# Patient Record
Sex: FEMALE | Race: BLACK OR AFRICAN AMERICAN | Employment: OTHER | URBAN - METROPOLITAN AREA
[De-identification: names, ages, dates, MRNs, and addresses within clinical notes are randomized per-mention and may not be internally consistent; named-entity substitution may affect disease eponyms.]

---

## 2018-10-16 ENCOUNTER — HOSPITAL ENCOUNTER (EMERGENCY)
Age: 28
Discharge: HOME | End: 2018-10-16
Attending: EMERGENCY MEDICINE
Payer: MEDICAID

## 2018-10-16 VITALS
TEMPERATURE: 97.8 F | HEART RATE: 98 BPM | HEIGHT: 70 IN | SYSTOLIC BLOOD PRESSURE: 101 MMHG | OXYGEN SATURATION: 100 % | DIASTOLIC BLOOD PRESSURE: 66 MMHG | BODY MASS INDEX: 13.6 KG/M2 | WEIGHT: 95 LBS | RESPIRATION RATE: 18 BRPM

## 2018-10-16 DIAGNOSIS — G35 MULTIPLE SCLEROSIS (HCC): Primary | ICD-10-CM

## 2018-10-16 LAB
ABSOLUTE EOS #: 0.4 K/UL (ref 0–0.4)
ABSOLUTE IMMATURE GRANULOCYTE: ABNORMAL K/UL (ref 0–0.3)
ABSOLUTE LYMPH #: 1.7 K/UL (ref 1–4.8)
ABSOLUTE MONO #: 0.5 K/UL (ref 0.2–0.8)
ANION GAP SERPL CALCULATED.3IONS-SCNC: 12 MMOL/L (ref 9–17)
BASOPHILS # BLD: 2 % (ref 0–2)
BASOPHILS ABSOLUTE: 0.1 K/UL (ref 0–0.2)
BUN BLDV-MCNC: 15 MG/DL (ref 6–20)
BUN/CREAT BLD: 22 (ref 9–20)
CALCIUM SERPL-MCNC: 9.8 MG/DL (ref 8.6–10.4)
CHLORIDE BLD-SCNC: 105 MMOL/L (ref 98–107)
CO2: 21 MMOL/L (ref 20–31)
CREAT SERPL-MCNC: 0.67 MG/DL (ref 0.5–0.9)
DIFFERENTIAL TYPE: ABNORMAL
EOSINOPHILS RELATIVE PERCENT: 6 % (ref 1–4)
GFR AFRICAN AMERICAN: >60 ML/MIN
GFR NON-AFRICAN AMERICAN: >60 ML/MIN
GFR SERPL CREATININE-BSD FRML MDRD: ABNORMAL ML/MIN/{1.73_M2}
GFR SERPL CREATININE-BSD FRML MDRD: ABNORMAL ML/MIN/{1.73_M2}
GLUCOSE BLD-MCNC: 132 MG/DL (ref 70–99)
HCT VFR BLD CALC: 42.8 % (ref 36–46)
HEMOGLOBIN: 14.3 G/DL (ref 12–16)
IMMATURE GRANULOCYTES: ABNORMAL %
LYMPHOCYTES # BLD: 26 % (ref 24–44)
MAGNESIUM: 2 MG/DL (ref 1.6–2.6)
MCH RBC QN AUTO: 29.9 PG (ref 26–34)
MCHC RBC AUTO-ENTMCNC: 33.5 G/DL (ref 31–37)
MCV RBC AUTO: 89 FL (ref 80–100)
MONOCYTES # BLD: 8 % (ref 1–7)
NRBC AUTOMATED: ABNORMAL PER 100 WBC
PDW BLD-RTO: 14.7 % (ref 11.5–14.5)
PLATELET # BLD: 233 K/UL (ref 130–400)
PLATELET ESTIMATE: ABNORMAL
PMV BLD AUTO: 9.1 FL (ref 6–12)
POTASSIUM SERPL-SCNC: 4.4 MMOL/L (ref 3.7–5.3)
RBC # BLD: 4.8 M/UL (ref 4–5.2)
RBC # BLD: ABNORMAL 10*6/UL
SEG NEUTROPHILS: 58 % (ref 36–66)
SEGMENTED NEUTROPHILS ABSOLUTE COUNT: 3.9 K/UL (ref 1.8–7.7)
SODIUM BLD-SCNC: 138 MMOL/L (ref 135–144)
WBC # BLD: 6.7 K/UL (ref 3.5–11)
WBC # BLD: ABNORMAL 10*3/UL

## 2018-10-16 PROCEDURE — 6360000002 HC RX W HCPCS: Performed by: EMERGENCY MEDICINE

## 2018-10-16 PROCEDURE — 36415 COLL VENOUS BLD VENIPUNCTURE: CPT

## 2018-10-16 PROCEDURE — 99283 EMERGENCY DEPT VISIT LOW MDM: CPT

## 2018-10-16 PROCEDURE — 2580000003 HC RX 258: Performed by: EMERGENCY MEDICINE

## 2018-10-16 PROCEDURE — 85025 COMPLETE CBC W/AUTO DIFF WBC: CPT

## 2018-10-16 PROCEDURE — 96375 TX/PRO/DX INJ NEW DRUG ADDON: CPT

## 2018-10-16 PROCEDURE — 83735 ASSAY OF MAGNESIUM: CPT

## 2018-10-16 PROCEDURE — 80048 BASIC METABOLIC PNL TOTAL CA: CPT

## 2018-10-16 PROCEDURE — 96365 THER/PROPH/DIAG IV INF INIT: CPT

## 2018-10-16 RX ORDER — PREDNISONE 20 MG/1
20 TABLET ORAL 2 TIMES DAILY
Qty: 10 TABLET | Refills: 0 | Status: SHIPPED | OUTPATIENT
Start: 2018-10-16 | End: 2018-10-21

## 2018-10-16 RX ORDER — 0.9 % SODIUM CHLORIDE 0.9 %
500 INTRAVENOUS SOLUTION INTRAVENOUS ONCE
Status: COMPLETED | OUTPATIENT
Start: 2018-10-16 | End: 2018-10-16

## 2018-10-16 RX ORDER — OXYCODONE HYDROCHLORIDE AND ACETAMINOPHEN 5; 325 MG/1; MG/1
1-2 TABLET ORAL EVERY 6 HOURS PRN
Qty: 20 TABLET | Refills: 0 | Status: SHIPPED | OUTPATIENT
Start: 2018-10-16 | End: 2018-10-19

## 2018-10-16 RX ORDER — ONDANSETRON 2 MG/ML
4 INJECTION INTRAMUSCULAR; INTRAVENOUS ONCE
Status: COMPLETED | OUTPATIENT
Start: 2018-10-16 | End: 2018-10-16

## 2018-10-16 RX ORDER — FENTANYL CITRATE 50 UG/ML
50 INJECTION, SOLUTION INTRAMUSCULAR; INTRAVENOUS ONCE
Status: COMPLETED | OUTPATIENT
Start: 2018-10-16 | End: 2018-10-16

## 2018-10-16 RX ADMIN — SODIUM CHLORIDE 1000 MG: 9 INJECTION, SOLUTION INTRAVENOUS at 21:20

## 2018-10-16 RX ADMIN — SODIUM CHLORIDE 500 ML: 9 INJECTION, SOLUTION INTRAVENOUS at 20:58

## 2018-10-16 RX ADMIN — FENTANYL CITRATE 50 MCG: 50 INJECTION, SOLUTION INTRAMUSCULAR; INTRAVENOUS at 20:59

## 2018-10-16 RX ADMIN — ONDANSETRON HYDROCHLORIDE 4 MG: 2 INJECTION, SOLUTION INTRAMUSCULAR; INTRAVENOUS at 20:59

## 2018-10-16 ASSESSMENT — PAIN SCALES - GENERAL
PAINLEVEL_OUTOF10: 8
PAINLEVEL_OUTOF10: 8

## 2018-10-16 ASSESSMENT — ENCOUNTER SYMPTOMS
RESPIRATORY NEGATIVE: 1
GASTROINTESTINAL NEGATIVE: 1

## 2018-10-16 ASSESSMENT — PAIN DESCRIPTION - PAIN TYPE: TYPE: CHRONIC PAIN

## 2018-10-16 ASSESSMENT — PAIN DESCRIPTION - LOCATION: LOCATION: GENERALIZED

## 2019-11-04 ENCOUNTER — HOSPITAL ENCOUNTER (INPATIENT)
Age: 29
LOS: 3 days | Discharge: NURSING, REHAB OR LONG-TERM CARE | DRG: 043 | End: 2019-11-08
Attending: EMERGENCY MEDICINE | Admitting: INTERNAL MEDICINE
Payer: MEDICAID

## 2019-11-04 ENCOUNTER — APPOINTMENT (OUTPATIENT)
Dept: CT IMAGING | Age: 29
DRG: 043 | End: 2019-11-04
Payer: MEDICAID

## 2019-11-04 DIAGNOSIS — G35 EXACERBATION OF MULTIPLE SCLEROSIS (HCC): Primary | ICD-10-CM

## 2019-11-04 PROCEDURE — 96365 THER/PROPH/DIAG IV INF INIT: CPT

## 2019-11-04 PROCEDURE — 70450 CT HEAD/BRAIN W/O DYE: CPT

## 2019-11-04 PROCEDURE — 80048 BASIC METABOLIC PNL TOTAL CA: CPT

## 2019-11-04 PROCEDURE — 6360000002 HC RX W HCPCS: Performed by: PHYSICIAN ASSISTANT

## 2019-11-04 PROCEDURE — 2580000003 HC RX 258: Performed by: PHYSICIAN ASSISTANT

## 2019-11-04 PROCEDURE — 85025 COMPLETE CBC W/AUTO DIFF WBC: CPT

## 2019-11-04 PROCEDURE — 99285 EMERGENCY DEPT VISIT HI MDM: CPT

## 2019-11-04 PROCEDURE — 83874 ASSAY OF MYOGLOBIN: CPT

## 2019-11-04 PROCEDURE — 84484 ASSAY OF TROPONIN QUANT: CPT

## 2019-11-04 PROCEDURE — 36415 COLL VENOUS BLD VENIPUNCTURE: CPT

## 2019-11-04 RX ORDER — 0.9 % SODIUM CHLORIDE 0.9 %
1000 INTRAVENOUS SOLUTION INTRAVENOUS ONCE
Status: COMPLETED | OUTPATIENT
Start: 2019-11-04 | End: 2019-11-05

## 2019-11-04 RX ADMIN — SODIUM CHLORIDE 1000 MG: 9 INJECTION, SOLUTION INTRAVENOUS at 23:56

## 2019-11-04 ASSESSMENT — PAIN DESCRIPTION - PROGRESSION: CLINICAL_PROGRESSION: NOT CHANGED

## 2019-11-04 ASSESSMENT — PAIN DESCRIPTION - FREQUENCY: FREQUENCY: CONTINUOUS

## 2019-11-04 ASSESSMENT — PAIN - FUNCTIONAL ASSESSMENT: PAIN_FUNCTIONAL_ASSESSMENT: PREVENTS OR INTERFERES SOME ACTIVE ACTIVITIES AND ADLS

## 2019-11-04 ASSESSMENT — PAIN DESCRIPTION - ORIENTATION: ORIENTATION: RIGHT

## 2019-11-04 ASSESSMENT — PAIN DESCRIPTION - ONSET: ONSET: ON-GOING

## 2019-11-04 ASSESSMENT — PAIN SCALES - GENERAL: PAINLEVEL_OUTOF10: 6

## 2019-11-04 ASSESSMENT — PAIN DESCRIPTION - DESCRIPTORS: DESCRIPTORS: ACHING;PRESSURE

## 2019-11-05 ENCOUNTER — APPOINTMENT (OUTPATIENT)
Dept: MRI IMAGING | Age: 29
DRG: 043 | End: 2019-11-05
Payer: MEDICAID

## 2019-11-05 PROBLEM — F12.90 MARIJUANA SMOKER, CONTINUOUS: Status: ACTIVE | Noted: 2019-11-05

## 2019-11-05 PROBLEM — G35 EXACERBATION OF MULTIPLE SCLEROSIS (HCC): Status: ACTIVE | Noted: 2019-11-05

## 2019-11-05 PROBLEM — R21 RASH IN ADULT: Status: ACTIVE | Noted: 2019-11-05

## 2019-11-05 LAB
ABSOLUTE EOS #: 0.39 K/UL (ref 0–0.44)
ABSOLUTE IMMATURE GRANULOCYTE: 0.03 K/UL (ref 0–0.3)
ABSOLUTE LYMPH #: 1.95 K/UL (ref 1.1–3.7)
ABSOLUTE MONO #: 0.82 K/UL (ref 0.1–1.2)
ALBUMIN SERPL-MCNC: 3.8 G/DL (ref 3.5–5.2)
ANION GAP SERPL CALCULATED.3IONS-SCNC: 9 MMOL/L (ref 9–17)
BASOPHILS # BLD: 1 % (ref 0–2)
BASOPHILS ABSOLUTE: 0.05 K/UL (ref 0–0.2)
BUN BLDV-MCNC: 14 MG/DL (ref 6–20)
BUN/CREAT BLD: 24 (ref 9–20)
CALCIUM SERPL-MCNC: 9 MG/DL (ref 8.6–10.4)
CHLORIDE BLD-SCNC: 105 MMOL/L (ref 98–107)
CO2: 26 MMOL/L (ref 20–31)
CREAT SERPL-MCNC: 0.59 MG/DL (ref 0.5–0.9)
DIFFERENTIAL TYPE: ABNORMAL
EKG ATRIAL RATE: 65 BPM
EKG P AXIS: 73 DEGREES
EKG P-R INTERVAL: 106 MS
EKG Q-T INTERVAL: 402 MS
EKG QRS DURATION: 86 MS
EKG QTC CALCULATION (BAZETT): 418 MS
EKG R AXIS: 78 DEGREES
EKG T AXIS: 64 DEGREES
EKG VENTRICULAR RATE: 65 BPM
EOSINOPHILS RELATIVE PERCENT: 6 % (ref 1–4)
GFR AFRICAN AMERICAN: >60 ML/MIN
GFR NON-AFRICAN AMERICAN: >60 ML/MIN
GFR SERPL CREATININE-BSD FRML MDRD: ABNORMAL ML/MIN/{1.73_M2}
GFR SERPL CREATININE-BSD FRML MDRD: ABNORMAL ML/MIN/{1.73_M2}
GLUCOSE BLD-MCNC: 95 MG/DL (ref 70–99)
HCT VFR BLD CALC: 39 % (ref 36.3–47.1)
HEMOGLOBIN: 12.4 G/DL (ref 11.9–15.1)
IMMATURE GRANULOCYTES: 0 %
LYMPHOCYTES # BLD: 28 % (ref 24–43)
MCH RBC QN AUTO: 29.2 PG (ref 25.2–33.5)
MCHC RBC AUTO-ENTMCNC: 31.8 G/DL (ref 28.4–34.8)
MCV RBC AUTO: 92 FL (ref 82.6–102.9)
MONOCYTES # BLD: 12 % (ref 3–12)
MYOGLOBIN: <21 NG/ML (ref 25–58)
NRBC AUTOMATED: 0 PER 100 WBC
PDW BLD-RTO: 13.8 % (ref 11.8–14.4)
PLATELET # BLD: 228 K/UL (ref 138–453)
PLATELET ESTIMATE: ABNORMAL
PMV BLD AUTO: 10.2 FL (ref 8.1–13.5)
POTASSIUM SERPL-SCNC: 3.8 MMOL/L (ref 3.7–5.3)
RBC # BLD: 4.24 M/UL (ref 3.95–5.11)
RBC # BLD: ABNORMAL 10*6/UL
SEG NEUTROPHILS: 53 % (ref 36–65)
SEGMENTED NEUTROPHILS ABSOLUTE COUNT: 3.66 K/UL (ref 1.5–8.1)
SODIUM BLD-SCNC: 140 MMOL/L (ref 135–144)
THYROXINE, FREE: 0.76 NG/DL (ref 0.93–1.7)
TROPONIN INTERP: ABNORMAL
TROPONIN T: ABNORMAL NG/ML
TROPONIN, HIGH SENSITIVITY: <6 NG/L (ref 0–14)
TSH SERPL DL<=0.05 MIU/L-ACNC: 0.2 MIU/L (ref 0.3–5)
VITAMIN D 25-HYDROXY: 7.9 NG/ML (ref 30–100)
WBC # BLD: 6.9 K/UL (ref 3.5–11.3)
WBC # BLD: ABNORMAL 10*3/UL

## 2019-11-05 PROCEDURE — 84439 ASSAY OF FREE THYROXINE: CPT

## 2019-11-05 PROCEDURE — A9579 GAD-BASE MR CONTRAST NOS,1ML: HCPCS | Performed by: PSYCHIATRY & NEUROLOGY

## 2019-11-05 PROCEDURE — 70553 MRI BRAIN STEM W/O & W/DYE: CPT

## 2019-11-05 PROCEDURE — 82040 ASSAY OF SERUM ALBUMIN: CPT

## 2019-11-05 PROCEDURE — 84443 ASSAY THYROID STIM HORMONE: CPT

## 2019-11-05 PROCEDURE — 99255 IP/OBS CONSLTJ NEW/EST HI 80: CPT | Performed by: PSYCHIATRY & NEUROLOGY

## 2019-11-05 PROCEDURE — APPSS45 APP SPLIT SHARED TIME 31-45 MINUTES: Performed by: NURSE PRACTITIONER

## 2019-11-05 PROCEDURE — 84484 ASSAY OF TROPONIN QUANT: CPT

## 2019-11-05 PROCEDURE — 83874 ASSAY OF MYOGLOBIN: CPT

## 2019-11-05 PROCEDURE — 6360000002 HC RX W HCPCS: Performed by: PSYCHIATRY & NEUROLOGY

## 2019-11-05 PROCEDURE — 82746 ASSAY OF FOLIC ACID SERUM: CPT

## 2019-11-05 PROCEDURE — 36415 COLL VENOUS BLD VENIPUNCTURE: CPT

## 2019-11-05 PROCEDURE — 82607 VITAMIN B-12: CPT

## 2019-11-05 PROCEDURE — 2580000003 HC RX 258: Performed by: NURSE PRACTITIONER

## 2019-11-05 PROCEDURE — 6360000004 HC RX CONTRAST MEDICATION: Performed by: PSYCHIATRY & NEUROLOGY

## 2019-11-05 PROCEDURE — 82306 VITAMIN D 25 HYDROXY: CPT

## 2019-11-05 PROCEDURE — 99223 1ST HOSP IP/OBS HIGH 75: CPT | Performed by: INTERNAL MEDICINE

## 2019-11-05 PROCEDURE — 1200000000 HC SEMI PRIVATE

## 2019-11-05 PROCEDURE — 93005 ELECTROCARDIOGRAM TRACING: CPT | Performed by: PHYSICIAN ASSISTANT

## 2019-11-05 PROCEDURE — 2580000003 HC RX 258: Performed by: PSYCHIATRY & NEUROLOGY

## 2019-11-05 PROCEDURE — 2580000003 HC RX 258: Performed by: PHYSICIAN ASSISTANT

## 2019-11-05 PROCEDURE — 6360000002 HC RX W HCPCS: Performed by: NURSE PRACTITIONER

## 2019-11-05 RX ORDER — SODIUM CHLORIDE 9 MG/ML
INJECTION, SOLUTION INTRAVENOUS CONTINUOUS
Status: DISCONTINUED | OUTPATIENT
Start: 2019-11-05 | End: 2019-11-08 | Stop reason: HOSPADM

## 2019-11-05 RX ORDER — NICOTINE 21 MG/24HR
1 PATCH, TRANSDERMAL 24 HOURS TRANSDERMAL DAILY PRN
Status: DISCONTINUED | OUTPATIENT
Start: 2019-11-05 | End: 2019-11-08 | Stop reason: HOSPADM

## 2019-11-05 RX ORDER — SODIUM CHLORIDE 0.9 % (FLUSH) 0.9 %
10 SYRINGE (ML) INJECTION
Status: COMPLETED | OUTPATIENT
Start: 2019-11-05 | End: 2019-11-05

## 2019-11-05 RX ORDER — SODIUM CHLORIDE 0.9 % (FLUSH) 0.9 %
10 SYRINGE (ML) INJECTION EVERY 12 HOURS SCHEDULED
Status: DISCONTINUED | OUTPATIENT
Start: 2019-11-05 | End: 2019-11-08 | Stop reason: HOSPADM

## 2019-11-05 RX ORDER — ONDANSETRON 2 MG/ML
4 INJECTION INTRAMUSCULAR; INTRAVENOUS EVERY 6 HOURS PRN
Status: DISCONTINUED | OUTPATIENT
Start: 2019-11-05 | End: 2019-11-05 | Stop reason: SDUPTHER

## 2019-11-05 RX ORDER — ONDANSETRON 2 MG/ML
4 INJECTION INTRAMUSCULAR; INTRAVENOUS EVERY 6 HOURS PRN
Status: DISCONTINUED | OUTPATIENT
Start: 2019-11-05 | End: 2019-11-08 | Stop reason: HOSPADM

## 2019-11-05 RX ORDER — POTASSIUM CHLORIDE 7.45 MG/ML
10 INJECTION INTRAVENOUS PRN
Status: DISCONTINUED | OUTPATIENT
Start: 2019-11-05 | End: 2019-11-08 | Stop reason: HOSPADM

## 2019-11-05 RX ORDER — ONDANSETRON 4 MG/1
4 TABLET, ORALLY DISINTEGRATING ORAL EVERY 6 HOURS PRN
Status: DISCONTINUED | OUTPATIENT
Start: 2019-11-05 | End: 2019-11-08 | Stop reason: HOSPADM

## 2019-11-05 RX ORDER — POTASSIUM CHLORIDE 20 MEQ/1
40 TABLET, EXTENDED RELEASE ORAL PRN
Status: DISCONTINUED | OUTPATIENT
Start: 2019-11-05 | End: 2019-11-08 | Stop reason: HOSPADM

## 2019-11-05 RX ORDER — ACETAMINOPHEN 325 MG/1
650 TABLET ORAL EVERY 4 HOURS PRN
Status: DISCONTINUED | OUTPATIENT
Start: 2019-11-05 | End: 2019-11-08 | Stop reason: HOSPADM

## 2019-11-05 RX ORDER — MAGNESIUM SULFATE 1 G/100ML
1 INJECTION INTRAVENOUS PRN
Status: DISCONTINUED | OUTPATIENT
Start: 2019-11-05 | End: 2019-11-08 | Stop reason: HOSPADM

## 2019-11-05 RX ORDER — SODIUM CHLORIDE 0.9 % (FLUSH) 0.9 %
10 SYRINGE (ML) INJECTION PRN
Status: DISCONTINUED | OUTPATIENT
Start: 2019-11-05 | End: 2019-11-08 | Stop reason: HOSPADM

## 2019-11-05 RX ADMIN — Medication 10 ML: at 16:03

## 2019-11-05 RX ADMIN — SODIUM CHLORIDE 500 MG: 9 INJECTION, SOLUTION INTRAVENOUS at 15:18

## 2019-11-05 RX ADMIN — SODIUM CHLORIDE: 9 INJECTION, SOLUTION INTRAVENOUS at 02:44

## 2019-11-05 RX ADMIN — ENOXAPARIN SODIUM 40 MG: 100 INJECTION SUBCUTANEOUS at 09:01

## 2019-11-05 RX ADMIN — SODIUM CHLORIDE 1000 ML: 9 INJECTION, SOLUTION INTRAVENOUS at 00:03

## 2019-11-05 RX ADMIN — GADOTERIDOL 10 ML: 279.3 INJECTION, SOLUTION INTRAVENOUS at 16:02

## 2019-11-05 ASSESSMENT — PAIN SCALES - GENERAL: PAINLEVEL_OUTOF10: 6

## 2019-11-06 ENCOUNTER — APPOINTMENT (OUTPATIENT)
Dept: MRI IMAGING | Age: 29
DRG: 043 | End: 2019-11-06
Payer: MEDICAID

## 2019-11-06 LAB
ABSOLUTE EOS #: <0.03 K/UL (ref 0–0.44)
ABSOLUTE IMMATURE GRANULOCYTE: 0.19 K/UL (ref 0–0.3)
ABSOLUTE LYMPH #: 0.84 K/UL (ref 1.1–3.7)
ABSOLUTE MONO #: 0.57 K/UL (ref 0.1–1.2)
ANION GAP SERPL CALCULATED.3IONS-SCNC: 13 MMOL/L (ref 9–17)
BASOPHILS # BLD: 0 % (ref 0–2)
BASOPHILS ABSOLUTE: 0.04 K/UL (ref 0–0.2)
BUN BLDV-MCNC: 10 MG/DL (ref 6–20)
BUN/CREAT BLD: 23 (ref 9–20)
CALCIUM SERPL-MCNC: 9.2 MG/DL (ref 8.6–10.4)
CHLORIDE BLD-SCNC: 104 MMOL/L (ref 98–107)
CO2: 20 MMOL/L (ref 20–31)
CREAT SERPL-MCNC: 0.43 MG/DL (ref 0.5–0.9)
DIFFERENTIAL TYPE: ABNORMAL
EOSINOPHILS RELATIVE PERCENT: 0 % (ref 1–4)
FOLATE: 9.6 NG/ML
GFR AFRICAN AMERICAN: >60 ML/MIN
GFR NON-AFRICAN AMERICAN: >60 ML/MIN
GFR SERPL CREATININE-BSD FRML MDRD: ABNORMAL ML/MIN/{1.73_M2}
GFR SERPL CREATININE-BSD FRML MDRD: ABNORMAL ML/MIN/{1.73_M2}
GLUCOSE BLD-MCNC: 140 MG/DL (ref 70–99)
HCT VFR BLD CALC: 34.9 % (ref 36.3–47.1)
HEMOGLOBIN: 11.4 G/DL (ref 11.9–15.1)
IMMATURE GRANULOCYTES: 1 %
INR BLD: 1.1
LYMPHOCYTES # BLD: 5 % (ref 24–43)
MCH RBC QN AUTO: 29.3 PG (ref 25.2–33.5)
MCHC RBC AUTO-ENTMCNC: 32.7 G/DL (ref 28.4–34.8)
MCV RBC AUTO: 89.7 FL (ref 82.6–102.9)
MONOCYTES # BLD: 3 % (ref 3–12)
NRBC AUTOMATED: 0 PER 100 WBC
PDW BLD-RTO: 13.8 % (ref 11.8–14.4)
PLATELET # BLD: 219 K/UL (ref 138–453)
PLATELET ESTIMATE: ABNORMAL
PMV BLD AUTO: 10.3 FL (ref 8.1–13.5)
POTASSIUM SERPL-SCNC: 3.6 MMOL/L (ref 3.7–5.3)
PROTHROMBIN TIME: 11.3 SEC (ref 9.7–11.6)
RBC # BLD: 3.89 M/UL (ref 3.95–5.11)
RBC # BLD: ABNORMAL 10*6/UL
SEG NEUTROPHILS: 91 % (ref 36–65)
SEGMENTED NEUTROPHILS ABSOLUTE COUNT: 17.16 K/UL (ref 1.5–8.1)
SODIUM BLD-SCNC: 137 MMOL/L (ref 135–144)
VITAMIN B-12: 461 PG/ML (ref 232–1245)
WBC # BLD: 18.8 K/UL (ref 3.5–11.3)
WBC # BLD: ABNORMAL 10*3/UL

## 2019-11-06 PROCEDURE — 1200000000 HC SEMI PRIVATE

## 2019-11-06 PROCEDURE — 85025 COMPLETE CBC W/AUTO DIFF WBC: CPT

## 2019-11-06 PROCEDURE — 6360000002 HC RX W HCPCS: Performed by: PSYCHIATRY & NEUROLOGY

## 2019-11-06 PROCEDURE — 97110 THERAPEUTIC EXERCISES: CPT

## 2019-11-06 PROCEDURE — 97535 SELF CARE MNGMENT TRAINING: CPT

## 2019-11-06 PROCEDURE — 97112 NEUROMUSCULAR REEDUCATION: CPT

## 2019-11-06 PROCEDURE — 2580000003 HC RX 258: Performed by: PSYCHIATRY & NEUROLOGY

## 2019-11-06 PROCEDURE — 6370000000 HC RX 637 (ALT 250 FOR IP): Performed by: PSYCHIATRY & NEUROLOGY

## 2019-11-06 PROCEDURE — A9579 GAD-BASE MR CONTRAST NOS,1ML: HCPCS | Performed by: PSYCHIATRY & NEUROLOGY

## 2019-11-06 PROCEDURE — 80048 BASIC METABOLIC PNL TOTAL CA: CPT

## 2019-11-06 PROCEDURE — 99232 SBSQ HOSP IP/OBS MODERATE 35: CPT | Performed by: PSYCHIATRY & NEUROLOGY

## 2019-11-06 PROCEDURE — 99232 SBSQ HOSP IP/OBS MODERATE 35: CPT | Performed by: INTERNAL MEDICINE

## 2019-11-06 PROCEDURE — 97167 OT EVAL HIGH COMPLEX 60 MIN: CPT

## 2019-11-06 PROCEDURE — 6360000004 HC RX CONTRAST MEDICATION: Performed by: PSYCHIATRY & NEUROLOGY

## 2019-11-06 PROCEDURE — 2580000003 HC RX 258: Performed by: NURSE PRACTITIONER

## 2019-11-06 PROCEDURE — 6360000002 HC RX W HCPCS: Performed by: NURSE PRACTITIONER

## 2019-11-06 PROCEDURE — 97163 PT EVAL HIGH COMPLEX 45 MIN: CPT

## 2019-11-06 PROCEDURE — 97530 THERAPEUTIC ACTIVITIES: CPT

## 2019-11-06 PROCEDURE — 85610 PROTHROMBIN TIME: CPT

## 2019-11-06 PROCEDURE — 36415 COLL VENOUS BLD VENIPUNCTURE: CPT

## 2019-11-06 PROCEDURE — 72156 MRI NECK SPINE W/O & W/DYE: CPT

## 2019-11-06 RX ORDER — SODIUM CHLORIDE 0.9 % (FLUSH) 0.9 %
10 SYRINGE (ML) INJECTION
Status: COMPLETED | OUTPATIENT
Start: 2019-11-06 | End: 2019-11-06

## 2019-11-06 RX ADMIN — CHOLECALCIFEROL CAP 125 MCG (5000 UNIT) 50000 UNITS: 125 CAP at 08:57

## 2019-11-06 RX ADMIN — ENOXAPARIN SODIUM 40 MG: 100 INJECTION SUBCUTANEOUS at 08:58

## 2019-11-06 RX ADMIN — SODIUM CHLORIDE 500 MG: 9 INJECTION, SOLUTION INTRAVENOUS at 15:26

## 2019-11-06 RX ADMIN — Medication 10 ML: at 02:55

## 2019-11-06 RX ADMIN — SODIUM CHLORIDE 500 MG: 9 INJECTION, SOLUTION INTRAVENOUS at 02:54

## 2019-11-06 RX ADMIN — SODIUM CHLORIDE: 9 INJECTION, SOLUTION INTRAVENOUS at 09:22

## 2019-11-06 RX ADMIN — GADOTERIDOL 10 ML: 279.3 INJECTION, SOLUTION INTRAVENOUS at 13:40

## 2019-11-06 RX ADMIN — Medication 10 ML: at 13:40

## 2019-11-06 ASSESSMENT — PAIN DESCRIPTION - LOCATION: LOCATION: ARM;HIP;BACK

## 2019-11-06 ASSESSMENT — PAIN SCALES - GENERAL: PAINLEVEL_OUTOF10: 6

## 2019-11-06 ASSESSMENT — PAIN DESCRIPTION - ORIENTATION: ORIENTATION: RIGHT

## 2019-11-07 PROBLEM — E03.8 ACQUIRED CENTRAL HYPOTHYROIDISM: Status: ACTIVE | Noted: 2019-11-07

## 2019-11-07 LAB
ABSOLUTE EOS #: 0 K/UL (ref 0–0.4)
ABSOLUTE IMMATURE GRANULOCYTE: 0.15 K/UL (ref 0–0.3)
ABSOLUTE LYMPH #: 0.45 K/UL (ref 1–4.8)
ABSOLUTE MONO #: 0.6 K/UL (ref 0.2–0.8)
ANION GAP SERPL CALCULATED.3IONS-SCNC: 10 MMOL/L (ref 9–17)
BASOPHILS # BLD: 0 %
BASOPHILS ABSOLUTE: 0 K/UL (ref 0–0.2)
BUN BLDV-MCNC: 12 MG/DL (ref 6–20)
BUN/CREAT BLD: 25 (ref 9–20)
CALCIUM SERPL-MCNC: 9 MG/DL (ref 8.6–10.4)
CHLORIDE BLD-SCNC: 108 MMOL/L (ref 98–107)
CO2: 22 MMOL/L (ref 20–31)
CREAT SERPL-MCNC: 0.48 MG/DL (ref 0.5–0.9)
DIFFERENTIAL TYPE: ABNORMAL
EOSINOPHILS RELATIVE PERCENT: 0 % (ref 1–4)
GFR AFRICAN AMERICAN: >60 ML/MIN
GFR NON-AFRICAN AMERICAN: >60 ML/MIN
GFR SERPL CREATININE-BSD FRML MDRD: ABNORMAL ML/MIN/{1.73_M2}
GFR SERPL CREATININE-BSD FRML MDRD: ABNORMAL ML/MIN/{1.73_M2}
GLUCOSE BLD-MCNC: 139 MG/DL (ref 70–99)
HCT VFR BLD CALC: 32.7 % (ref 36.3–47.1)
HEMOGLOBIN: 10.6 G/DL (ref 11.9–15.1)
IMMATURE GRANULOCYTES: 1 %
LYMPHOCYTES # BLD: 3 % (ref 24–44)
MCH RBC QN AUTO: 29 PG (ref 25.2–33.5)
MCHC RBC AUTO-ENTMCNC: 32.4 G/DL (ref 28.4–34.8)
MCV RBC AUTO: 89.3 FL (ref 82.6–102.9)
MONOCYTES # BLD: 4 % (ref 1–7)
NRBC AUTOMATED: 0 PER 100 WBC
PDW BLD-RTO: 14.1 % (ref 11.8–14.4)
PLATELET # BLD: 218 K/UL (ref 138–453)
PLATELET ESTIMATE: ABNORMAL
PMV BLD AUTO: 10.4 FL (ref 8.1–13.5)
POTASSIUM SERPL-SCNC: 4.1 MMOL/L (ref 3.7–5.3)
RBC # BLD: 3.66 M/UL (ref 3.95–5.11)
RBC # BLD: ABNORMAL 10*6/UL
SEG NEUTROPHILS: 92 % (ref 36–66)
SEGMENTED NEUTROPHILS ABSOLUTE COUNT: 13.8 K/UL (ref 1.8–7.7)
SODIUM BLD-SCNC: 140 MMOL/L (ref 135–144)
T3 TOTAL: 37 NG/DL (ref 80–200)
T4 TOTAL: 3.2 UG/DL (ref 4.5–12)
WBC # BLD: 15 K/UL (ref 3.5–11.3)
WBC # BLD: ABNORMAL 10*3/UL

## 2019-11-07 PROCEDURE — 6370000000 HC RX 637 (ALT 250 FOR IP): Performed by: INTERNAL MEDICINE

## 2019-11-07 PROCEDURE — 6370000000 HC RX 637 (ALT 250 FOR IP): Performed by: NURSE PRACTITIONER

## 2019-11-07 PROCEDURE — 99232 SBSQ HOSP IP/OBS MODERATE 35: CPT | Performed by: INTERNAL MEDICINE

## 2019-11-07 PROCEDURE — 2580000003 HC RX 258: Performed by: NURSE PRACTITIONER

## 2019-11-07 PROCEDURE — 6360000002 HC RX W HCPCS: Performed by: PSYCHIATRY & NEUROLOGY

## 2019-11-07 PROCEDURE — 85025 COMPLETE CBC W/AUTO DIFF WBC: CPT

## 2019-11-07 PROCEDURE — 6360000002 HC RX W HCPCS: Performed by: NURSE PRACTITIONER

## 2019-11-07 PROCEDURE — 84480 ASSAY TRIIODOTHYRONINE (T3): CPT

## 2019-11-07 PROCEDURE — 2580000003 HC RX 258: Performed by: PSYCHIATRY & NEUROLOGY

## 2019-11-07 PROCEDURE — 97110 THERAPEUTIC EXERCISES: CPT

## 2019-11-07 PROCEDURE — 36415 COLL VENOUS BLD VENIPUNCTURE: CPT

## 2019-11-07 PROCEDURE — 97535 SELF CARE MNGMENT TRAINING: CPT | Performed by: NURSE PRACTITIONER

## 2019-11-07 PROCEDURE — 84436 ASSAY OF TOTAL THYROXINE: CPT

## 2019-11-07 PROCEDURE — 99233 SBSQ HOSP IP/OBS HIGH 50: CPT | Performed by: PSYCHIATRY & NEUROLOGY

## 2019-11-07 PROCEDURE — 80048 BASIC METABOLIC PNL TOTAL CA: CPT

## 2019-11-07 PROCEDURE — 97530 THERAPEUTIC ACTIVITIES: CPT

## 2019-11-07 PROCEDURE — 1200000000 HC SEMI PRIVATE

## 2019-11-07 RX ORDER — HYDROCODONE BITARTRATE AND ACETAMINOPHEN 5; 325 MG/1; MG/1
1 TABLET ORAL ONCE
Status: COMPLETED | OUTPATIENT
Start: 2019-11-07 | End: 2019-11-07

## 2019-11-07 RX ORDER — LEVOTHYROXINE SODIUM 0.05 MG/1
50 TABLET ORAL DAILY
Status: DISCONTINUED | OUTPATIENT
Start: 2019-11-07 | End: 2019-11-08 | Stop reason: HOSPADM

## 2019-11-07 RX ADMIN — ENOXAPARIN SODIUM 40 MG: 100 INJECTION SUBCUTANEOUS at 08:56

## 2019-11-07 RX ADMIN — SODIUM CHLORIDE 500 MG: 9 INJECTION, SOLUTION INTRAVENOUS at 14:37

## 2019-11-07 RX ADMIN — SODIUM CHLORIDE: 9 INJECTION, SOLUTION INTRAVENOUS at 01:04

## 2019-11-07 RX ADMIN — LEVOTHYROXINE SODIUM 50 MCG: 50 TABLET ORAL at 13:58

## 2019-11-07 RX ADMIN — HYDROCODONE BITARTRATE AND ACETAMINOPHEN 1 TABLET: 5; 325 TABLET ORAL at 01:09

## 2019-11-07 RX ADMIN — SODIUM CHLORIDE: 9 INJECTION, SOLUTION INTRAVENOUS at 14:37

## 2019-11-07 RX ADMIN — SODIUM CHLORIDE 500 MG: 9 INJECTION, SOLUTION INTRAVENOUS at 03:02

## 2019-11-07 ASSESSMENT — PAIN SCALES - GENERAL: PAINLEVEL_OUTOF10: 6

## 2019-11-08 VITALS
BODY MASS INDEX: 16 KG/M2 | TEMPERATURE: 98.4 F | OXYGEN SATURATION: 99 % | RESPIRATION RATE: 16 BRPM | HEART RATE: 55 BPM | DIASTOLIC BLOOD PRESSURE: 60 MMHG | SYSTOLIC BLOOD PRESSURE: 109 MMHG | HEIGHT: 69 IN | WEIGHT: 108 LBS

## 2019-11-08 LAB
ABSOLUTE EOS #: 0 K/UL (ref 0–0.4)
ABSOLUTE IMMATURE GRANULOCYTE: 0.11 K/UL (ref 0–0.3)
ABSOLUTE LYMPH #: 0.63 K/UL (ref 1–4.8)
ABSOLUTE MONO #: 0.95 K/UL (ref 0.2–0.8)
ANION GAP SERPL CALCULATED.3IONS-SCNC: 8 MMOL/L (ref 9–17)
BASOPHILS # BLD: 0 %
BASOPHILS ABSOLUTE: 0 K/UL (ref 0–0.2)
BUN BLDV-MCNC: 9 MG/DL (ref 6–20)
BUN/CREAT BLD: 18 (ref 9–20)
CALCIUM SERPL-MCNC: 8.7 MG/DL (ref 8.6–10.4)
CHLORIDE BLD-SCNC: 107 MMOL/L (ref 98–107)
CO2: 24 MMOL/L (ref 20–31)
CREAT SERPL-MCNC: 0.49 MG/DL (ref 0.5–0.9)
DIFFERENTIAL TYPE: ABNORMAL
EOSINOPHILS RELATIVE PERCENT: 0 % (ref 1–4)
GFR AFRICAN AMERICAN: >60 ML/MIN
GFR NON-AFRICAN AMERICAN: >60 ML/MIN
GFR SERPL CREATININE-BSD FRML MDRD: ABNORMAL ML/MIN/{1.73_M2}
GFR SERPL CREATININE-BSD FRML MDRD: ABNORMAL ML/MIN/{1.73_M2}
GLUCOSE BLD-MCNC: 123 MG/DL (ref 70–99)
HCT VFR BLD CALC: 32.7 % (ref 36.3–47.1)
HEMOGLOBIN: 10.8 G/DL (ref 11.9–15.1)
IMMATURE GRANULOCYTES: 1 %
LYMPHOCYTES # BLD: 6 % (ref 24–44)
MCH RBC QN AUTO: 29.6 PG (ref 25.2–33.5)
MCHC RBC AUTO-ENTMCNC: 33 G/DL (ref 28.4–34.8)
MCV RBC AUTO: 89.6 FL (ref 82.6–102.9)
MONOCYTES # BLD: 9 % (ref 1–7)
NRBC AUTOMATED: 0 PER 100 WBC
PDW BLD-RTO: 14.3 % (ref 11.8–14.4)
PLATELET # BLD: 208 K/UL (ref 138–453)
PLATELET ESTIMATE: ABNORMAL
PMV BLD AUTO: 10.5 FL (ref 8.1–13.5)
POTASSIUM SERPL-SCNC: 3.8 MMOL/L (ref 3.7–5.3)
RBC # BLD: 3.65 M/UL (ref 3.95–5.11)
RBC # BLD: ABNORMAL 10*6/UL
SEG NEUTROPHILS: 84 % (ref 36–66)
SEGMENTED NEUTROPHILS ABSOLUTE COUNT: 8.81 K/UL (ref 1.8–7.7)
SODIUM BLD-SCNC: 139 MMOL/L (ref 135–144)
WBC # BLD: 10.5 K/UL (ref 3.5–11.3)
WBC # BLD: ABNORMAL 10*3/UL

## 2019-11-08 PROCEDURE — 85025 COMPLETE CBC W/AUTO DIFF WBC: CPT

## 2019-11-08 PROCEDURE — 36415 COLL VENOUS BLD VENIPUNCTURE: CPT

## 2019-11-08 PROCEDURE — 99239 HOSP IP/OBS DSCHRG MGMT >30: CPT | Performed by: INTERNAL MEDICINE

## 2019-11-08 PROCEDURE — 80048 BASIC METABOLIC PNL TOTAL CA: CPT

## 2019-11-08 PROCEDURE — 2580000003 HC RX 258: Performed by: PSYCHIATRY & NEUROLOGY

## 2019-11-08 PROCEDURE — 99233 SBSQ HOSP IP/OBS HIGH 50: CPT | Performed by: PSYCHIATRY & NEUROLOGY

## 2019-11-08 PROCEDURE — 6370000000 HC RX 637 (ALT 250 FOR IP): Performed by: INTERNAL MEDICINE

## 2019-11-08 PROCEDURE — 6360000002 HC RX W HCPCS: Performed by: NURSE PRACTITIONER

## 2019-11-08 PROCEDURE — 6360000002 HC RX W HCPCS: Performed by: PSYCHIATRY & NEUROLOGY

## 2019-11-08 PROCEDURE — 2580000003 HC RX 258: Performed by: NURSE PRACTITIONER

## 2019-11-08 RX ORDER — MELATONIN
1000 DAILY
Qty: 90 TABLET | Refills: 1 | DISCHARGE
Start: 2019-11-08 | End: 2019-11-08 | Stop reason: SDUPTHER

## 2019-11-08 RX ORDER — MELATONIN
1000 DAILY
Qty: 90 TABLET | Refills: 1 | DISCHARGE
Start: 2019-11-08 | End: 2020-06-16 | Stop reason: SDUPTHER

## 2019-11-08 RX ORDER — ONDANSETRON 4 MG/1
4 TABLET, ORALLY DISINTEGRATING ORAL EVERY 6 HOURS PRN
DISCHARGE
Start: 2019-11-08

## 2019-11-08 RX ORDER — PREDNISONE 10 MG/1
TABLET ORAL
Refills: 0 | DISCHARGE
Start: 2019-11-08 | End: 2020-01-29

## 2019-11-08 RX ORDER — NICOTINE 21 MG/24HR
1 PATCH, TRANSDERMAL 24 HOURS TRANSDERMAL DAILY PRN
Qty: 30 PATCH | Refills: 3 | DISCHARGE
Start: 2019-11-08 | End: 2020-01-29

## 2019-11-08 RX ORDER — LEVOTHYROXINE SODIUM 0.05 MG/1
50 TABLET ORAL DAILY
Qty: 30 TABLET | Refills: 3 | DISCHARGE
Start: 2019-11-09

## 2019-11-08 RX ADMIN — LEVOTHYROXINE SODIUM 50 MCG: 50 TABLET ORAL at 06:25

## 2019-11-08 RX ADMIN — SODIUM CHLORIDE, PRESERVATIVE FREE 10 ML: 5 INJECTION INTRAVENOUS at 02:04

## 2019-11-08 RX ADMIN — ENOXAPARIN SODIUM 40 MG: 100 INJECTION SUBCUTANEOUS at 08:17

## 2019-11-08 RX ADMIN — SODIUM CHLORIDE 500 MG: 9 INJECTION, SOLUTION INTRAVENOUS at 07:11

## 2019-11-08 RX ADMIN — SODIUM CHLORIDE: 9 INJECTION, SOLUTION INTRAVENOUS at 04:43

## 2019-11-14 ENCOUNTER — TELEPHONE (OUTPATIENT)
Dept: NEUROLOGY | Age: 29
End: 2019-11-14

## 2019-11-14 ENCOUNTER — OFFICE VISIT (OUTPATIENT)
Dept: NEUROLOGY | Age: 29
End: 2019-11-14
Payer: MEDICAID

## 2019-11-14 ENCOUNTER — TELEPHONE (OUTPATIENT)
Dept: ONCOLOGY | Age: 29
End: 2019-11-14

## 2019-11-14 VITALS
DIASTOLIC BLOOD PRESSURE: 66 MMHG | SYSTOLIC BLOOD PRESSURE: 123 MMHG | HEIGHT: 70 IN | BODY MASS INDEX: 15.75 KG/M2 | HEART RATE: 106 BPM | WEIGHT: 110 LBS

## 2019-11-14 DIAGNOSIS — G82.20 LOWER PARAPLEGIA (HCC): ICD-10-CM

## 2019-11-14 DIAGNOSIS — G35 PRIMARY CHRONIC PROGRESSIVE MULTIPLE SCLEROSIS (HCC): Primary | ICD-10-CM

## 2019-11-14 DIAGNOSIS — Z51.81 ENCOUNTER FOR MEDICATION MONITORING: ICD-10-CM

## 2019-11-14 DIAGNOSIS — E03.8 ACQUIRED CENTRAL HYPOTHYROIDISM: ICD-10-CM

## 2019-11-14 PROCEDURE — 99214 OFFICE O/P EST MOD 30 MIN: CPT | Performed by: PSYCHIATRY & NEUROLOGY

## 2019-11-20 ENCOUNTER — HOSPITAL ENCOUNTER (OUTPATIENT)
Dept: MRI IMAGING | Age: 29
Discharge: HOME | End: 2019-11-22
Payer: MEDICAID

## 2019-11-20 DIAGNOSIS — G35 EXACERBATION OF MULTIPLE SCLEROSIS (HCC): ICD-10-CM

## 2019-11-20 PROCEDURE — 72157 MRI CHEST SPINE W/O & W/DYE: CPT

## 2019-11-20 PROCEDURE — A9579 GAD-BASE MR CONTRAST NOS,1ML: HCPCS | Performed by: INTERNAL MEDICINE

## 2019-11-20 PROCEDURE — 6360000004 HC RX CONTRAST MEDICATION: Performed by: INTERNAL MEDICINE

## 2019-11-20 PROCEDURE — 72158 MRI LUMBAR SPINE W/O & W/DYE: CPT

## 2019-11-20 RX ADMIN — GADOTERIDOL 10 ML: 279.3 INJECTION, SOLUTION INTRAVENOUS at 15:14

## 2019-12-23 ENCOUNTER — OFFICE VISIT (OUTPATIENT)
Dept: NEUROLOGY | Age: 29
End: 2019-12-23
Payer: MEDICAID

## 2019-12-23 VITALS
DIASTOLIC BLOOD PRESSURE: 70 MMHG | BODY MASS INDEX: 14.32 KG/M2 | HEIGHT: 70 IN | WEIGHT: 100 LBS | HEART RATE: 114 BPM | SYSTOLIC BLOOD PRESSURE: 110 MMHG

## 2019-12-23 DIAGNOSIS — G35 PRIMARY CHRONIC PROGRESSIVE MULTIPLE SCLEROSIS (HCC): Primary | ICD-10-CM

## 2019-12-23 DIAGNOSIS — G82.20 LOWER PARAPLEGIA (HCC): ICD-10-CM

## 2019-12-23 PROCEDURE — G8419 CALC BMI OUT NRM PARAM NOF/U: HCPCS | Performed by: NURSE PRACTITIONER

## 2019-12-23 PROCEDURE — 99214 OFFICE O/P EST MOD 30 MIN: CPT | Performed by: NURSE PRACTITIONER

## 2019-12-23 PROCEDURE — G8484 FLU IMMUNIZE NO ADMIN: HCPCS | Performed by: NURSE PRACTITIONER

## 2019-12-23 PROCEDURE — 1036F TOBACCO NON-USER: CPT | Performed by: NURSE PRACTITIONER

## 2019-12-23 PROCEDURE — G8427 DOCREV CUR MEDS BY ELIG CLIN: HCPCS | Performed by: NURSE PRACTITIONER

## 2019-12-23 RX ORDER — BISACODYL 10 MG
10 SUPPOSITORY, RECTAL RECTAL DAILY
COMMUNITY

## 2019-12-23 RX ORDER — SENNOSIDES 8.6 MG
650 CAPSULE ORAL EVERY 8 HOURS PRN
COMMUNITY

## 2019-12-23 RX ORDER — FERROUS SULFATE 325(65) MG
325 TABLET ORAL
COMMUNITY

## 2020-01-28 ENCOUNTER — HOSPITAL ENCOUNTER (OUTPATIENT)
Age: 30
Setting detail: SPECIMEN
Discharge: HOME | End: 2020-01-28
Payer: MEDICAID

## 2020-01-28 ENCOUNTER — TELEPHONE (OUTPATIENT)
Dept: NEUROLOGY | Age: 30
End: 2020-01-28

## 2020-01-28 LAB
ANION GAP SERPL CALCULATED.3IONS-SCNC: 10 MMOL/L (ref 9–17)
BUN BLDV-MCNC: 12 MG/DL (ref 6–20)
BUN/CREAT BLD: 25 (ref 9–20)
CALCIUM SERPL-MCNC: 9.4 MG/DL (ref 8.6–10.4)
CHLORIDE BLD-SCNC: 104 MMOL/L (ref 98–107)
CO2: 25 MMOL/L (ref 20–31)
CREAT SERPL-MCNC: 0.48 MG/DL (ref 0.5–0.9)
GFR AFRICAN AMERICAN: >60 ML/MIN
GFR NON-AFRICAN AMERICAN: >60 ML/MIN
GFR SERPL CREATININE-BSD FRML MDRD: ABNORMAL ML/MIN/{1.73_M2}
GFR SERPL CREATININE-BSD FRML MDRD: ABNORMAL ML/MIN/{1.73_M2}
GLUCOSE BLD-MCNC: 72 MG/DL (ref 70–99)
HCT VFR BLD CALC: 39.7 % (ref 36.3–47.1)
HEMOGLOBIN: 12.6 G/DL (ref 11.9–15.1)
MCH RBC QN AUTO: 29.8 PG (ref 25.2–33.5)
MCHC RBC AUTO-ENTMCNC: 31.7 G/DL (ref 28.4–34.8)
MCV RBC AUTO: 93.9 FL (ref 82.6–102.9)
NRBC AUTOMATED: 0 PER 100 WBC
PDW BLD-RTO: 13 % (ref 11.8–14.4)
PLATELET # BLD: 258 K/UL (ref 138–453)
PMV BLD AUTO: 10.6 FL (ref 8.1–13.5)
POTASSIUM SERPL-SCNC: 4.2 MMOL/L (ref 3.7–5.3)
RBC # BLD: 4.23 M/UL (ref 3.95–5.11)
SODIUM BLD-SCNC: 139 MMOL/L (ref 135–144)
TSH SERPL DL<=0.05 MIU/L-ACNC: 0.62 MIU/L (ref 0.3–5)
WBC # BLD: 6.3 K/UL (ref 3.5–11.3)

## 2020-01-28 PROCEDURE — 36415 COLL VENOUS BLD VENIPUNCTURE: CPT

## 2020-01-28 PROCEDURE — 80048 BASIC METABOLIC PNL TOTAL CA: CPT

## 2020-01-28 PROCEDURE — 84443 ASSAY THYROID STIM HORMONE: CPT

## 2020-01-28 PROCEDURE — P9603 ONE-WAY ALLOW PRORATED MILES: HCPCS

## 2020-01-28 PROCEDURE — 85027 COMPLETE CBC AUTOMATED: CPT

## 2020-01-29 ENCOUNTER — OFFICE VISIT (OUTPATIENT)
Dept: NEUROLOGY | Age: 30
End: 2020-01-29
Payer: MEDICAID

## 2020-01-29 VITALS
HEART RATE: 95 BPM | HEIGHT: 72 IN | WEIGHT: 110 LBS | SYSTOLIC BLOOD PRESSURE: 95 MMHG | BODY MASS INDEX: 14.9 KG/M2 | DIASTOLIC BLOOD PRESSURE: 60 MMHG

## 2020-01-29 PROCEDURE — 1036F TOBACCO NON-USER: CPT | Performed by: NURSE PRACTITIONER

## 2020-01-29 PROCEDURE — G8484 FLU IMMUNIZE NO ADMIN: HCPCS | Performed by: NURSE PRACTITIONER

## 2020-01-29 PROCEDURE — G8427 DOCREV CUR MEDS BY ELIG CLIN: HCPCS | Performed by: NURSE PRACTITIONER

## 2020-01-29 PROCEDURE — 99214 OFFICE O/P EST MOD 30 MIN: CPT | Performed by: NURSE PRACTITIONER

## 2020-01-29 PROCEDURE — G8419 CALC BMI OUT NRM PARAM NOF/U: HCPCS | Performed by: NURSE PRACTITIONER

## 2020-01-29 RX ORDER — ACETAMINOPHEN 325 MG/1
650 TABLET ORAL ONCE
Status: CANCELLED | OUTPATIENT
Start: 2020-02-03

## 2020-01-29 RX ORDER — METHYLPREDNISOLONE SODIUM SUCCINATE 125 MG/2ML
100 INJECTION, POWDER, LYOPHILIZED, FOR SOLUTION INTRAMUSCULAR; INTRAVENOUS ONCE
Status: CANCELLED | OUTPATIENT
Start: 2020-02-03

## 2020-01-29 RX ORDER — SODIUM CHLORIDE 0.9 % (FLUSH) 0.9 %
5 SYRINGE (ML) INJECTION PRN
Status: CANCELLED | OUTPATIENT
Start: 2020-02-03

## 2020-01-29 RX ORDER — IBUPROFEN 400 MG/1
400 TABLET ORAL EVERY 6 HOURS PRN
COMMUNITY

## 2020-01-29 RX ORDER — DIPHENHYDRAMINE HYDROCHLORIDE 50 MG/ML
25 INJECTION INTRAMUSCULAR; INTRAVENOUS ONCE
Status: CANCELLED | OUTPATIENT
Start: 2020-02-03

## 2020-01-29 RX ORDER — EPINEPHRINE 1 MG/ML
0.3 INJECTION, SOLUTION, CONCENTRATE INTRAVENOUS PRN
Status: CANCELLED | OUTPATIENT
Start: 2020-02-03

## 2020-01-29 RX ORDER — SODIUM CHLORIDE 0.9 % (FLUSH) 0.9 %
10 SYRINGE (ML) INJECTION PRN
Status: CANCELLED | OUTPATIENT
Start: 2020-02-03

## 2020-01-29 RX ORDER — METHYLPREDNISOLONE SODIUM SUCCINATE 125 MG/2ML
125 INJECTION, POWDER, LYOPHILIZED, FOR SOLUTION INTRAMUSCULAR; INTRAVENOUS ONCE
Status: CANCELLED | OUTPATIENT
Start: 2020-02-03

## 2020-01-29 RX ORDER — SODIUM CHLORIDE 9 MG/ML
INJECTION, SOLUTION INTRAVENOUS CONTINUOUS
Status: CANCELLED | OUTPATIENT
Start: 2020-02-03

## 2020-01-29 RX ORDER — DIPHENHYDRAMINE HYDROCHLORIDE 50 MG/ML
50 INJECTION INTRAMUSCULAR; INTRAVENOUS ONCE
Status: CANCELLED | OUTPATIENT
Start: 2020-02-03

## 2020-01-29 RX ORDER — SENNA AND DOCUSATE SODIUM 50; 8.6 MG/1; MG/1
1 TABLET, FILM COATED ORAL 2 TIMES DAILY
COMMUNITY

## 2020-01-29 RX ORDER — HEPARIN SODIUM (PORCINE) LOCK FLUSH IV SOLN 100 UNIT/ML 100 UNIT/ML
500 SOLUTION INTRAVENOUS PRN
Status: CANCELLED | OUTPATIENT
Start: 2020-02-03

## 2020-01-29 NOTE — PROGRESS NOTES
Extensive high signal in the brain parenchyma above and below the tentorium   as described.  Findings are likely due to the patient's multiple sclerosis.       There is no abnormal enhancement or restricted diffusion signal to suggest   recent plaque activity.       There are no comparison exams to assess for stability or interval increase of   the patient's underlying demyelinating process.           MRI C-spine 11/6/19      Impression   Extensive white matter lesions noted in the brainstem and cervical spinal   cord, compatible with demyelinating disease.  No areas of active   demyelination.           MRI T and L spine 11/20/19  Impression   Thoracic spine MRI: Extensive white matter MS plaques in the thoracic cord   without active demyelination.       Lumbar spine MRI: Normal.                    PAST MEDICAL HISTORY:         Diagnosis Date    Arthritis     Asthma     Exacerbation of multiple sclerosis ([facility] 2018    MS (multiple sclerosis) ([facility]     Neuromuscular disorder ([facility]         PAST SURGICAL HISTORY:   No past surgical history on file.      SOCIAL HISTORY:     Social History     Socioeconomic History    Marital status: Single     Spouse name: Not on file    Number of children: Not on file    Years of education: Not on file    Highest education level: Not on file   Occupational History    Not on file   Social Needs    Financial resource strain: Not on file    Food insecurity:     Worry: Not on file     Inability: Not on file    Transportation needs:     Medical: Not on file     Non-medical: Not on file   Tobacco Use    Smoking status: Never Smoker    Smokeless tobacco: Never Used   Substance and Sexual Activity    Alcohol use: No    Drug use: Yes     Types: Marijuana     Comment: smokes daily    Sexual activity: Yes     Birth control/protection: Other-see comments     Comment: does not use birth control d/t intercourse is infrequent   Lifestyle    Physical activity:     Days per week: Not on file     Minutes per session: Not on file    Stress: Not on file   Relationships    Social connections:     Talks on phone: Not on file     Gets together: Not on file     Attends L[employer]-day service: Not on file     Active member of club or organization: Not on file     Attends meetings of clubs or organizations: Not on file     Relationship status: Not on file    Intimate partner violence:     Fear of current or ex partner: Not on file     Emotionally abused: Not on file     Physically abused: Not on file     Forced sexual activity: Not on file   Other Topics Concern    Not on file   Social History Narrative    Not on file       CURRENT MEDICATIONS:     Current Outpatient Medications   Medication Sig Dispense Refill    Multiple Vitamins-Minerals (MULTIVITAMIN PO) Take by mouth daily      sennosides-docusate sodium (SENOKOT-S) 8.6-50 MG tablet Take 1 tablet by mouth 2 times daily      ibuprofen (ADVIL;MOTRIN) 400 MG tablet Take 400 mg by mouth every 6 hours as needed for Pain      acetaminophen (ACETAMINOPHEN 8 HOUR) 650 MG extended release tablet Take 650 mg by mouth every 8 hours as needed for Pain      ondansetron (ZOFRAN-ODT) 4 MG disintegrating tablet Take 1 tablet by mouth every 6 hours as needed for Nausea (Patient taking differently: Take 4 mg by mouth every 8 hours as needed for Nausea )      levothyroxine (SYNTHROID) 50 MCG tablet Take 1 tablet by mouth Daily 30 tablet 3    Cholecalciferol (VITAMIN D3) 25 MCG (1000 UT) TABS Take 1 tablet by mouth daily Begin after patient completes her 50,000 units weekly x7 additional weeks (Patient taking differently: Take 50,000 Units by mouth daily Give 1 tablet by mouth one time a day every Wednesday for prophylactic for 7 weeks.) 90 tablet 1    bisacodyl (DULCOLAX) 10 MG suppository Place 10 mg rectally daily      ferrous sulfate 325 (65 Fe) MG tablet Take 325 mg by mouth daily (with breakfast)      Magnesium Hydroxide (MILK OF MAGNESIA PO) Take PHYSICAL EXAMINATION:       There were no vitals filed for this visit.                                            .                                                                                                    General Appearance:  Alert, cooperative, no signs of distress, appears stated age   Head:  Normocephalic, no signs of trauma   Eyes:  Conjunctiva/corneas clear;  eyelids intact   Ears:  Normal external ear and canals   Nose: Nares normal, mucosa normal, no drainage    Throat: Lips and tongue normal; teeth normal;  gums normal   Neck: Supple, intact flexion, extension and rotation;   trachea midline;  no adenopathy;   thyroid: not enlarged;   no carotid pulse abnormality   Back:   Symmetric, no curvature, ROM adequate   Lungs:   Respirations unlabored   Heart:  Regular rate and rhythm           Extremities: Extremities normal, no cyanosis, no edema   Pulses: Symmetric over head and neck   Skin: Skin color, texture normal, no rashes, no lesions                                     NEUROLOGIC EXAMINATION    Neurologic Exam  Mental status    Alert and oriented x 3; intact memory with no confusion, speech or language problems; no hallucinations or delusions  Fund of information appropriate for level of education    Cranial nerves    II - visual fields intact to confrontation bilaterally  III, IV, VI - extra-ocular muscles full: no pupillary defect; no NIKITA, no nystagmus, no ptosis   V - normal facial sensation                                                               VII - normal facial symmetry                                                             VIII - intact hearing                                                                             IX, X - symmetrical palate                                                                  XI - symmetrical shoulder shrug                                                       XII - tongue midline without atrophy or fasciculation      Motor function Normal muscle bulk and tone; strength 5/5 on all 4 extremities, no pronator drift      Sensory function Intact to light touch, pinprick, vibration, proprioception on all 4 extremities      Cerebellar Intact fine motor movement. No involuntary movements or tremors. No ataxia or dysmetria on finger to nose or heel to shin testing      Reflex function DTR 2+ on bilateral UE and LE, symmetric. Negative Babinski      Gait                   normal base and arm swing                  ASSESSMENT AND PLAN:           In summary, your patient, Katie Kirk exhibits the following, with associated plan:    1. Primary progressive multiple sclerosis with chronic paraplegia and new onset of right upper extremity weakness. Patient recently was hospitalized and received Solu-Medrol 1 g daily for 3 days. 1. Obtain previously ordered hepatitis B laboratory testing  2. Complete Ocrevus administration forms  3. Hopefully initiate Ocrevus within a week  4. Continue physical and Occupational Therapy  5.  Patient will return in 6 to 8 weeks for reevaluation            Signed: Cuco Alvarado, JAMILA      *Please note that portions of this note were completed with a voice recognition program.  Although every effort was made to insure the accuracy of this automated transcription, some errors in transcription may have occurred, occasionally words and are mis-transcribed

## 2020-01-30 ENCOUNTER — TELEPHONE (OUTPATIENT)
Dept: NEUROLOGY | Age: 30
End: 2020-01-30

## 2020-01-30 ENCOUNTER — TELEPHONE (OUTPATIENT)
Dept: ONCOLOGY | Age: 30
End: 2020-01-30

## 2020-01-30 ENCOUNTER — TELEPHONE (OUTPATIENT)
Dept: INFUSION THERAPY | Facility: MEDICAL CENTER | Age: 30
End: 2020-01-30

## 2020-01-30 NOTE — TELEPHONE ENCOUNTER
I received a call from Minda Sparrow with [facility]. They were reviewing the chart for Ocrevus and see that she is paraplegic. Unfortunately they can't accomodate anyone that is not able to walk/transfer on their own. They do not have staff for that.  She is referring her to [facility] at [facility]. V's

## 2020-01-30 NOTE — TELEPHONE ENCOUNTER
I RECEIVED AN ORDER FROM GARCIA BIRCH'S OFFICE  FOR OCREVUS AND IN THE NURSING NOTES IT STATES THAT SCOTT  DIAGNOSIS  IS CHRONIC PARAPLEGIA. I TALKED WITH MY SUPERVISOR SINCE PTS THAT COME HERE MUST BE MOBILE  SINCE WE DO NOT HAVE THE STAFF TO [facility] OR THE EQUIPMENT. MY SUPERVISOR RECOMMENDED THE PT GO TO  AT [facility]. I CALLED AND SPOKE WITH MISTY AT [address] [place] [address] AND EXPLAINED THE ABOVE AND MISTY SAID THAT WOULD BE FINE. I OFFERED TO SEND THE ORDER TO  VIA FAX AND ALSO TO CHANGE THE REFERRAL TO  SO THEY HAVE TO ORDER, MISTY APPRECIATED THAT AND WILL  LET GARCIA KNOW ON Monday.

## 2020-01-31 RX ORDER — HYDROXYZINE HYDROCHLORIDE 25 MG/1
25 TABLET, FILM COATED ORAL ONCE
Status: CANCELLED
Start: 2020-02-03

## 2020-02-20 NOTE — TELEPHONE ENCOUNTER
I called [facility] and they will fax labs that are back. She has to have the JCV drawn again because there was not enough specimen.

## 2020-02-24 ENCOUNTER — TELEPHONE (OUTPATIENT)
Dept: NEUROLOGY | Age: 30
End: 2020-02-24

## 2020-03-06 ENCOUNTER — TELEPHONE (OUTPATIENT)
Dept: ONCOLOGY | Age: 30
End: 2020-03-06

## 2020-03-06 ENCOUNTER — OFFICE VISIT (OUTPATIENT)
Dept: NEUROLOGY | Age: 30
End: 2020-03-06
Payer: MEDICAID

## 2020-03-06 VITALS
WEIGHT: 111 LBS | SYSTOLIC BLOOD PRESSURE: 104 MMHG | HEIGHT: 72 IN | BODY MASS INDEX: 15.03 KG/M2 | DIASTOLIC BLOOD PRESSURE: 67 MMHG | HEART RATE: 112 BPM

## 2020-03-06 PROCEDURE — G8484 FLU IMMUNIZE NO ADMIN: HCPCS | Performed by: PSYCHIATRY & NEUROLOGY

## 2020-03-06 PROCEDURE — G8427 DOCREV CUR MEDS BY ELIG CLIN: HCPCS | Performed by: PSYCHIATRY & NEUROLOGY

## 2020-03-06 PROCEDURE — 1036F TOBACCO NON-USER: CPT | Performed by: PSYCHIATRY & NEUROLOGY

## 2020-03-06 PROCEDURE — 99214 OFFICE O/P EST MOD 30 MIN: CPT | Performed by: PSYCHIATRY & NEUROLOGY

## 2020-03-06 PROCEDURE — G8419 CALC BMI OUT NRM PARAM NOF/U: HCPCS | Performed by: PSYCHIATRY & NEUROLOGY

## 2020-03-06 RX ORDER — FLUTICASONE PROPIONATE 50 MCG
SPRAY, SUSPENSION (ML) NASAL
COMMUNITY
Start: 2019-12-29

## 2020-03-06 RX ORDER — POTASSIUM CHLORIDE 20 MEQ/1
TABLET, EXTENDED RELEASE ORAL
COMMUNITY
Start: 2020-02-11

## 2020-03-06 RX ORDER — IPRATROPIUM BROMIDE AND ALBUTEROL SULFATE 2.5; .5 MG/3ML; MG/3ML
SOLUTION RESPIRATORY (INHALATION)
COMMUNITY
Start: 2020-02-10

## 2020-03-06 RX ORDER — BACLOFEN 10 MG/1
10 TABLET ORAL 3 TIMES DAILY
Qty: 90 TABLET | Refills: 3 | Status: SHIPPED | OUTPATIENT
Start: 2020-03-06 | End: 2020-06-16 | Stop reason: SDUPTHER

## 2020-03-06 RX ORDER — ONDANSETRON 4 MG/1
TABLET, FILM COATED ORAL
COMMUNITY
Start: 2020-02-08

## 2020-03-06 RX ORDER — GABAPENTIN 100 MG/1
CAPSULE ORAL
Qty: 90 CAPSULE | Refills: 5 | Status: SHIPPED | OUTPATIENT
Start: 2020-03-06 | End: 2020-06-16 | Stop reason: SDUPTHER

## 2020-03-06 RX ORDER — LACTULOSE 10 G/15ML
SOLUTION ORAL; RECTAL
COMMUNITY
Start: 2020-02-10 | End: 2020-10-05

## 2020-03-06 RX ORDER — DIAZEPAM 2 MG/1
TABLET ORAL
COMMUNITY
Start: 2020-02-10 | End: 2020-10-05 | Stop reason: ALTCHOICE

## 2020-03-06 NOTE — PROGRESS NOTES
NEUROLOGY FOLLOW-UP  Patient Name:  Soila Baires  :   1990  Clinic Visit Date: 3/6/2020        REVIEW OF SYSTEMS  Constitutional Weight changes: present, Fevers : present, Fatigue: present; Any recent hospitalizations:  absent.    HEENT Ears: normal, Eyes: no correction, Visual disturbance: absent   Reespiratory Shortness of breath: present, Cough: absent   Cardivascular Chest pain: absent, Leg swelling :absent   GI Constipation: present, Diarrhea: absent, Swallowing change: absent    Urinary frequency: absent, Urinary urgency: absent, Urinary incontinence: absent   Musculoskeletal Neck pain: absent, Back pain: present, Stiffness: present, Muscle pain: present, Joint pain: present   Dermatological Hair loss: absent, Skin changes: absent   Neurological Memory loss: absent, Confusion: absent, Seizures: absent; Trouble walking or imbalance: absent, Dizziness: absent, Weakness: absent, Numbness absent, Tremor: absent, Spasm: absent, Speech difficulty: absent, Headache: absent, Light sensitivity: absent   Psychiatric Anxiety: absent, Depression  absent, Suicidal ideations absent   Hematologic Abnormal bleeding: absent, Anemia: absent, Clotting disorder: absent, Lymph gland changes: absent

## 2020-03-07 PROBLEM — Z80.3 FAMILY HISTORY OF BREAST CANCER: Status: ACTIVE | Noted: 2020-03-07

## 2020-03-07 PROBLEM — R76.8 JC VIRUS ANTIBODY POSITIVE: Status: ACTIVE | Noted: 2020-03-07

## 2020-03-12 NOTE — TELEPHONE ENCOUNTER
She saw Dr. Skyla Martínez in follow up . He ordered a referral to infectious disease and to oncology.

## 2020-05-07 ENCOUNTER — HOSPITAL ENCOUNTER (OUTPATIENT)
Facility: MEDICAL CENTER | Age: 30
End: 2020-05-07
Payer: MEDICAID

## 2020-05-11 ENCOUNTER — TELEPHONE (OUTPATIENT)
Dept: ONCOLOGY | Age: 30
End: 2020-05-11

## 2020-05-19 ENCOUNTER — TELEPHONE (OUTPATIENT)
Dept: ONCOLOGY | Age: 30
End: 2020-05-19

## 2020-05-22 ENCOUNTER — TELEPHONE (OUTPATIENT)
Dept: ONCOLOGY | Age: 30
End: 2020-05-22

## 2020-05-26 ENCOUNTER — HOSPITAL ENCOUNTER (OUTPATIENT)
Facility: MEDICAL CENTER | Age: 30
End: 2020-05-26
Payer: MEDICAID

## 2020-06-01 ENCOUNTER — INITIAL CONSULT (OUTPATIENT)
Dept: ONCOLOGY | Age: 30
End: 2020-06-01
Payer: MEDICAID

## 2020-06-01 ENCOUNTER — TELEPHONE (OUTPATIENT)
Dept: ONCOLOGY | Age: 30
End: 2020-06-01

## 2020-06-01 VITALS
HEIGHT: 70 IN | DIASTOLIC BLOOD PRESSURE: 60 MMHG | TEMPERATURE: 97.8 F | HEART RATE: 106 BPM | BODY MASS INDEX: 15.93 KG/M2 | SYSTOLIC BLOOD PRESSURE: 105 MMHG

## 2020-06-01 PROCEDURE — 99244 OFF/OP CNSLTJ NEW/EST MOD 40: CPT | Performed by: INTERNAL MEDICINE

## 2020-06-01 PROCEDURE — 99202 OFFICE O/P NEW SF 15 MIN: CPT

## 2020-06-01 PROCEDURE — G8419 CALC BMI OUT NRM PARAM NOF/U: HCPCS | Performed by: INTERNAL MEDICINE

## 2020-06-01 PROCEDURE — G8427 DOCREV CUR MEDS BY ELIG CLIN: HCPCS | Performed by: INTERNAL MEDICINE

## 2020-06-01 NOTE — TELEPHONE ENCOUNTER
SCOTT ARRIVES VIA WHEELCHAIR FOR MD CONSULTATION  DR Re Moran IN TO SEE PATIENT  ORDERS RECEIVED  MAMMOGRAM & ULTRASOUND SOON  RV 2 WEEKS  MAMMOGRAM & ULTRASOUND 6/9/20 @ [facility]  MD VISIT 6/15/20 @ 2:15 PM  AVS PRINTED AND GIVEN TO PATIENT W/ INSTRUCTIONS  PATIENT DISCHARGED VIA WHEELCHAIR

## 2020-06-09 ENCOUNTER — HOSPITAL ENCOUNTER (OUTPATIENT)
Dept: WOMENS IMAGING | Age: 30
Discharge: HOME | End: 2020-06-11
Payer: MEDICAID

## 2020-06-09 ENCOUNTER — TELEPHONE (OUTPATIENT)
Dept: ONCOLOGY | Age: 30
End: 2020-06-09

## 2020-06-09 PROCEDURE — 76642 ULTRASOUND BREAST LIMITED: CPT

## 2020-06-09 NOTE — TELEPHONE ENCOUNTER
RECEIVED PHONE [address] AT [facility] [phone] @ [phone]. SCOTT IS IN DEPARTMENT FOR DIAGNOSTIC MAMMOGRAM AND BELIEVES \" SHE COULD BE PREGNANT\"    WRITER WAS NOT ABLE TO RETURN CALL UNTIL 1550 AND HAD TO LEAVE . INQUIRED IF THEY HAD CALLED MD? OR IF TESTING HAD BEEN DONE? AWAIT RETURN CALL.

## 2020-06-09 NOTE — TELEPHONE ENCOUNTER
SPOKE WITH DR Ok Mcmillan AND NO ONE HAD CONTACTED HIM R/T BELOW ENTRY. HE WOULD LIKE UPDATED ONCE [facility] CALL BACKS WITH UPDATES.

## 2020-06-10 ENCOUNTER — HOSPITAL ENCOUNTER (OUTPATIENT)
Facility: MEDICAL CENTER | Age: 30
End: 2020-06-10
Payer: MEDICAID

## 2020-06-16 ENCOUNTER — TELEPHONE (OUTPATIENT)
Dept: NEUROLOGY | Age: 30
End: 2020-06-16

## 2020-06-16 ENCOUNTER — OFFICE VISIT (OUTPATIENT)
Dept: NEUROLOGY | Age: 30
End: 2020-06-16
Payer: MEDICAID

## 2020-06-16 VITALS
TEMPERATURE: 97 F | DIASTOLIC BLOOD PRESSURE: 60 MMHG | RESPIRATION RATE: 20 BRPM | HEART RATE: 78 BPM | WEIGHT: 112 LBS | BODY MASS INDEX: 16.03 KG/M2 | SYSTOLIC BLOOD PRESSURE: 110 MMHG | HEIGHT: 70 IN

## 2020-06-16 PROCEDURE — G8419 CALC BMI OUT NRM PARAM NOF/U: HCPCS | Performed by: PSYCHIATRY & NEUROLOGY

## 2020-06-16 PROCEDURE — 1036F TOBACCO NON-USER: CPT | Performed by: PSYCHIATRY & NEUROLOGY

## 2020-06-16 PROCEDURE — 99214 OFFICE O/P EST MOD 30 MIN: CPT | Performed by: PSYCHIATRY & NEUROLOGY

## 2020-06-16 PROCEDURE — G8427 DOCREV CUR MEDS BY ELIG CLIN: HCPCS | Performed by: PSYCHIATRY & NEUROLOGY

## 2020-06-16 RX ORDER — GABAPENTIN 100 MG/1
CAPSULE ORAL
Qty: 90 CAPSULE | Refills: 3 | Status: SHIPPED | OUTPATIENT
Start: 2020-06-16 | End: 2020-09-24 | Stop reason: SDUPTHER

## 2020-06-16 RX ORDER — BACLOFEN 10 MG/1
10 TABLET ORAL 3 TIMES DAILY
Qty: 90 TABLET | Refills: 3 | Status: SHIPPED | OUTPATIENT
Start: 2020-06-16 | End: 2020-09-24 | Stop reason: SDUPTHER

## 2020-06-16 RX ORDER — MELATONIN
Qty: 30 TABLET | Refills: 3 | Status: SHIPPED | OUTPATIENT
Start: 2020-06-16 | End: 2020-09-24 | Stop reason: SDUPTHER

## 2020-06-16 NOTE — PROGRESS NOTES
NIKITA, no ptosis  V     -     Normal facial sensation  VII    -     Normal facial symmetry  VIII   -     Intact hearing  IX,X -     Symmetrical palate  XI    -     Symmetrical shoulder shrug  XII   -     Midline tongue, no atrophy    MOTOR FUNCTION:  significant for weakness of grade 0/5 in bilateral lower extremities and -4/5 in right upper extremity and 5/5 in left upper extremity with atrophy in right upper extremity and spasticity in bilateral lower extremities with no tremors. SENSORY FUNCTION:  Normal touch, normal pin bilaterally   CEREBELLAR FUNCTION:  Intact fine motor control over left UE but limited Rt UE   REFLEX FUNCTION:  Hyperactive DTRs with bilateral ankle clonus and bilateral extensor plantars    STATION and GAIT  wheelchair bound; deferred        MRI brain (w/wo) 11/5/2019: Extensive high signal in brain parenchyma above and below the tentorium. No abnormal enhancement are restricted diffusion signal to suggest recent plaque activity. No comparison exams to assess for stability or interval increase of patient's underlying demyelinating process.         MRI Cervical Spine (11/6/19):  Extensive white matter lesions noted in the brainstem and cervical spinal  cord, compatible with demyelinating disease.  No areas of active  demyelination.       Hepatitis B surface antigen 2/19/2020: Nonreactive. Hepatitis B surface antibody 2/19/2020: Reactive  Hep Be antibody 2/19/2020: Negative.     OUMOU virus antibody titer 2/24/20:   JCV antibody: Positive.,  Index value: 3.74     Varicella zoster Ab, Ig G 2/19/20: Immune              Impression and Plan: Ms. Antonia Wylie is a 26 y.o. female with   Primary Progressive multiple Sclerosis diagnosed in 2011  Waiting for clearance from oncologist, Dr. Ripley Riedel office for IV Ocrevus infusion       Hx of hosp (11/4/19 & Feb 2020 & March 2020 ) for c/o Rt arm and Rt Leg weakness; s/p IV Pulse steroid therapy; has had improvement with steroids every time  Chronic paraplegia  OUMOU Virus +ve with Ab titre 3.74; Recent Brain MRI (11/5/19)  without any changes suggesting PML; to cont monitoring  Vitamin D deficiency: was on Vitamin D supplements    Reactive hepatitis B: will get office visit note from ID regarding initial consultation note in Feb or March; patient is not sure exactly. Thyroid disease; likely hypopituitarism   Spasticity and dysesthesias     Will continue Baclofen and Gabapentin for spasticity and dysthesias  Also to continue vitamin D supplements and thyroid supplements. Continue PT/ OT. She will be initiated on IV Ocrevus infusion once clearance obtained. Follow-up in neurology clinic in 6-8 weeks.       Please note that portions of this note were completed with a voice recognition program.  Although every effort was made to ensure the accuracy of this automated transcription, some errors in transcription may have occurred; occasionally words are mis-transcribed. Thank you for understanding.

## 2020-06-17 ENCOUNTER — TELEPHONE (OUTPATIENT)
Dept: NEUROLOGY | Age: 30
End: 2020-06-17

## 2020-06-17 NOTE — TELEPHONE ENCOUNTER
Yazmin Castro returned my call. I gave her the phone number for [facility] physician referral. I explained that she could talk to them and see if they could help her find a PCP on her insurance. I also told her that she can contact her insurance, [employer], to see who was a provider for that plan. I ask her to call me back if she had any questions or problems. She voiced understanding.

## 2020-07-01 ENCOUNTER — HOSPITAL ENCOUNTER (OUTPATIENT)
Facility: MEDICAL CENTER | Age: 30
End: 2020-07-01

## 2020-08-21 ENCOUNTER — TELEPHONE (OUTPATIENT)
Dept: NEUROLOGY | Age: 30
End: 2020-08-21

## 2020-08-21 NOTE — TELEPHONE ENCOUNTER
We received two faxes yesterday from [place] [id number] with [address] [place] [address]. The first was asking for additional contact information. The second one said to disregard that fax and requested a prescription for a celio lift. Please place order if appropriate. If ordered I will fax it to [phone] as requested.

## 2020-08-24 NOTE — TELEPHONE ENCOUNTER
Please let the patient know that I tried for that to put in an order but I couldn't find any in equipment order sheets.    Thank you.   -dr. Maryann Jain

## 2020-09-09 ENCOUNTER — TELEPHONE (OUTPATIENT)
Dept: NEUROLOGY | Age: 30
End: 2020-09-09

## 2020-09-09 NOTE — TELEPHONE ENCOUNTER
Levi Ceballos with [facility] called to see if you would follow Pavithra Townsend for her home health care? They got a referral from her waiver case manger for nursing, a home health aid for 28 hours per week and eval for PT and OT. Please advise. They will need the last office not faxed to them at [phone].

## 2020-09-11 NOTE — TELEPHONE ENCOUNTER
Brett Benitez  Can please provide info to patient regarding some of the pcps who are taking new patients.      Thank you.   -dr. Gama Riojas

## 2020-09-11 NOTE — TELEPHONE ENCOUNTER
A call was placed to the patient. It went to voice mail. The mail box was full. I could not leave a message.

## 2020-09-24 ENCOUNTER — OFFICE VISIT (OUTPATIENT)
Dept: NEUROLOGY | Age: 30
End: 2020-09-24
Payer: MEDICAID

## 2020-09-24 VITALS
BODY MASS INDEX: 16.03 KG/M2 | HEIGHT: 70 IN | WEIGHT: 112 LBS | DIASTOLIC BLOOD PRESSURE: 64 MMHG | HEART RATE: 79 BPM | SYSTOLIC BLOOD PRESSURE: 106 MMHG

## 2020-09-24 PROCEDURE — 99214 OFFICE O/P EST MOD 30 MIN: CPT | Performed by: PSYCHIATRY & NEUROLOGY

## 2020-09-24 RX ORDER — GABAPENTIN 100 MG/1
CAPSULE ORAL
Qty: 90 CAPSULE | Refills: 3 | Status: SHIPPED | OUTPATIENT
Start: 2020-09-24 | End: 2021-09-24

## 2020-09-24 RX ORDER — BACLOFEN 10 MG/1
10 TABLET ORAL 3 TIMES DAILY
Qty: 90 TABLET | Refills: 3 | Status: SHIPPED | OUTPATIENT
Start: 2020-09-24 | End: 2021-09-24

## 2020-09-24 RX ORDER — MELATONIN
Qty: 30 TABLET | Refills: 3 | Status: SHIPPED | OUTPATIENT
Start: 2020-09-24

## 2020-09-24 NOTE — LETTER
[facility] Neurology Specialist  [address]  [address]  Phone: [phone]  Fax: [phone]    Oswald Sarmiento MD        2020     No primary care provider on file. No primary provider on file. Patient: Kacey Duncan  MR Number: [id number]  YOB: 1990  Date of Visit: 2020    Dear Dr. Bertrand Sterling primary care provider on file.:    Thank you for the request for consultation for Kacey Duncan to me for the evaluation of Basil Grant  Below are the relevant portions of my assessment and plan of care. NEUROLOGY FOLLOW-UP  Patient Name:  Kacey Duncan  :   1990  Clinic Visit Date: 2020    I saw Ms. Kacey Duncan in follow-up in the office today in continuation of neurologic care. She is a 26 y.o. right handed female with Primary progressive multiple sclerosis. Today she comes to clinic accompanied by her mom, Marce Aguilar. Patient was recently hospitalized at [facility] on 9/10/20 for multiple sclerosis symptoms' exacerbation. She was treated with IV Solu-Medrol for 3 days. During the last visit, discussion done about IV ocrelizumab for primary progressive MS. Because of family history of breast cancer; she needed to get clearance from oncologist; she did see Dr. Vinny Chambers; patient was found to have a nodular mass in breast for which she was advised with mammogram.  Patient did not keep any follow-up visit with Dr. Vinny Chambers again afterwards. Of note; she had chronic bilateral lower extremity paraplegia since  and has been wheelchair dependent since 2017 with underlying primary progressive multiple sclerosis. Then patient has had multiple hospitalizations symptoms suggesting MS progression; requiring IV steroid therapy. Multiple discussions done in the past for the need to be on disease modifying therapy. Discussed and reviewed literature with the patient regarding different options.   Patient reviewed the literature and she wanted IV Ocrevus. She has strong family hx of breast cancer in her paternal grandma and sister of paternal grandma. She is requesting for refills on vitamin D and also for gabapentin for pain and cramps along with the painful sensations in bilateral lower extremities. Pins-and-needles sensations occur in bilateral lower extremities. Has not been on any antispasmodics. Previous Neurological Work-up:   CT head 11/4/2019: No acute intracranial abnormality. Patchy white matter disease consistent with history of multiple sclerosis. Mild generalized atrophy for patient's age. MRI brain (w/wo) 11/5/2019: Extensive high signal in brain parenchyma above and below the tentorium. No abnormal enhancement are restricted diffusion signal to suggest recent plaque activity. No comparison exams to assess for stability or interval increase of patient's underlying demyelinating process. Vitamin D 25-hydroxy level 11/5/2019: 7.9  Vitamin B12 level: 461  TSH level 11/5/2019: 0.2 (0.3 5)  Free T4 level 11/5/2019:  0.76 (0.9-1.7)   MRI Cervical Spine (11/6/19):  Extensive white matter lesions noted in the brainstem and cervical spinal  cord, compatible with demyelinating disease.  No areas of active  demyelination. DISEASE SUMMARY  Date of onset: 2011  Date of diagnosis of MS: 2011  Disease course at onset: ? Relapsing-Remitting; has had multiple hosp \"~ 10 hosp\" for iv steroids as per patient  Current disease course: Progressive multiple sclerosis  Previous disease therapies: Copaxone and Gilenya  Current disease therapy: none  CSF:   JCV serology result and date: OUMOU virus +ve ; index value 3.74 (2/24/20).    Vitamin D: 7.9 (11/5/2019)  Smoking status: none  EDSS: 7.5 consisting of paraplegia; dysmetria; sensory impairment  9HPT:  T25W:     Review of systems done by staff reviewed and pertinent positives include Balance difficulties, dizziness, weakness, numbness, spasticity, neck pain, back pain, stiffness, joint pains and recent hosp as stated above. Current Outpatient Medications on File Prior to Visit   Medication Sig Dispense Refill    vitamin D3 (CHOLECALCIFEROL) 25 MCG (1000 UT) TABS tablet Once daily 30 tablet 3    baclofen (LIORESAL) 10 MG tablet Take 1 tablet by mouth 3 times daily 90 tablet 3    gabapentin (NEURONTIN) 100 MG capsule TAKE ONE CAP TID 90 capsule 3    diazePAM (VALIUM) 2 MG tablet       ondansetron (ZOFRAN) 4 MG tablet       potassium chloride (KLOR-CON M) 20 MEQ extended release tablet       ENULOSE 10 GM/15ML SOLN solution       ipratropium-albuterol (DUONEB) 0.5-2.5 (3) MG/3ML SOLN nebulizer solution       fluticasone (FLONASE) 50 MCG/ACT nasal spray       neomycin-polymyxin-hydrocortisone (CORTISPORIN) 3.5-61087-5 otic solution       Multiple Vitamins-Minerals (MULTIVITAMIN PO) Take by mouth daily      sennosides-docusate sodium (SENOKOT-S) 8.6-50 MG tablet Take 1 tablet by mouth 2 times daily      ibuprofen (ADVIL;MOTRIN) 400 MG tablet Take 400 mg by mouth every 6 hours as needed for Pain      acetaminophen (ACETAMINOPHEN 8 HOUR) 650 MG extended release tablet Take 650 mg by mouth every 8 hours as needed for Pain      bisacodyl (DULCOLAX) 10 MG suppository Place 10 mg rectally daily      ferrous sulfate 325 (65 Fe) MG tablet Take 325 mg by mouth daily (with breakfast)      Magnesium Hydroxide (MILK OF MAGNESIA PO) Take 30 mLs by mouth as needed      ondansetron (ZOFRAN-ODT) 4 MG disintegrating tablet Take 1 tablet by mouth every 6 hours as needed for Nausea (Patient not taking: Reported on 6/1/2020)      levothyroxine (SYNTHROID) 50 MCG tablet Take 1 tablet by mouth Daily (Patient not taking: Reported on 6/1/2020) 30 tablet 3     No current facility-administered medications on file prior to visit. Allergies: Chase Chávez is allergic to ativan [lorazepam]; benadryl [diphenhydramine]; and chocolate.     Past Medical History: Diagnosis Date    Acquired central hypothyroidism 11/7/2019    Arthritis     Asthma     Encounter for medication monitoring 11/14/2019    Exacerbation of [facility] sclerosis ([facility] 2018    Family history of breast cancer 3/7/2020    OUMOU virus antibody positive 3/7/2020    Lower paraplegia ([facility] [address] 11/14/2019    Marijuana smoker, continuous 11/5/2019    MS (multiple sclerosis) ([facility] [address] [facility]     Neuromuscular disorder ([facility] [address]     Primary chronic progressive multiple sclerosis ([facility] [address] 11/14/2019    Rash in adult 11/5/2019       No past surgical history on file. Social History: Richard Glover  reports that she has never smoked. She has never used smokeless tobacco. She reports current drug use. Drug: Marijuana. She reports that she does not drink alcohol. Family History   Problem Relation Age of Onset    No Known Problems Mother     Stroke Father     No Known Problems Sister     No Known Problems Brother      On exam:Blood pressure 106/64, pulse 79, height 5' 10\" (1.778 m), weight 112 lb (50.8 kg).     NEUROLOGIC EXAMINATION  GENERAL  Appears comfortable and in no distress   HEENT  NC/ AT   NECK  Supple and no bruits heard   MENTAL STATUS:  Alert, oriented, intact memory, no confusion, normal speech, normal language, no hallucination or delusion; appropriate affect   CRANIAL NERVES: II     -      Pupils reactive bilaterally; fundus exam revealed intact venous pulsations; visual fields intact to confrontation  III,IV,VI -  EOMs full, no afferent defect, no                      NIKITA, no ptosis  V     -     diminished light touch and pinprick on right side   VII    -     Normal facial symmetry  VIII   -     Intact hearing  IX,X -     Symmetrical palate  XI    -     Symmetrical shoulder shrug  XII   -     Midline tongue, no atrophy    MOTOR FUNCTION:  significant for weakness of grade 0/5 in bilateral lower extremities and -4/5 in right upper extremity and 5/5 in left upper extremity with atrophy in right upper extremity and spasticity in bilateral lower extremities with no tremors. SENSORY FUNCTION:  Diminished pinprick on right side; diminished light touch and pinprick and temperature in bilateral lower extremities    CEREBELLAR FUNCTION:  Dysmetria to finger-nose-finger testing in bilateral upper extremities    REFLEX FUNCTION:  Hyperactive DTRs with bilateral ankle clonus and bilateral extensor plantars    STATION and GAIT  wheelchair bound; deferred        MRI brain (w/wo) 11/5/2019: Extensive high signal in brain parenchyma above and below the tentorium. No abnormal enhancement are restricted diffusion signal to suggest recent plaque activity. No comparison exams to assess for stability or interval increase of patient's underlying demyelinating process.         MRI Cervical Spine (11/6/19):  Extensive white matter lesions noted in the brainstem and cervical spinal  cord, compatible with demyelinating disease.  No areas of active  demyelination.       Hepatitis B surface antigen 2/19/2020: Nonreactive. Hepatitis B surface antibody 2/19/2020: Reactive  Hep Be antibody 2/19/2020: Negative. OUMOU virus antibody titer 2/24/20:   JCV antibody: Positive.,  Index value: 3.74     Varicella zoster Ab, Ig G 2/19/20: Immune    MRI brain, done at [facility] [address] (9/11/2020): Very numerous demyelinating plaques identified within cerebral hemispheres bilaterally, cerebellum bilaterally and within the brainstem. There is progressive involvement of a plaque within the upper midline medulla. There are 2 possibly 3 new enhancing demyelinating plaques identified. The largest is a curvilinear juxtacortical plaque in the inferior lateral left frontal lobe. There is continued atrophy/thinning of the corpus callosum with demyelinating plaques within corpus callosum.     MRI cervical spine, done at [facility] AT [facility], [facility] [address] (9/11/2020): Very numerous nonenhancing demyelinating plaques again identified within the cervical spinal cord. No active enhancing spinal cord plaques identified. MRI thoracic spine,  done at [facility] AT [place], [place] (9/11/2020):  Very numerous nonenhancing demyelinating plaques again identified within thoracic spinal cord. No active enhancing spinal cord plaques identified            Impression and Plan: Ms. Adeline Brand is a 26 y.o. female with   Primary Progressive multiple Sclerosis diagnosed in 2011; multiple hosp for IV steroid rx; [facility] hosp [facility]11/13 [facility] as above. Waiting for clearance from oncologist, Dr. Han's office for IV Ocrevus infusion; mom states \"I will make sure that  patient keeps follow-up with Dr. Kerline Tejada"  and bring her back to the office for follow-up visit accordingly. Hx of multiple hosp (11/4/19 & Feb 2020 & March 2020& Sept2020 ) for right-sided weakness; right-sided spasticity; s/p Pulse steroid therapy  Chronic paraplegia  OUMOU Virus +ve with Ab titre 3.74; Recent Brain MRI (11/5/19)  without any changes suggesting PML; to cont monitoring  Vitamin D deficiency: was on Vitamin D supplements    Reactive hepatitis B: will get office visit note from ID regarding initial consultation note in Feb or March; patient is not sure exactly. Thyroid disease; likely hypopituitarism   Spasticity and dysesthesias     Will continue Baclofen and Gabapentin for spasticity and dysthesias  Also to continue vitamin D supplements and thyroid supplements. Continue PT/ OT. Disease modifying therapy:  Discussion done in the past regarding various disease modifying therapies for progressive multiple sclerosis; these include Siponimod, ocrelizumab, rituximab, mitoxantrone, cyclophosphamide & T etc. etc.  She preferred IV eculizumab therapy. She will be initiated on IV Ocrevus infusion once clearance obtained.   Follow-up in neurology clinic in 6-8 weeks.    Please note that portions of this note were completed with a voice recognition program.  Although every effort was made to ensure the accuracy of this automated transcription, some errors in transcription may have occurred; occasionally words are mis-transcribed. Thank you for understanding. If you have questions, please do not hesitate to call me. I look forward to following Kyler Infante along with you.     Sincerely,        Olamide Ferreira MD

## 2020-09-24 NOTE — PROGRESS NOTES
atrophy for patient's age. MRI brain (w/wo) 11/5/2019: Extensive high signal in brain parenchyma above and below the tentorium. No abnormal enhancement are restricted diffusion signal to suggest recent plaque activity. No comparison exams to assess for stability or interval increase of patient's underlying demyelinating process. Vitamin D 25-hydroxy level 11/5/2019: 7.9  Vitamin B12 level: 461  TSH level 11/5/2019: 0.2 (0.3 -5)  Free T4 level 11/5/2019:  0.76 (0.9-1.7)   MRI Cervical Spine (11/6/19):  Extensive white matter lesions noted in the brainstem and cervical spinal  cord, compatible with demyelinating disease.  No areas of active  demyelination. DISEASE SUMMARY  Date of onset: 2011  Date of diagnosis of MS: 2011  Disease course at onset: ? Relapsing-Remitting; has had multiple hosp \"~ 10 hosp\" for iv steroids as per patient  Current disease course: Progressive multiple sclerosis  Previous disease therapies: Copaxone and Gilenya  Current disease therapy: none  CSF:   JCV serology result and date: OUMOU virus +ve ; index value 3.74 (2/24/20). Vitamin D: 7.9 (11/5/2019)  Smoking status: none  EDSS: 7.5 consisting of paraplegia; dysmetria; sensory impairment  9HPT:  T25W:     Review of systems done by staff reviewed and pertinent positives include Balance difficulties, dizziness, weakness, numbness, spasticity, neck pain, back pain, stiffness, joint pains and recent hosp as stated above.     Current Outpatient Medications on File Prior to Visit   Medication Sig Dispense Refill    vitamin D3 (CHOLECALCIFEROL) 25 MCG (1000 UT) TABS tablet Once daily 30 tablet 3    baclofen (LIORESAL) 10 MG tablet Take 1 tablet by mouth 3 times daily 90 tablet 3    gabapentin (NEURONTIN) 100 MG capsule TAKE ONE CAP TID 90 capsule 3    diazePAM (VALIUM) 2 MG tablet       ondansetron (ZOFRAN) 4 MG tablet       potassium chloride (KLOR-CON M) 20 MEQ extended release tablet       ENULOSE 10 GM/15ML SOLN solution       ipratropium-albuterol (DUONEB) 0.5-2.5 (3) MG/3ML SOLN nebulizer solution       fluticasone (FLONASE) 50 MCG/ACT nasal spray       neomycin-polymyxin-hydrocortisone (CORTISPORIN) 3.5-76766-2 otic solution       Multiple Vitamins-Minerals (MULTIVITAMIN PO) Take by mouth daily      sennosides-docusate sodium (SENOKOT-S) 8.6-50 MG tablet Take 1 tablet by mouth 2 times daily      ibuprofen (ADVIL;MOTRIN) 400 MG tablet Take 400 mg by mouth every 6 hours as needed for Pain      acetaminophen (ACETAMINOPHEN 8 HOUR) 650 MG extended release tablet Take 650 mg by mouth every 8 hours as needed for Pain      bisacodyl (DULCOLAX) 10 MG suppository Place 10 mg rectally daily      ferrous sulfate 325 (65 Fe) MG tablet Take 325 mg by mouth daily (with breakfast)      Magnesium Hydroxide (MILK OF MAGNESIA PO) Take 30 mLs by mouth as needed      ondansetron (ZOFRAN-ODT) 4 MG disintegrating tablet Take 1 tablet by mouth every 6 hours as needed for Nausea (Patient not taking: Reported on 6/1/2020)      levothyroxine (SYNTHROID) 50 MCG tablet Take 1 tablet by mouth Daily (Patient not taking: Reported on 6/1/2020) 30 tablet 3     No current facility-administered medications on file prior to visit. Allergies: Ju Gotti is allergic to ativan [lorazepam]; benadryl [diphenhydramine]; and chocolate. 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    Arthritis     Asthma     Encounter for medication monitoring 11/14/2019    Exacerbation of multiple sclerosis ([address] 2018    Family history of breast cancer 3/7/2020    OUMOU virus antibody positive 3/7/2020    Lower paraplegia ([address] 11/14/2019    Marijuana smoker, continuous 11/5/2019    MS (multiple sclerosis) ([address]     Neuromuscular disorder ([address]     Primary chronic progressive multiple sclerosis ([address] 11/14/2019    Rash in adult 11/5/2019       No past surgical history on file.   Social History: Ju Gotti  reports that she has suggest recent plaque activity. No comparison exams to assess for stability or interval increase of patient's underlying demyelinating process.         MRI Cervical Spine (11/6/19):  Extensive white matter lesions noted in the brainstem and cervical spinal  cord, compatible with demyelinating disease.  No areas of active  demyelination.       Hepatitis B surface antigen 2/19/2020: Nonreactive. Hepatitis B surface antibody 2/19/2020: Reactive  Hep Be antibody 2/19/2020: Negative. OUMOU virus antibody titer 2/24/20:   JCV antibody: Positive.,  Index value: 3.74     Varicella zoster Ab, Ig G 2/19/20: Immune    MRI brain, done at [facility] AT [place] [address] [address] [facility] (9/11/2020): Very numerous demyelinating plaques identified within cerebral hemispheres bilaterally, cerebellum bilaterally and within the brainstem. There is progressive involvement of a plaque within the upper midline medulla. There are 2 possibly 3 new enhancing demyelinating plaques identified. The largest is a curvilinear juxtacortical plaque in the inferior lateral left frontal lobe. There is continued atrophy/thinning of the corpus callosum with demyelinating plaques within corpus callosum. MRI cervical spine, done at [facility] AT [place] [address] [address] [facility] (9/11/2020): Very numerous nonenhancing demyelinating plaques again identified within the cervical spinal cord. No active enhancing spinal cord plaques identified. MRI thoracic spine,  done at [facility] AT [address] (9/11/2020):  Very numerous nonenhancing demyelinating plaques again identified within thoracic spinal cord. No active enhancing spinal cord plaques identified            Impression and Plan: Ms. Tracy Parra is a 26 y.o. female with   Primary Progressive multiple Sclerosis diagnosed in 2011; multiple hosp for IV steroid rx; last hosp on 9/11/13 at [facility] as above.   Waiting for clearance from oncologist, Dr. Han's office for IV Ocrevus infusion; mom states \"I will make sure that  patient keeps follow-up with Dr. Arturo Alves"  and bring her back to the office for follow-up visit accordingly. Hx of multiple hosp (11/4/19 & Feb 2020 & March 2020& Sept2020 ) for right-sided weakness; right-sided spasticity; s/p Pulse steroid therapy  Chronic paraplegia  OUMOU Virus +ve with Ab titre 3.74; Recent Brain MRI (11/5/19)  without any changes suggesting PML; to cont monitoring  Vitamin D deficiency: was on Vitamin D supplements    Reactive hepatitis B: will get office visit note from ID regarding initial consultation note in Feb or March; patient is not sure exactly. Thyroid disease; likely hypopituitarism   Spasticity and dysesthesias     Will continue Baclofen and Gabapentin for spasticity and dysthesias  Also to continue vitamin D supplements and thyroid supplements. Continue PT/ OT. Disease modifying therapy:  Discussion done in the past regarding various disease modifying therapies for progressive multiple sclerosis; these include Siponimod, ocrelizumab, rituximab, mitoxantrone, cyclophosphamide & T etc. etc.  She preferred IV eculizumab therapy. She will be initiated on IV Ocrevus infusion once clearance obtained. Follow-up in neurology clinic in 6-8 weeks.       Please note that portions of this note were completed with a voice recognition program.  Although every effort was made to ensure the accuracy of this automated transcription, some errors in transcription may have occurred; occasionally words are mis-transcribed. Thank you for understanding.

## 2020-09-24 NOTE — PROGRESS NOTES
NEUROLOGY FOLLOW-UP  Patient Name:  Chase Chávez  :   1990  Clinic Visit Date: 2020        REVIEW OF SYSTEMS  Constitutional Weight changes: absent, Fevers : absent, Fatigue: present; Any recent hospitalizations:  present.    HEENT Ears: normal, Eyes: no correction, Visual disturbance: absent   Reespiratory Shortness of breath: absent, Cough: absent   Cardivascular Chest pain: absent, Leg swelling :absent   GI Constipation: absent, Diarrhea: absent, Swallowing change: absent    Urinary frequency: absent, Urinary urgency: absent, Urinary incontinence: absent   Musculoskeletal Neck pain: present, Back pain: present, Stiffness: absent, Muscle pain: present, Joint pain: present   Dermatological Hair loss: absent, Skin changes: absent   Neurological Memory loss: absent, Confusion: absent, Seizures: absent; Trouble walking or imbalance: absent, Dizziness: present, Weakness: present, Numbness present, Tremor: absent, Spasm: present, Speech difficulty: absent, Headache: absent, Light sensitivity: present   Psychiatric Anxiety: absent, Depression  absent, Suicidal ideations absent   Hematologic Abnormal bleeding: absent, Anemia: absent, Clotting disorder: absent, Lymph gland changes: absent

## 2020-09-29 ENCOUNTER — HOSPITAL ENCOUNTER (OUTPATIENT)
Facility: MEDICAL CENTER | Age: 30
End: 2020-09-29
Payer: MEDICAID

## 2020-10-05 ENCOUNTER — TELEPHONE (OUTPATIENT)
Dept: ONCOLOGY | Age: 30
End: 2020-10-05

## 2020-10-05 ENCOUNTER — OFFICE VISIT (OUTPATIENT)
Dept: ONCOLOGY | Age: 30
End: 2020-10-05
Payer: MEDICAID

## 2020-10-05 VITALS — TEMPERATURE: 97.4 F | HEART RATE: 113 BPM | DIASTOLIC BLOOD PRESSURE: 54 MMHG | SYSTOLIC BLOOD PRESSURE: 93 MMHG

## 2020-10-05 PROCEDURE — 99214 OFFICE O/P EST MOD 30 MIN: CPT | Performed by: INTERNAL MEDICINE

## 2020-10-05 PROCEDURE — 99212 OFFICE O/P EST SF 10 MIN: CPT

## 2020-10-05 PROCEDURE — 99211 OFF/OP EST MAY X REQ PHY/QHP: CPT | Performed by: INTERNAL MEDICINE

## 2020-10-05 NOTE — PROGRESS NOTES
DIAGNOSIS:   1. Multiple sclerosis   2. +ve JCV   3. Breast mass, left  4. Strong family hx of breast cancer     CURRENT THERAPY:  Considering ocrelizumab for MS     BRIEF CASE HISTORY:   Genoveva Bustillos is a very pleasant 26 y.o. female who is referred to us for exam and mammogram prior to starting treatment for MS. She was recently diagnosed with primary progressive MS. And the plan was to treat her with  Ocrelizumab. She has significant family history of 2 great grandmothers and grandmother with breast cancer. She has previous smoking history, quit in 2005. INTERIM HISTORY: the patient presents today for follow up for breast mass. She reports there has been no change in the mass. She is well with no new changes or complaints. She has appointment with new PCP this week. PAST MEDICAL HISTORY: has a past medical history of Acquired central hypothyroidism, Arthritis, Asthma, Encounter for medication monitoring, Exacerbation of multiple sclerosis ([address] Family history of breast cancer, OUMOU virus antibody positive, Lower paraplegia ([address] Marijuana smoker, continuous, MS (multiple sclerosis) ([address] Neuromuscular disorder ([address] Primary chronic progressive multiple sclerosis ([address] and Rash in adult. PAST SURGICAL HISTORY: has no past surgical history on file. CURRENT MEDICATIONS:  has a current medication list which includes the following prescription(s): gabapentin, baclofen, vitamin d3, acetaminophen, ondansetron, potassium chloride, ipratropium-albuterol, fluticasone, neomycin-polymyxin-hydrocortisone, multiple vitamin, sennosides-docusate sodium, ibuprofen, bisacodyl, ferrous sulfate, magnesium hydroxide, ondansetron, and levothyroxine. ALLERGIES:  is allergic to ativan [lorazepam]; benadryl [diphenhydramine]; and chocolate. FAMILY HISTORY: Paternal grandmother: breast cancer in her 62s; Great grandmothers: breast cancer     SOCIAL HISTORY:  reports that she has never smoked.  She has never used smokeless tobacco. She reports current drug use. Drug: Marijuana. She reports that she does not drink alcohol. REVIEW OF SYSTEMS:   General: No fever or night sweats. Weight is stable. ENT: No double or blurred vision, no tinnitus or hearing problem, no dysphagia or sore throat   Respiratory: No chest pain, no shortness of breath, no cough or hemoptysis. Cardiovascular: Denies chest pain, PND or orthopnea. No L E swelling or palpitations. Gastrointestinal: No nausea or vomiting, abdominal pain, diarrhea or constipation. Genitourinary: Denies dysuria, hematuria, frequency, urgency or incontinence. Neurological: advanced neurological disease. Hypertonia and decreased mobility. Musculoskeletal: No arthralgia no back pain or joint swelling. Skin: multiple [place] [employer] spots, subcutaneous soft masses ? lipomas. Psychiatric:  No anxiety, no depression. Endocrine: No diabetes or thyroid disease. Hematologic: No bleeding, no adenopathy. PHYSICAL EXAM: Shows a well appearing 26y.o.-year-old female who is not in pain or distress. Vital Signs: Blood pressure (!) 93/54, pulse 113, temperature 97.4 °F (36.3 °C), temperature source Oral. HEENT: Normocephalic and atraumatic. Pupils are equal, round, reactive to light and accommodation. Extraocular muscles are intact. Neck: Showed no JVD, no carotid bruit . Lungs: Clear to auscultation bilaterally. Heart: Regular without any murmur. Abdomen: Soft, nontender. No hepatosplenomegaly. Extremities: Lower extremities show no edema, clubbing, or cyanosis. Breasts: Left: nodular mass in the 9:00 position - soft measuring about 1\" and connected to the skin, dense, fibronodular tissue- no change over previous Right: dense, fibronodular tissues, no specific mass, Neuro exam: signs of advanced upper motor neuron disease. With hypertonia and weakness. She is not spastic. And able to control her movement but she is very weak.   Lymphatic: no adenopathy appreciated in the supraclavicular, axillary, cervical or inguinal area  Skin: multiple cafe au lait spots on the trunk, multiple soft subcutaneous lesions on the back    REVIEW OF LABORATORY DATA:   Lab Results   Component Value Date    WBC 6.3 01/28/2020    HGB 12.6 01/28/2020    HCT 39.7 01/28/2020    MCV 93.9 01/28/2020     01/28/2020       Chemistry        Component Value Date/Time     01/28/2020 0625    K 4.2 01/28/2020 0625     01/28/2020 0625    CO2 25 01/28/2020 0625    BUN 12 01/28/2020 0625    CREATININE 0.48 (L) 01/28/2020 0625        Component Value Date/Time    CALCIUM 9.4 01/28/2020 0625            REVIEW OF RADIOLOGICAL RESULTS:       IMPRESSION:   1. Multiple sclerosis   2. Breast mass - benign   3. strong family hx of breast cancer. PLAN:   1. Breast exam shows no change in the mass. 2. She may proceed with MS treatment and will communicate with nuerology. 3. I recommend mammogram to be done in 1 year and will put in orders. 4. Return in 1 year.

## 2020-10-05 NOTE — TELEPHONE ENCOUNTER
Henry Mcmanus MD VISIT  DR Ilir Singh IN TO SEE PATIENT  ORDERS RECEIVED  NEED MAMMOGRAM IN June 2021  RV AFTER MAMMOGRAM  MAMMOGRAM SCHEDULED W/  MD VISIT 06/18/21 @2PM  AVS PRINTED AND GIVEN TO PATIENT WITH INSTRUCTIONS  PATIENT DISCHARGED VIA WHEELCHAIR

## 2020-10-08 ENCOUNTER — TELEPHONE (OUTPATIENT)
Dept: NEUROLOGY | Age: 30
End: 2020-10-08

## 2020-10-08 NOTE — TELEPHONE ENCOUNTER
Den  Please let the patient know that I am working in the hospital this week. When I get to office next week, I will work on it.      Please let the patient know that   Thank you so much.   -dr. Silviano Charles

## 2020-10-16 NOTE — PROGRESS NOTES
Constitutional [] Weight loss/gain   [] Fatigue  [] Fever/Chills   HEENT [] Hearing Loss  [] Visual Disturbance  [] Tinnitus  [] Eye pain   Respiratory [] Shortness of Breath  [] Cough  [] Snoring   Cardiovascular [] Chest Pain  [] Palpitations  [] Lightheaded   GI [] Constipation  [] Diarrhea  [] Swallowing change  [] Nausea/vomiting    [] Urinary Frequency  [] Urinary Urgency   Musculoskeletal [] Neck pain  [] Back pain  [] Muscle pain  [] Restless legs   Dermatologic [] Skin changes   Neurologic [] Memory loss/confusion  [] Seizures  [] Trouble walking or imbalance  [] Dizziness  [] Sleep disturbance  [] Weakness  [] Numbness  [] Tremors  [] Speech Difficulty  [] Headaches  [] Light Sensitivity  [] Sound Sensitivity   Endocrinology []Excessive thirst  []Excessive hunger   Psychiatric [] Anxiety/Depression  [] Hallucination   Allergy/immunology []Hives/environmental allergies   Hematologic/lymph [] Abnormal bleeding  [] Abnormal bruising

## 2020-10-22 ENCOUNTER — TELEMEDICINE (OUTPATIENT)
Dept: NEUROLOGY | Age: 30
End: 2020-10-22
Payer: MEDICAID

## 2020-10-22 PROCEDURE — 99214 OFFICE O/P EST MOD 30 MIN: CPT | Performed by: PSYCHIATRY & NEUROLOGY

## 2020-11-19 ENCOUNTER — TELEPHONE (OUTPATIENT)
Dept: NEUROLOGY | Age: 30
End: 2020-11-19

## 2020-12-16 ENCOUNTER — OFFICE VISIT (OUTPATIENT)
Dept: INFECTIOUS DISEASES | Age: 30
End: 2020-12-16
Payer: MEDICAID

## 2020-12-16 VITALS
RESPIRATION RATE: 18 BRPM | HEIGHT: 70 IN | DIASTOLIC BLOOD PRESSURE: 63 MMHG | BODY MASS INDEX: 15.32 KG/M2 | TEMPERATURE: 97.3 F | WEIGHT: 107 LBS | SYSTOLIC BLOOD PRESSURE: 98 MMHG | HEART RATE: 91 BPM | OXYGEN SATURATION: 99 %

## 2020-12-16 PROCEDURE — 99204 OFFICE O/P NEW MOD 45 MIN: CPT | Performed by: INTERNAL MEDICINE

## 2020-12-16 RX ORDER — ACYCLOVIR 400 MG/1
400 TABLET ORAL 2 TIMES DAILY
Qty: 60 TABLET | Refills: 11 | Status: SHIPPED | OUTPATIENT
Start: 2020-12-16 | End: 2021-01-15

## 2020-12-16 ASSESSMENT — ENCOUNTER SYMPTOMS
COLOR CHANGE: 0
EYE DISCHARGE: 0
CHEST TIGHTNESS: 0
ABDOMINAL DISTENTION: 0

## 2020-12-16 NOTE — PROGRESS NOTES
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 Immunosuppressed due to chemotherapy  acyclovir (ZOVIRAX) 400 MG tablet    Quantiferon (R) TB Gold, (Incubated)    Hepatitis B Surface Antibody    Hepatitis B Core Antibody, Total    Hepatitis B Surface Antigen       Return in about 4 weeks (around 1/13/2021). History of Present Illness:   Samaria Bains is a 26y.o.-year-old  female who presents with   Chief Complaint   Patient presents with    New Patient     wanting to be placed into a regular chair to be cleared?  Results     labs done    new pt 12/16/20  MS w right arm and bilat LE paralysis, good mentation, wheelchair, presents for vioral clearance for ocrelizumab after failing other regimens. Tested + for the JCV AB - post hep B vaccine 2015. No other known immunosuppressive diseases. ocrelizumab anti CD20- can increase the reactivation of Herpes V and lead to breast CA and reactivate OUMOU V as encephalitis . No comments on TB reactivation or PCP infection. JCV CNS infection seems rare but related to severely suppressed CD4 counts.     Pt tested + JCV AB + and will be at risk for this as well as at risk for breast CA due to her family hx. I can add acyclovir to control relapses of the HSV1 that usually causes her lip ulcers, it will help control varicella from relapsing as well  she had the Hep B vaccine and should be safe from hep B reactivation  Will need quantiferon TB gold as well if this medication is planned    I think it is her choice to proceed with this med considering the risks with it or without is. See me in 4 weeks. I have personally reviewed the past medical history, past surgical history, medications, social history, and family history, and I haveupdated the database accordingly. 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    Arthritis     Asthma     Encounter for medication monitoring 11/14/2019    Exacerbation of multiple sclerosis [address] 2018    Family history of breast cancer 3/7/2020    OUMOU virus antibody positive 3/7/2020    Lower paraplegia ([facility] [address] 11/14/2019    Marijuana smoker, continuous 11/5/2019    MS (multiple sclerosis) ([facility]     Neuromuscular disorder ([facility] [facility]     Primary chronic progressive multiple sclerosis ([facility] [address] 11/14/2019    Rash in adult 11/5/2019       Past Surgical  History:   History reviewed. No pertinent surgical history.     Medications:     Current Outpatient Medications:     acyclovir (ZOVIRAX) 400 MG tablet, Take 1 tablet by mouth 2 times daily, Disp: 60 tablet, Rfl: 11    gabapentin (NEURONTIN) 100 MG capsule, TAKE ONE CAP TID, Disp: 90 capsule, Rfl: 3    baclofen (LIORESAL) 10 MG tablet, Take 1 tablet by mouth 3 times daily, Disp: 90 tablet, Rfl: 3    vitamin D3 (CHOLECALCIFEROL) 25 MCG (1000 UT) TABS tablet, Once daily, Disp: 30 tablet, Rfl: 3    ipratropium-albuterol (DUONEB) 0.5-2.5 (3) MG/3ML SOLN nebulizer solution, , Disp: , Rfl:     neomycin-polymyxin-hydrocortisone (CORTISPORIN) 3.5-01353-0 otic solution, , Disp: , Rfl:     Multiple Vitamins-Minerals (MULTIVITAMIN PO), Take by mouth daily, Disp: , Rfl:     ibuprofen (ADVIL;MOTRIN) 400 MG tablet, Take 400 mg by mouth every 6 hours as needed for Pain, Disp: , Rfl:     acetaminophen (ACETAMINOPHEN 8 HOUR) 650 MG extended release tablet, Take 650 mg by mouth every 8 hours as needed for Pain, Disp: , Rfl:     bisacodyl (DULCOLAX) 10 MG suppository, Place 10 mg rectally daily, Disp: , Rfl:     ferrous sulfate 325 (65 Fe) MG tablet, Take 325 mg by mouth daily (with breakfast), Disp: , Rfl:     Magnesium Hydroxide (MILK OF MAGNESIA PO), Take 30 mLs by mouth as needed, Disp: , Rfl:     ondansetron (ZOFRAN) 4 MG tablet, , Disp: , Rfl:     potassium chloride (KLOR-CON M) 20 MEQ extended release tablet, , Disp: , Rfl:     fluticasone (FLONASE) 50 MCG/ACT nasal spray, , Disp: , Rfl:     sennosides-docusate sodium (SENOKOT-S) 8.6-50 MG tablet, Take 1 tablet by mouth 2 times daily, Disp: , Rfl:     ondansetron (ZOFRAN-ODT) 4 MG disintegrating tablet, Take 1 tablet by mouth every 6 hours as needed for Nausea (Patient not taking: Reported on 12/16/2020), Disp: , Rfl:     levothyroxine (SYNTHROID) 50 MCG tablet, Take 1 tablet by mouth Daily (Patient not taking: Reported on 12/16/2020), Disp: 30 tablet, Rfl: 3      Social History:     Social History     Socioeconomic History    Marital status: Single     Spouse name: Not on file    Number of children: Not on file    Years of education: Not on file    Highest education level: Not on file   Occupational History    Not on file   Social Needs    Financial resource strain: Not on file    Food insecurity     Worry: Not on file     Inability: Not on file   [employer] needs     Medical: Not on file     Non-medical: Not on file   Tobacco Use    Smoking status: Never Smoker    Smokeless tobacco: Never Used   Substance and Sexual Activity    Alcohol use: No    Drug use: Yes     Types: Marijuana    Sexual activity: Yes     Birth control/protection: Other-see comments     Comment: does not use birth control d/t intercourse is infrequent   Lifestyle    Physical activity     Days per week: Not on file     Minutes per session: Not on file    Stress: Not on file   Relationships    Social connections     Talks on phone: Not on file     Gets together: Not on file     Attends Yazdanism service: Not on file     Active member of club or organization: Not on file     Attends meetings of clubs or organizations: Not on file     Relationship status: Not on file    Intimate partner violence     Fear of current or ex partner: Not on file     Emotionally abused: Not on file     Physically abused: Not on file     Forced sexual activity: Not on file   Other Topics Concern    Not on file   Social History Narrative    Not on file       Family History:     Family History   Problem Relation Age of Onset    No Known Problems Mother     Stroke Father     No Known Problems Sister     No Known Problems Brother         Allergies:   Ativan [lorazepam], Benadryl [diphenhydramine], and Chocolate     Review of Systems:   Review of Systems   Constitutional: Negative for activity change and appetite change. HENT: Negative for congestion. Eyes: Negative for discharge. Respiratory: Negative for chest tightness. Cardiovascular: Negative for chest pain. Gastrointestinal: Negative for abdominal distention. Endocrine: Negative for cold intolerance. Genitourinary: Negative for dysuria and urgency. Musculoskeletal: Negative for arthralgias. Skin: Negative for color change. Allergic/Immunologic: Negative for immunocompromised state. Neurological: Positive for weakness. Negative for dizziness. Hematological: Negative for adenopathy. Psychiatric/Behavioral: Negative for agitation. Physical Examination :   Blood pressure 98/63, pulse 91, temperature 97.3 °F (36.3 °C), temperature source Temporal, resp.  rate 18, height 5' 10\" (1.778 m), weight 107 lb (48.5 kg), SpO2 99 %.    Physical Exam  Constitutional:       Appearance: Normal appearance. HENT:      Head: Normocephalic and atraumatic. Mouth/Throat:      Mouth: Mucous membranes are moist.   Eyes:      General: No scleral icterus. Conjunctiva/sclera: Conjunctivae normal.      Pupils: Pupils are equal, round, and reactive to light. Neck:      Musculoskeletal: Neck supple. No neck rigidity. Cardiovascular:      Rate and Rhythm: Normal rate and regular rhythm. Heart sounds: Normal heart sounds. No murmur. Pulmonary:      Effort: No respiratory distress. Breath sounds: Normal breath sounds. Abdominal:      General: There is no distension. Tenderness: There is no abdominal tenderness. Genitourinary:     Comments: No domínguez  Musculoskeletal:         General: No swelling or deformity. Comments: paralysed R arm and both legs   Skin:     General: Skin is dry. Coloration: Skin is not jaundiced. Neurological:      Mental Status: She is alert and oriented to person, place, and time. Cranial Nerves: No cranial nerve deficit. Psychiatric:         Mood and Affect: Mood normal.         Thought Content:  Thought content normal.           Medical Decision Making:   I have independently reviewed/ordered the following labs:    CBCwith Differential:   Lab Results   Component Value Date    WBC 6.3 01/28/2020    WBC 10.5 11/08/2019    HGB 12.6 01/28/2020    HGB 10.8 11/08/2019    HCT 39.7 01/28/2020    HCT 32.7 11/08/2019     01/28/2020     11/08/2019    LYMPHOPCT 6 11/08/2019    LYMPHOPCT 3 11/07/2019    MONOPCT 9 11/08/2019    MONOPCT 4 11/07/2019     BMP:  Lab Results   Component Value Date     01/28/2020     11/08/2019    K 4.2 01/28/2020    K 3.8 11/08/2019     01/28/2020     11/08/2019    CO2 25 01/28/2020    CO2 24 11/08/2019    BUN 12 01/28/2020    BUN 9 11/08/2019    CREATININE 0.48 01/28/2020    CREATININE 0.49 11/08/2019    MG 2.0 10/16/2018 Hepatic Function Panel:   Lab Results   Component Value Date    LABALBU 3.8 11/05/2019     No results found for: RPR  No results found for: HIV  No results found for: [facility]  Lab Results   Component Value Date    RBC 4.23 01/28/2020    WBC 6.3 01/28/2020     Lab Results   Component Value Date    CREATININE 0.48 01/28/2020    GLUCOSE 72 01/28/2020   you for allowing us to participate in the care of this patient. Please call with questions. Treasure Clark MD  - Office: [phone]    Please note that this chart was generated using voice recognition Dragon dictation software. Although every effort was made to ensure the accuracy of this automated transcription, some errors in transcription mayhave occurred.

## 2021-01-11 ENCOUNTER — TELEPHONE (OUTPATIENT)
Dept: INFECTIOUS DISEASES | Age: 31
End: 2021-01-11

## 2021-01-11 NOTE — TELEPHONE ENCOUNTER
Spoke with nursing facility again to send lab results they told me this time she wasn't a patient I then called patient and asked her if she had labs drawn she said she didn't know how to get them done I explained to her how to go to any [facility] facilty and they would draw what was ordered. She then said I need a ride to go.  She sounded very confused I told wait for her vv vist on Wednesday and we would figure out about the labs

## 2021-01-13 ENCOUNTER — VIRTUAL VISIT (OUTPATIENT)
Dept: INFECTIOUS DISEASES | Age: 31
End: 2021-01-13
Payer: MEDICAID

## 2021-01-13 DIAGNOSIS — D84.821 IMMUNOSUPPRESSED DUE TO CHEMOTHERAPY (HCC): Primary | ICD-10-CM

## 2021-01-13 DIAGNOSIS — Z79.899 IMMUNOSUPPRESSED DUE TO CHEMOTHERAPY (HCC): Primary | ICD-10-CM

## 2021-01-13 DIAGNOSIS — T45.1X5A IMMUNOSUPPRESSED DUE TO CHEMOTHERAPY (HCC): Primary | ICD-10-CM

## 2021-01-13 DIAGNOSIS — B00.9 RECURRENT HERPES SIMPLEX: ICD-10-CM

## 2021-01-13 PROCEDURE — 99213 OFFICE O/P EST LOW 20 MIN: CPT | Performed by: INTERNAL MEDICINE

## 2021-01-13 ASSESSMENT — ENCOUNTER SYMPTOMS
EYE DISCHARGE: 0
CHEST TIGHTNESS: 0
COLOR CHANGE: 0
ABDOMINAL DISTENTION: 0

## 2021-01-13 NOTE — PROGRESS NOTES
Brittany Gamboa is a 26 y.o. female being evaluated by a Virtual Visit (video visit) encounter to address concerns as mentioned above. A caregiver was present when appropriate. Due to this being a TeleHealth encounter (During HFMZL-03 public health emergency), evaluation of the following organ systems was limited: Vitals/Constitutional/EENT/Resp/CV/GI//MS/Neuro/Skin/Heme-Lymph-Imm. Pursuant to the emergency declaration under the [address] [place] [address] and the [employer] Act, this Virtual Visit was conducted with patient's (and/or legal guardian's) consent, to reduce the patient's risk of exposure to COVID-19 and provide necessary medical care. The patient (and/or legal guardian) has also been advised to contact this office for worsening conditions or problems, and seek emergency medical treatment and/or call 911 if deemed necessary. Services were provided through a video synchronous discussion virtually to substitute for in-person clinic visit. Patient and provider were located at their individual homes.     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 Will ask her to get the blood work while she comes for the neuro visit -  See me in 4 months    Disc w DR Xiao over the phone     Diagnosis Orders   1. Immunosuppressed due to chemotherapy     2. Recurrent herpes simplex         Return in about 4 months (around 5/13/2021). History of Present Illness:   Arabella Manzano is a 26y.o.-year-old  female who presents with   Chief Complaint   Patient presents with    Follow-up     Immunosuppressed due to chemotherapy   new pt 12/16/20  MS w right arm and bilat LE paralysis, good mentation, wheelchair, presents for viral clearance for ocrelizumab after failing other regimens. Tested + for the JCV AB - post hep B vaccine 2015. No other known immunosuppressive diseases. ocrelizumab anti CD20- can increase the reactivation of Herpes V and lead to breast CA and reactivate OUMOU V as encephalitis . No comments on TB reactivation or PCP infection. JCV CNS infection seems rare but related to severely suppressed CD4 counts. Pt tested + JCV AB + and will be at risk for this as well as at risk for breast CA due to her family hx. I can add acyclovir to control relapses of the HSV1 that usually causes her lip ulcers, it will help control varicella from relapsing as well  she had the Hep B vaccine and should be safe from hep B reactivation  Will need quantiferon TB gold as well if this medication is planned    I think it is her choice to proceed with this med considering the risks with it or without is. See me in 4 weeks.     1./11/2021  RN:   Noe Hearing with nursing facility again to send lab results they told me this time she wasn't a patient I then called patient and asked her if she had labs drawn she said she didn't know how to get them done I explained to her how to go to any [facility] facilty and they would draw what was ordered. She then said I need a ride to go. She sounded very confused I told wait for her vv vist on Wednesday and we would figure out about the labs    Virtual visit 1/12/21  Has not started the Ocrevis and did not see neuro since our last visit. Was not able to make his apointt, was not ready in her chair for that day. Did not do the blood test yet. She will call neuro for apointment. Feeling good, no nausea, vomiting, diarrhea no fever  Defer to neuro to disc w pt and decide on the Ocrevis. Will ask her to get the blood work while she comes for the neuro visit -    I have personally reviewed the past medical history, past surgical history, medications, social history, and family history, and I haveupdated the database accordingly. 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    Arthritis     Asthma     Encounter for medication monitoring 11/14/2019    Exacerbation of multiple sclerosis ([facility] 2018    Family history of breast cancer 3/7/2020    OUMOU virus antibody positive 3/7/2020    Lower paraplegia ([facility] [facility] 11/14/2019    Marijuana smoker, continuous 11/5/2019    MS (multiple sclerosis) ([facility] [facility]     Neuromuscular disorder ([facility] [facility]     Primary chronic progressive multiple sclerosis ([facility] [address] [facility] 11/14/2019    Rash in adult 11/5/2019       Past Surgical  History:   History reviewed. No pertinent surgical history.     Medications:     Current Outpatient Medications:     acyclovir (ZOVIRAX) 400 MG tablet, Take 1 tablet by mouth 2 times daily, Disp: 60 tablet, Rfl: 11    gabapentin (NEURONTIN) 100 MG capsule, TAKE ONE CAP TID, Disp: 90 capsule, Rfl: 3    baclofen (LIORESAL) 10 MG tablet, Take 1 tablet by mouth 3 times daily, Disp: 90 tablet, Rfl: 3    vitamin D3 (CHOLECALCIFEROL) 25 MCG (1000 UT) TABS tablet, Once daily, Disp: 30 tablet, Rfl: 3    ondansetron (ZOFRAN) 4 MG tablet, , Disp: , Rfl:     potassium chloride (KLOR-CON M) 20 MEQ extended release tablet, , Disp: , Rfl:     ipratropium-albuterol (DUONEB) 0.5-2.5 (3) MG/3ML SOLN nebulizer solution, , Disp: , Rfl:     fluticasone (FLONASE) 50 MCG/ACT nasal spray, , Disp: , Rfl:     neomycin-polymyxin-hydrocortisone (CORTISPORIN) 3.5-88850-0 otic solution, , Disp: , Rfl:     Multiple Vitamins-Minerals (MULTIVITAMIN PO), Take by mouth daily, Disp: , Rfl:     sennosides-docusate sodium (SENOKOT-S) 8.6-50 MG tablet, Take 1 tablet by mouth 2 times daily, Disp: , Rfl:     ibuprofen (ADVIL;MOTRIN) 400 MG tablet, Take 400 mg by mouth every 6 hours as needed for Pain, Disp: , Rfl:     acetaminophen (ACETAMINOPHEN 8 HOUR) 650 MG extended release tablet, Take 650 mg by mouth every 8 hours as needed for Pain, Disp: , Rfl:     bisacodyl (DULCOLAX) 10 MG suppository, Place 10 mg rectally daily, Disp: , Rfl:     ferrous sulfate 325 (65 Fe) MG tablet, Take 325 mg by mouth daily (with breakfast), Disp: , Rfl:     Magnesium Hydroxide (MILK OF MAGNESIA PO), Take 30 mLs by mouth as needed, Disp: , Rfl:     ondansetron (ZOFRAN-ODT) 4 MG disintegrating tablet, Take 1 tablet by mouth every 6 hours as needed for Nausea, Disp: , Rfl:     levothyroxine (SYNTHROID) 50 MCG tablet, Take 1 tablet by mouth Daily, Disp: 30 tablet, Rfl: 3      Social History:     Social History     Socioeconomic History    Marital status: Single     Spouse name: Not on file    Number of children: Not on file    Years of education: Not on file    Highest education level: Not on file   Occupational History    Not on file   Social Needs    Financial resource strain: Not on file    Food insecurity     Worry: Not on file     Inability: Not on file   [employer] needs     Medical: Not on file     Non-medical: Not on file   Tobacco Use    Smoking status: Never Smoker    Smokeless tobacco: Never Used   Substance and Sexual Activity    Alcohol use: No    Drug use: Yes     Types: Marijuana    Sexual activity: Yes     Birth control/protection: Other-see comments     Comment: does not use birth control d/t intercourse is infrequent   Lifestyle    Physical activity     Days per week: Not on file     Minutes per session: Not on file    Stress: Not on file   Relationships    Social connections     Talks on phone: Not on file     Gets together: Not on file     Attends Mandaeism service: Not on file     Active member of club or organization: Not on file     Attends meetings of clubs or organizations: Not on file     Relationship status: Not on file    Intimate partner violence     Fear of current or ex partner: Not on file     Emotionally abused: Not on file     Physically abused: Not on file     Forced sexual activity: Not on file   Other Topics Concern    Not on file   Social History Narrative    Not on file       Family History:     Family History   Problem Relation Age of Onset    No Known Problems Mother     Stroke Father     No Known Problems Sister     No Known Problems Brother         Allergies:   Ativan [lorazepam], Benadryl [diphenhydramine], and Chocolate     Review of Systems:   Review of Systems   Constitutional: Negative for activity change and appetite change. HENT: Negative for congestion. Eyes: Negative for discharge. Respiratory: Negative for chest tightness. Cardiovascular: Negative for chest pain. Gastrointestinal: Negative for abdominal distention. Endocrine: Negative for cold intolerance. Genitourinary: Negative for dysuria and urgency. Musculoskeletal: Negative for arthralgias. Skin: Negative for color change. Allergic/Immunologic: Negative for immunocompromised state. Neurological: Positive for weakness. Negative for dizziness. Hematological: Negative for adenopathy. Psychiatric/Behavioral: Negative for agitation. Physical Examination :   There were no vitals taken for this visit. Physical Exam  Constitutional:       Appearance: Normal appearance.    HENT:      Head: Normocephalic and atraumatic. Mouth/Throat:      Mouth: Mucous membranes are moist.   Eyes:      General: No scleral icterus. Conjunctiva/sclera: Conjunctivae normal.      Pupils: Pupils are equal, round, and reactive to light. Neck:      Musculoskeletal: Neck supple. No neck rigidity. Cardiovascular:      Rate and Rhythm: Normal rate and regular rhythm. Heart sounds: Normal heart sounds. No murmur. Pulmonary:      Effort: No respiratory distress. Breath sounds: Normal breath sounds. Abdominal:      General: There is no distension. Tenderness: There is no abdominal tenderness. Genitourinary:     Comments: No domínguez  Musculoskeletal:         General: No swelling or deformity. Comments: paralysed R arm and both legs   Skin:     General: Skin is dry. Coloration: Skin is not jaundiced. Neurological:      Mental Status: She is alert and oriented to person, place, and time. Cranial Nerves: No cranial nerve deficit. Psychiatric:         Mood and Affect: Mood normal.         Thought Content:  Thought content normal.           Medical Decision Making:   I have independently reviewed/ordered the following labs:    CBCwith Differential:   Lab Results   Component Value Date    WBC 6.3 01/28/2020    WBC 10.5 11/08/2019    HGB 12.6 01/28/2020    HGB 10.8 11/08/2019    HCT 39.7 01/28/2020    HCT 32.7 11/08/2019     01/28/2020     11/08/2019    LYMPHOPCT 6 11/08/2019    LYMPHOPCT 3 11/07/2019    MONOPCT 9 11/08/2019    MONOPCT 4 11/07/2019     BMP:  Lab Results   Component Value Date     01/28/2020     11/08/2019    K 4.2 01/28/2020    K 3.8 11/08/2019     01/28/2020     11/08/2019    CO2 25 01/28/2020    CO2 24 11/08/2019    BUN 12 01/28/2020    BUN 9 11/08/2019    CREATININE 0.48 01/28/2020    CREATININE 0.49 11/08/2019    MG 2.0 10/16/2018     Hepatic Function Panel:   Lab Results   Component Value Date    LABALBU 3.8 11/05/2019     No results found for: RPR  No results found for: HIV  No results found for: [facility]  Lab Results   Component Value Date    RBC 4.23 01/28/2020    WBC 6.3 01/28/2020     Lab Results   Component Value Date    CREATININE 0.48 01/28/2020    GLUCOSE 72 01/28/2020   you for allowing us to participate in the care of this patient. Please call with questions. Umu Crawford MD  - Office: [phone]    Please note that this chart was generated using voice recognition Dragon dictation software. Although every effort was made to ensure the accuracy of this automated transcription, some errors in transcription mayhave occurred.

## 2021-02-18 ENCOUNTER — TELEPHONE (OUTPATIENT)
Dept: ONCOLOGY | Age: 31
End: 2021-02-18

## 2021-02-18 NOTE — TELEPHONE ENCOUNTER
Fax from [facility] care, inquiring about 6 month ultrasound follow up for pt   Placed in md rack for review

## 2021-03-23 ENCOUNTER — OFFICE VISIT (OUTPATIENT)
Dept: NEUROLOGY | Age: 31
End: 2021-03-23
Payer: MEDICAID

## 2021-03-23 VITALS
BODY MASS INDEX: 15.35 KG/M2 | HEIGHT: 70 IN | SYSTOLIC BLOOD PRESSURE: 108 MMHG | HEART RATE: 68 BPM | DIASTOLIC BLOOD PRESSURE: 72 MMHG | TEMPERATURE: 97.3 F

## 2021-03-23 DIAGNOSIS — R76.8 JC VIRUS ANTIBODY POSITIVE: ICD-10-CM

## 2021-03-23 DIAGNOSIS — G82.20 LOWER PARAPLEGIA (HCC): ICD-10-CM

## 2021-03-23 DIAGNOSIS — G35 PRIMARY CHRONIC PROGRESSIVE MULTIPLE SCLEROSIS (HCC): Primary | ICD-10-CM

## 2021-03-23 DIAGNOSIS — D84.9 IMMUNOSUPPRESSED STATUS (HCC): ICD-10-CM

## 2021-03-23 DIAGNOSIS — Z80.3 FAMILY HISTORY OF BREAST CANCER: ICD-10-CM

## 2021-03-23 PROCEDURE — 99214 OFFICE O/P EST MOD 30 MIN: CPT | Performed by: PSYCHIATRY & NEUROLOGY

## 2021-03-23 NOTE — PROGRESS NOTES
NEUROLOGY FOLLOW-UP  Patient Name:  Carolyn Patient  :   1990  Clinic Visit Date: 3/23/2021  Last visit: 10/22/2020      I saw Ms. Leon Patient in follow-up in the office today in continuation of neurologic care. She is a 33 y.o. right handed female with Primary progressive multiple sclerosis. Today she is brought to the clinic by transportation from her house in [facility]. Her mom, Renelda Cowden is at work today and did not accompany the patient to the clinic. She has not been hosp at [facility] Since 2020 and also patient did not have any symptoms or signs indicating MS exacerbation since the last visit. She comes to the clinic stating \" I assumed that I will be started on Ocrevus infusion today\" stating that \" I had seen ID specialist, Dr. Fannie Castañeda on 2021\". But upon further questioning; patient totally forgot about testing to be done as recommended by ID specialist.  She still very much interested about IV ocrelizumab for primary progressive MS. She had nodular mass in breast and with family history of breast cancer; she had consultation with oncologist, Dr. Ayan Unger. She has had mammogram and she finally got clearance from oncologist.  Patient also stated that her paraplegia and lower extremities continued. Of note; she had chronic bilateral lower extremity paraplegia since  and has been wheelchair dependent since 2017 with underlying primary progressive multiple sclerosis. Then patient has had multiple hospitalizations symptoms suggesting MS progression; requiring IV steroid therapy. Multiple discussions done in the past for the need to be on disease modifying therapy. Discussed and reviewed literature with the patient regarding different options. Patient reviewed the literature and she wanted IV Ocrevus. She has strong family hx of breast cancer in her paternal grandma and sister of paternal grandma.    She has been on vitamin D and gabapentin for pain and cramps along with the painful sensations in bilateral lower extremities. Pins-and-needles sensations occur in bilateral lower extremities. Has not been on any antispasmodics. Previous Neurological Work-up:   CT head 11/4/2019: No acute intracranial abnormality. Patchy white matter disease consistent with history of multiple sclerosis. Mild generalized atrophy for patient's age. MRI brain (w/wo) 11/5/2019: Extensive high signal in brain parenchyma above and below the tentorium. No abnormal enhancement are restricted diffusion signal to suggest recent plaque activity. No comparison exams to assess for stability or interval increase of patient's underlying demyelinating process. Vitamin D 25-hydroxy level 11/5/2019: 7.9  Vitamin B12 level: 461  TSH level 11/5/2019: 0.2 (0.3 -5)  Free T4 level 11/5/2019:  0.76 (0.9-1.7)   MRI Cervical Spine (11/6/19):  Extensive white matter lesions noted in the brainstem and cervical spinal  cord, compatible with demyelinating disease.  No areas of active  demyelination. DISEASE SUMMARY  Date of onset: 2011  Date of diagnosis of MS: 2011  Disease course at onset: ? Relapsing-Remitting; has had multiple hosp \"~ 10 hosp\" for iv steroids as per patient  Current disease course: Progressive multiple sclerosis  Previous disease therapies: Copaxone and Gilenya  Current disease therapy: none  CSF:   JCV serology result and date: OUMOU virus +ve ; index value 3.74 (2/24/20). Vitamin D: 7.9 (11/5/2019)  Smoking status: none  EDSS: 7.5 consisting of paraplegia; dysmetria; sensory impairment  9HPT:  T25W: -N/A-wheelchair-bound     Review of systems done by staff reviewed and pertinent positives include Balance difficulties, dizziness, weakness, numbness, spasticity, neck pain, back pain, stiffness, joint pains and recent hosp as stated above.     Current Outpatient Medications on File Prior to Visit   Medication Sig Dispense Refill    gabapentin (NEURONTIN) 100 MG capsule TAKE ONE CAP TID 90 capsule 3    baclofen (LIORESAL) 10 MG tablet Take 1 tablet by mouth 3 times daily 90 tablet 3    vitamin D3 (CHOLECALCIFEROL) 25 MCG (1000 UT) TABS tablet Once daily 30 tablet 3    ondansetron (ZOFRAN) 4 MG tablet       potassium chloride (KLOR-CON M) 20 MEQ extended release tablet       ipratropium-albuterol (DUONEB) 0.5-2.5 (3) MG/3ML SOLN nebulizer solution       fluticasone (FLONASE) 50 MCG/ACT nasal spray       neomycin-polymyxin-hydrocortisone (CORTISPORIN) 3.5-90896-0 otic solution       Multiple Vitamins-Minerals (MULTIVITAMIN PO) Take by mouth daily      sennosides-docusate sodium (SENOKOT-S) 8.6-50 MG tablet Take 1 tablet by mouth 2 times daily      ibuprofen (ADVIL;MOTRIN) 400 MG tablet Take 400 mg by mouth every 6 hours as needed for Pain      acetaminophen (ACETAMINOPHEN 8 HOUR) 650 MG extended release tablet Take 650 mg by mouth every 8 hours as needed for Pain      bisacodyl (DULCOLAX) 10 MG suppository Place 10 mg rectally daily      ferrous sulfate 325 (65 Fe) MG tablet Take 325 mg by mouth daily (with breakfast)      Magnesium Hydroxide (MILK OF MAGNESIA PO) Take 30 mLs by mouth as needed      ondansetron (ZOFRAN-ODT) 4 MG disintegrating tablet Take 1 tablet by mouth every 6 hours as needed for Nausea      levothyroxine (SYNTHROID) 50 MCG tablet Take 1 tablet by mouth Daily 30 tablet 3     No current facility-administered medications on file prior to visit. Allergies: Marilee Zepeda is allergic to ativan [lorazepam]; benadryl [diphenhydramine]; and chocolate.     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 No pertinent surgical history. Social History: Claudy Driscoll  reports that she has never smoked. She has never used smokeless tobacco. She reports current drug use. Drug: Marijuana. She reports that she does not drink alcohol. Family History   Problem Relation Age of Onset    No Known Problems Mother     Stroke Father     No Known Problems Sister     No Known Problems Brother      On exam:Blood pressure 108/72, pulse 68, temperature 97.3 °F (36.3 °C), temperature source Temporal, height 5' 10\" (1.778 m). NEUROLOGIC EXAMINATION  GENERAL  Appears comfortable and in no distress   HEENT  NC/ AT   NECK  Supple and no bruits heard   MENTAL STATUS:  Alert, oriented, intact memory, no confusion, normal speech, normal language, no hallucination or delusion; appropriate affect   CRANIAL NERVES: II     -      Pupils reactive bilaterally; fundus exam revealed intact venous pulsations; visual fields intact to confrontation  III,IV,VI -  EOMs full, no afferent defect, no                      NIKITA, no ptosis  V     -     diminished light touch and pinprick on right side   VII    -     Normal facial symmetry  VIII   -     Intact hearing  IX,X -     Symmetrical palate  XI    -     Symmetrical shoulder shrug  XII   -     Midline tongue, no atrophy    MOTOR FUNCTION:  significant for weakness of grade 0/5 in bilateral lower extremities and -4/5 in right upper extremity and 5/5 in left upper extremity with atrophy in right upper extremity and spasticity in bilateral lower extremities with no tremors.      SENSORY FUNCTION:  Diminished pinprick on right side; diminished light touch and pinprick and temperature in bilateral lower extremities    CEREBELLAR FUNCTION:  Dysmetria to finger-nose-finger testing in bilateral upper extremities    REFLEX FUNCTION:  Hyperactive DTRs with bilateral ankle clonus and bilateral extensor plantars    STATION and GAIT  wheelchair bound; deferred        MRI brain (w/wo) 11/5/2019: Extensive high signal in brain parenchyma above and below the tentorium. No abnormal enhancement are restricted diffusion signal to suggest recent plaque activity. No comparison exams to assess for stability or interval increase of patient's underlying demyelinating process.         MRI Cervical Spine (11/6/19):  Extensive white matter lesions noted in the brainstem and cervical spinal  cord, compatible with demyelinating disease.  No areas of active  demyelination.       Hepatitis B surface antigen 2/19/2020: Nonreactive. Hepatitis B surface antibody 2/19/2020: Reactive  Hep Be antibody 2/19/2020: Negative. OUMOU virus antibody titer 2/24/20:   JCV antibody: Positive.,  Index value: 3.74     Varicella zoster Ab, Ig G 2/19/20: Immune    MRI brain, done at [facility] [place], [place] (9/11/2020): Very numerous demyelinating plaques identified within cerebral hemispheres bilaterally, cerebellum bilaterally and within the brainstem. There is progressive involvement of a plaque within the upper midline medulla. There are 2 possibly 3 new enhancing demyelinating plaques identified. The largest is a curvilinear juxtacortical plaque in the inferior lateral left frontal lobe. There is continued atrophy/thinning of the corpus callosum with demyelinating plaques within corpus callosum. MRI cervical spine, done at [facility] [place], [place] (9/11/2020): Very numerous nonenhancing demyelinating plaques again identified within the cervical spinal cord. No active enhancing spinal cord plaques identified. MRI thoracic spine,  done at [place], [place] (9/11/2020):  Very numerous nonenhancing demyelinating plaques again identified within thoracic spinal cord.   No active enhancing spinal cord plaques identified        Impression and Plan: Ms. Nick Short is a 33 y.o. female with   Primary Progressive multiple Sclerosis dx 2011; post pulse steroid rx x multiple times; waiting for clearance for IV Ocrevus infusion; she had seen ID specialist on 1/13/21. I spoke to Dr. Timothy Harrison today. She wanted the patient to get quantiferon tb test. But patient never got that done. Patient stated that she will get it done today and give us a call after that. Benign breast mass; family history of breast CA: Personal breast biopsy was negative; has had consultation with oncologist, Dr. Maddei Brunner. Patient clearly understood that she needs to have periodic breast ca surveillance. 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; continue Acyclovir 400 bid     Hx of multiple hosp (11/4/19 & Feb 2020 & March 2020& Sept2020 ) for right-sided weakness; right-sided spasticity; s/p Pulse steroid therapy  Chronic paraplegia  OUMOU Virus +ve with Ab titre 3.74; Recent Brain MRI without any changes suggesting PML; to cont monitoring  Vitamin D deficiency: was on Vitamin D supplements     Thyroid disease; likely hypopituitarism   Spasticity and dysesthesias     Will continue Baclofen and Gabapentin for spasticity and dysthesias  Also to continue vitamin D supplements and thyroid supplements. Continue PT/ OT.      Disease modifying therapy:  Discussion done in the past regarding various disease modifying therapies for progressive multiple sclerosis; these include Siponimod, ocrelizumab, rituximab, mitoxantrone, cyclophosphamide & T etc. etc.  She preferred IV eculizumab therapy. She will be initiated on IV Ocrevus infusion once clearance obtained. Follow-up in clinic in 6-8 weeks. Please note that portions of this note were completed with a voice recognition program.  Although every effort was made to ensure the accuracy of this automated transcription, some errors in transcription may have occurred; occasionally words are mis-transcribed.    Thank you for understanding.

## 2021-04-01 ENCOUNTER — HOSPITAL ENCOUNTER (OUTPATIENT)
Age: 31
Setting detail: SPECIMEN
Discharge: HOME | End: 2021-04-01
Payer: MEDICAID

## 2021-04-01 DIAGNOSIS — T45.1X5A IMMUNOSUPPRESSED DUE TO CHEMOTHERAPY (HCC): ICD-10-CM

## 2021-04-01 DIAGNOSIS — G82.20 LOWER PARAPLEGIA (HCC): ICD-10-CM

## 2021-04-01 DIAGNOSIS — D84.9 IMMUNOSUPPRESSED STATUS (HCC): ICD-10-CM

## 2021-04-01 DIAGNOSIS — D84.821 IMMUNOSUPPRESSED DUE TO CHEMOTHERAPY (HCC): ICD-10-CM

## 2021-04-01 DIAGNOSIS — Z79.899 IMMUNOSUPPRESSED DUE TO CHEMOTHERAPY (HCC): ICD-10-CM

## 2021-04-01 DIAGNOSIS — G35 PRIMARY CHRONIC PROGRESSIVE MULTIPLE SCLEROSIS (HCC): ICD-10-CM

## 2021-04-01 LAB
HBV SURFACE AB TITR SER: 64.5 MIU/ML
HEPATITIS B CORE TOTAL ANTIBODY: NONREACTIVE
HEPATITIS B SURFACE ANTIGEN: NONREACTIVE

## 2021-04-01 PROCEDURE — 86317 IMMUNOASSAY INFECTIOUS AGENT: CPT

## 2021-04-01 PROCEDURE — 86704 HEP B CORE ANTIBODY TOTAL: CPT

## 2021-04-01 PROCEDURE — 36415 COLL VENOUS BLD VENIPUNCTURE: CPT

## 2021-04-01 PROCEDURE — 86480 TB TEST CELL IMMUN MEASURE: CPT

## 2021-04-01 PROCEDURE — 87340 HEPATITIS B SURFACE AG IA: CPT

## 2021-04-01 PROCEDURE — 86361 T CELL ABSOLUTE COUNT: CPT

## 2021-04-02 LAB
ABSOLUTE CD 4 HELPER: 885 /UL (ref 309–1571)
CD4 % HELPER T CELL: 50 % (ref 27–64)
LYMPHOCYTES # BLD: 29 % (ref 24–44)
WBC # BLD: 6.1 K/UL (ref 3.5–11)

## 2021-04-04 LAB
QUANTI TB GOLD PLUS: NEGATIVE
QUANTI TB1 MINUS NIL: 0 IU/ML (ref 0–0.34)
QUANTI TB2 MINUS NIL: 0 IU/ML (ref 0–0.34)
QUANTIFERON MITOGEN: 9.11 IU/ML
QUANTIFERON NIL: 0.04 IU/ML

## 2021-04-22 ENCOUNTER — TELEPHONE (OUTPATIENT)
Dept: NEUROLOGY | Age: 31
End: 2021-04-22

## 2021-04-22 NOTE — TELEPHONE ENCOUNTER
[employer]  Please give me a remainder; I will work on it.    Thank you.   -dr. Frantz Alvarado

## 2021-04-22 NOTE — TELEPHONE ENCOUNTER
Dr. Alirio Saini it looks like she had blood work done.   If you want pt. to start on Ocrevus you will need to send through a new therapy plan for it to be infused at [id number] [place] [address],[address]

## 2021-04-22 NOTE — TELEPHONE ENCOUNTER
Anjana Piedra called to see about scheduling her Ocrevus infusion. Please let her know if she has done all the needed testing to be able to get it.

## 2021-05-07 NOTE — TELEPHONE ENCOUNTER
Isabelle  Please let the patient know that I did leave a message to Dr. Lauren Manning. Once I talk to her; then I will send the script.   Thank you.   -dr. Junior Expose

## 2021-05-11 NOTE — TELEPHONE ENCOUNTER
Gerri Never called asking when she will be able to get her infusion. I let her know that you were waiting to talk to Dr. Khushboo Fisher. Please advise.

## 2021-05-12 NOTE — TELEPHONE ENCOUNTER
Den  Please let the patient know that I can see her as add on virtual visit at end of day and discuss with her.     Thank you.   -dr. Ericka Najera

## 2021-05-17 ENCOUNTER — OFFICE VISIT (OUTPATIENT)
Dept: INFECTIOUS DISEASES | Age: 31
End: 2021-05-17
Payer: MEDICAID

## 2021-05-17 VITALS
HEART RATE: 85 BPM | DIASTOLIC BLOOD PRESSURE: 68 MMHG | OXYGEN SATURATION: 95 % | SYSTOLIC BLOOD PRESSURE: 109 MMHG | WEIGHT: 115 LBS | TEMPERATURE: 97.1 F | BODY MASS INDEX: 17.43 KG/M2 | HEIGHT: 68 IN | RESPIRATION RATE: 12 BRPM

## 2021-05-17 DIAGNOSIS — T45.1X5A IMMUNOSUPPRESSED DUE TO CHEMOTHERAPY (HCC): Primary | ICD-10-CM

## 2021-05-17 DIAGNOSIS — Z79.899 IMMUNOSUPPRESSED DUE TO CHEMOTHERAPY (HCC): Primary | ICD-10-CM

## 2021-05-17 DIAGNOSIS — D84.821 IMMUNOSUPPRESSED DUE TO CHEMOTHERAPY (HCC): Primary | ICD-10-CM

## 2021-05-17 PROCEDURE — 99214 OFFICE O/P EST MOD 30 MIN: CPT | Performed by: INTERNAL MEDICINE

## 2021-05-17 RX ORDER — ACYCLOVIR 400 MG/1
200 TABLET ORAL 2 TIMES DAILY
Qty: 30 TABLET | Refills: 11 | Status: SHIPPED | OUTPATIENT
Start: 2021-05-17 | End: 2021-06-16

## 2021-05-17 ASSESSMENT — ENCOUNTER SYMPTOMS
ABDOMINAL DISTENTION: 0
CHEST TIGHTNESS: 0
EYE DISCHARGE: 0
COLOR CHANGE: 0
EYE REDNESS: 0

## 2021-05-17 NOTE — PROGRESS NOTES
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 Immunosuppressed due to chemotherapy  T-[place] Cells (CD4) Count    acyclovir (ZOVIRAX) 400 MG tablet       Return in about 3 months (around 8/17/2021). History of Present Illness:   Gabriele Barboza is a 33y.o.-year-old  female who presents with   Chief Complaint   Patient presents with    Frequent Infections     was wondering about starting medication   new pt 12/16/20  MS w right arm and bilat LE paralysis, good mentation, wheelchair, presents for viral clearance for ocrelizumab after failing other regimens. Tested + for the JCV AB - post hep B vaccine 2015. No other known immunosuppressive diseases. ocrelizumab anti CD20- can increase the reactivation of Herpes V and lead to breast CA and reactivate OUMOU V as encephalitis . No comments on TB reactivation or PCP infection. JCV CNS infection seems rare but related to severely suppressed CD4 counts. Pt tested + JCV AB + and will be at risk for this as well as at risk for breast CA due to her family hx.   I can add acyclovir to control relapses of the HSV1 that usually causes her lip ulcers, it will help control varicella from relapsing as well  she had the Hep B vaccine and should be safe from hep B reactivation  Will need quantiferon TB gold as well if this medication is planned    I think it is her choice to proceed with this med considering the risks with it or without is. See me in 4 weeks. 1./11/2021  RN:   Anastasiia Brasher with nursing facility again to send lab results they told me this time she wasn't a patient I then called patient and asked her if she had labs drawn she said she didn't know how to get them done I explained to her how to go to any [facility] facilty and they would draw what was ordered. She then said I need a ride to go. She sounded very confused I told wait for her vv vist on Wednesday and we would figure out about the labs    Virtual visit 1/12/21  Has not started the Ocrevis and did not see neuro since our last visit. Was not able to make his apointt, was not ready in her chair for that day. Did not do the blood test yet. She will call neuro for apointment. Feeling good, no nausea, vomiting, diarrhea no fever  Defer to neuro to disc w pt and decide on the Ocrevis. Will ask her to get the blood work while she comes for the neuro visit -    Visit 5/17/21  Same  MS issues  And did not start the ocrevis ( ocrelizumab)  quantiferon TB is neg  No fever  CD4 885 on 4/2021      I have personally reviewed the past medical history, past surgical history, medications, social history, and family history, and I haveupdated the database accordingly.   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 No pertinent surgical history.     Medications:     Current Outpatient Medications:     acyclovir (ZOVIRAX) 400 MG tablet, Take 0.5 tablets by mouth 2 times daily, Disp: 30 tablet, Rfl: 11    gabapentin (NEURONTIN) 100 MG capsule, TAKE ONE CAP TID, Disp: 90 capsule, Rfl: 3    baclofen (LIORESAL) 10 MG tablet, Take 1 tablet by mouth 3 times daily, Disp: 90 tablet, Rfl: 3    vitamin D3 (CHOLECALCIFEROL) 25 MCG (1000 UT) TABS tablet, Once daily, Disp: 30 tablet, Rfl: 3    ondansetron (ZOFRAN) 4 MG tablet, , Disp: , Rfl:     potassium chloride (KLOR-CON M) 20 MEQ extended release tablet, , Disp: , Rfl:     ipratropium-albuterol (DUONEB) 0.5-2.5 (3) MG/3ML SOLN nebulizer solution, , Disp: , Rfl:     fluticasone (FLONASE) 50 MCG/ACT nasal spray, , Disp: , Rfl:     neomycin-polymyxin-hydrocortisone (CORTISPORIN) 3.5-39020-5 otic solution, , Disp: , Rfl:     Multiple Vitamins-Minerals (MULTIVITAMIN PO), Take by mouth daily, Disp: , Rfl:     sennosides-docusate sodium (SENOKOT-S) 8.6-50 MG tablet, Take 1 tablet by mouth 2 times daily, Disp: , Rfl:     ibuprofen (ADVIL;MOTRIN) 400 MG tablet, Take 400 mg by mouth every 6 hours as needed for Pain, Disp: , Rfl:     acetaminophen (ACETAMINOPHEN 8 HOUR) 650 MG extended release tablet, Take 650 mg by mouth every 8 hours as needed for Pain, Disp: , Rfl:     bisacodyl (DULCOLAX) 10 MG suppository, Place 10 mg rectally daily, Disp: , Rfl:     ferrous sulfate 325 (65 Fe) MG tablet, Take 325 mg by mouth daily (with breakfast), Disp: , Rfl:     Magnesium Hydroxide (MILK OF MAGNESIA PO), Take 30 mLs by mouth as needed, Disp: , Rfl:     ondansetron (ZOFRAN-ODT) 4 MG disintegrating tablet, Take 1 tablet by mouth every 6 hours as needed for Nausea, Disp: , Rfl:     levothyroxine (SYNTHROID) 50 MCG tablet, Take 1 tablet by mouth Daily, Disp: 30 tablet, Rfl: 3      Social History:     Social History     Socioeconomic History    Marital status: Single     Spouse name: Not on file    Number of children: Not on file    Years of education: Not on file    Highest education level: Not on file   Occupational History    Not on file   Tobacco Use    Smoking status: Never Smoker    Smokeless tobacco: Never Used   Vaping Use    Vaping Use: Never used   Substance and Sexual Activity    Alcohol use: No    Drug use: Yes     Types: Marijuana    Sexual activity: Yes     Birth control/protection: Other-see comments     Comment: does not use birth control d/t intercourse is infrequent   Other Topics Concern    Not on file   Social History Narrative    Not on file     Social Determinants of Health     Financial Resource Strain:     Difficulty of Paying Living Expenses:    Food Insecurity:     Worried About Running Out of Food in the Last Year:     Ran Out of Food in the Last Year:    Transportation Needs:     Lack of Transportation (Medical):      Lack of Transportation (Non-Medical):    Physical Activity:     Days of Exercise per Week:     Minutes of Exercise per Session:    Stress:     Feeling of Stress :    Social Connections:     Frequency of Communication with Friends and Family:     Frequency of Social Gatherings with Friends and Family:     Attends [employer] Services:     Active Member of Clubs or Organizations:     Attends Club or Organization Meetings:     Marital Status:    Intimate Partner Violence:     Fear of Current or Ex-Partner:     Emotionally Abused:     Physically Abused:     Sexually Abused:        Family History:     Family History   Problem Relation Age of Onset    No Known Problems Mother     Stroke Father     No Known Problems Sister     No Known Problems Brother         Allergies:   Ativan [lorazepam], Benadryl

## 2021-06-11 ENCOUNTER — HOSPITAL ENCOUNTER (OUTPATIENT)
Facility: MEDICAL CENTER | Age: 31
End: 2021-06-11
Payer: MEDICAID

## 2021-06-14 ENCOUNTER — HOSPITAL ENCOUNTER (OUTPATIENT)
Dept: MAMMOGRAPHY | Age: 31
Discharge: HOME | End: 2021-06-16
Payer: MEDICAID

## 2021-06-14 DIAGNOSIS — Z12.31 SCREENING MAMMOGRAM FOR HIGH-RISK PATIENT: ICD-10-CM

## 2021-07-16 ENCOUNTER — HOSPITAL ENCOUNTER (OUTPATIENT)
Facility: MEDICAL CENTER | Age: 31
End: 2021-07-16
Payer: MEDICAID

## 2021-07-22 ENCOUNTER — TELEPHONE (OUTPATIENT)
Dept: ONCOLOGY | Age: 31
End: 2021-07-22

## 2021-07-26 ENCOUNTER — OFFICE VISIT (OUTPATIENT)
Dept: ONCOLOGY | Age: 31
End: 2021-07-26
Payer: MEDICAID

## 2021-07-26 ENCOUNTER — TELEPHONE (OUTPATIENT)
Dept: ONCOLOGY | Age: 31
End: 2021-07-26

## 2021-07-26 VITALS
DIASTOLIC BLOOD PRESSURE: 58 MMHG | RESPIRATION RATE: 16 BRPM | SYSTOLIC BLOOD PRESSURE: 105 MMHG | HEART RATE: 73 BPM | TEMPERATURE: 98.6 F

## 2021-07-26 DIAGNOSIS — Z80.3 FAMILY HISTORY OF BREAST CANCER: ICD-10-CM

## 2021-07-26 PROCEDURE — 99213 OFFICE O/P EST LOW 20 MIN: CPT | Performed by: INTERNAL MEDICINE

## 2021-07-26 PROCEDURE — 99211 OFF/OP EST MAY X REQ PHY/QHP: CPT | Performed by: INTERNAL MEDICINE

## 2021-07-26 NOTE — PROGRESS NOTES
DIAGNOSIS:   1. Multiple sclerosis   2. +ve JCV   3. Breast mass, left  4. Strong family hx of breast cancer     CURRENT THERAPY:  Considering ocrelizumab for MS     BRIEF CASE HISTORY:   Yasmin Bauman is a very pleasant 33 y.o. female who is referred to us for exam and mammogram prior to starting treatment for MS. She was recently diagnosed with primary progressive MS. And the plan was to treat her with  Ocrelizumab. She has significant family history of 2 great grandmothers and grandmother with breast cancer. She has previous smoking history, quit in 2005. INTERIM HISTORY: the patient presents today for follow up for breast mass. She is feeling well and has no complaints      PAST MEDICAL HISTORY: has a past medical history of Acquired central hypothyroidism, Arthritis, Asthma, Encounter for medication monitoring, Exacerbation of multiple sclerosis ([facility] [address] Family history of breast cancer, OUMOU virus antibody positive, Lower paraplegia ([address] Marijuana smoker, continuous, MS (multiple sclerosis) ([address] Neuromuscular disorder ([address] Primary chronic progressive multiple sclerosis ([address] and Rash in adult. PAST SURGICAL HISTORY: has no past surgical history on file. CURRENT MEDICATIONS:  has a current medication list which includes the following prescription(s): gabapentin, baclofen, vitamin d3, ondansetron, potassium chloride, ipratropium-albuterol, fluticasone, neomycin-polymyxin-hydrocortisone, multiple vitamin, sennosides-docusate sodium, ibuprofen, acetaminophen, bisacodyl, ferrous sulfate, magnesium hydroxide, ondansetron, and levothyroxine. ALLERGIES:  is allergic to ativan [lorazepam], benadryl [diphenhydramine], and chocolate. FAMILY HISTORY: Paternal grandmother: breast cancer in her 62s; Great grandmothers: breast cancer     SOCIAL HISTORY:  reports that she has never smoked. She has never used smokeless tobacco. She reports current drug use. Drug: Marijuana.  She reports that she does not drink alcohol. REVIEW OF SYSTEMS:   General: No fever or night sweats. Weight is stable. ENT: No double or blurred vision, no tinnitus or hearing problem, no dysphagia or sore throat   Respiratory: No chest pain, no shortness of breath, no cough or hemoptysis. Cardiovascular: Denies chest pain, PND or orthopnea. No L E swelling or palpitations. Gastrointestinal: No nausea or vomiting, abdominal pain, diarrhea or constipation. Genitourinary: Denies dysuria, hematuria, frequency, urgency or incontinence. Neurological: advanced neurological disease. Hypertonia and decreased mobility. Musculoskeletal: No arthralgia no back pain or joint swelling. Skin: multiple Cafe- [place] lait spots, subcutaneous soft masses ? lipomas. Psychiatric:  No anxiety, no depression. Endocrine: No diabetes or thyroid disease. Hematologic: No bleeding, no adenopathy. PHYSICAL EXAM: Shows a well appearing 33y.o.-year-old female who is not in pain or distress. Vital Signs: Blood pressure (!) 105/58, pulse 73, temperature 98.6 °F (37 °C), temperature source Temporal, resp. rate 16. HEENT: Normocephalic and atraumatic. Pupils are equal, round, reactive to light and accommodation. Extraocular muscles are intact. Neck: Showed no JVD, no carotid bruit . Lungs: Clear to auscultation bilaterally. Heart: Regular without any murmur. Abdomen: Soft, nontender. No hepatosplenomegaly. Extremities: Lower extremities show no edema, clubbing, or cyanosis. Breasts: Fibroglandular changes in the breast unchanged from before, Neuro exam: signs of advanced upper motor neuron disease. With hypertonia and weakness. She is not spastic. And able to control her movement but she is very weak.   Lymphatic: no adenopathy appreciated in the supraclavicular, axillary, cervical or inguinal area  Skin: multiple cafe au lait spots on the trunk, multiple soft subcutaneous lesions on the back    REVIEW OF LABORATORY DATA:   Lab Results   Component Value Date WBC 6.1 04/01/2021    HGB 12.6 01/28/2020    HCT 39.7 01/28/2020    MCV 93.9 01/28/2020     01/28/2020       Chemistry        Component Value Date/Time     01/28/2020 0625    K 4.2 01/28/2020 0625     01/28/2020 0625    CO2 25 01/28/2020 0625    BUN 12 01/28/2020 0625    CREATININE 0.48 (L) 01/28/2020 0625        Component Value Date/Time    CALCIUM 9.4 01/28/2020 0625            REVIEW OF RADIOLOGICAL RESULTS:       IMPRESSION:   1. Multiple sclerosis   2. Breast mass - benign   3. strong family hx of breast cancer. PLAN:   1. Patient continues to do very well clinically  2. Mammogram was not done, will order that to be done as soon as possible  3.  The patient will have an appointment with neurology to discuss treatment for her multiple sclerosis, she is cleared to receive the treatment

## 2021-08-03 ENCOUNTER — HOSPITAL ENCOUNTER (OUTPATIENT)
Dept: MAMMOGRAPHY | Age: 31
End: 2021-08-03
Payer: MEDICAID

## 2021-08-03 ENCOUNTER — OFFICE VISIT (OUTPATIENT)
Dept: NEUROLOGY | Age: 31
End: 2021-08-03
Payer: MEDICAID

## 2021-08-03 ENCOUNTER — HOSPITAL ENCOUNTER (OUTPATIENT)
Dept: ULTRASOUND IMAGING | Age: 31
Discharge: HOME | End: 2021-08-05
Payer: MEDICAID

## 2021-08-03 VITALS
DIASTOLIC BLOOD PRESSURE: 69 MMHG | WEIGHT: 110 LBS | HEIGHT: 68 IN | HEART RATE: 74 BPM | BODY MASS INDEX: 16.67 KG/M2 | SYSTOLIC BLOOD PRESSURE: 115 MMHG

## 2021-08-03 DIAGNOSIS — N64.89 SEROMA OF BREAST: ICD-10-CM

## 2021-08-03 DIAGNOSIS — R76.8 JC VIRUS ANTIBODY POSITIVE: ICD-10-CM

## 2021-08-03 DIAGNOSIS — N64.89 SEROMA OF BREAST: Primary | ICD-10-CM

## 2021-08-03 DIAGNOSIS — D84.9 IMMUNOSUPPRESSED STATUS (HCC): ICD-10-CM

## 2021-08-03 DIAGNOSIS — G82.20 LOWER PARAPLEGIA (HCC): ICD-10-CM

## 2021-08-03 DIAGNOSIS — G35 PRIMARY CHRONIC PROGRESSIVE MULTIPLE SCLEROSIS (HCC): Primary | ICD-10-CM

## 2021-08-03 PROCEDURE — 99214 OFFICE O/P EST MOD 30 MIN: CPT | Performed by: PSYCHIATRY & NEUROLOGY

## 2021-08-03 PROCEDURE — 76642 ULTRASOUND BREAST LIMITED: CPT

## 2021-08-03 NOTE — PROGRESS NOTES
NEUROLOGY FOLLOW-UP  Patient Name:  Susan Mccall  :   1990  Clinic Visit Date: 8/3/2021  Last visit: 10/22/2020      I saw Ms. Susan Mccall in follow-up in the office today in continuation of neurologic care. She is a 33 y.o. right handed female with Primary progressive multiple sclerosis. Today she is brought to the clinic by transportation from her house in [facility]. Her mom, Bobette Primrose is at work today and did not accompany the patient to the clinic. She has not been hosp at [facility] Since 2020 and also patient did not have any symptoms or signs indicating MS exacerbation since the last visit. Patient stated that she had seen infectious disease specialist, Dr. Kiran Brown and she had started the patient on acyclovir 400 mg twice daily as preventative therapy. Patient was told that she needs CD4 check every 3 months with a target >100 (presently her CD4 was 885 on ). Patient was advised to have breast cancer close surveillance. Patient had seen oncologist, Dr. Linda Mccall and was told to have mammogram and also was told that \"cleared for Ocrevus infusion\". Patient states \"I did look at information handout\" and she wants to be initiated on it ASAP. She had nodular mass in breast and with family history of breast cancer; she had consultation with oncologist, Dr. Chrissy Nicole. She has had mammogram and she finally got clearance from oncologist.  Patient also stated that her paraplegia and lower extremities continued. Patient also stated that she had received COVID-19 virus vaccine in . Of note; she had chronic bilateral lower extremity paraplegia since  and has been wheelchair dependent since  with underlying primary progressive multiple sclerosis. Then patient has had multiple hospitalizations symptoms suggesting MS progression; requiring IV steroid therapy. Multiple discussions done in the past for the need to be on disease modifying therapy.   Discussed and reviewed literature with the patient regarding different options. Patient reviewed the literature and she wanted IV Ocrevus. She has strong family hx of breast cancer in her paternal grandma and sister of paternal grandma. She has been on vitamin D and gabapentin for pain and cramps along with the painful sensations in bilateral lower extremities. Pins-and-needles sensations occur in bilateral lower extremities. Has not been on any antispasmodics. Previous Neurological Work-up:   CT head 11/4/2019: No acute intracranial abnormality. Patchy white matter disease consistent with history of multiple sclerosis. Mild generalized atrophy for patient's age. MRI brain (w/wo) 11/5/2019: Extensive high signal in brain parenchyma above and below the tentorium. No abnormal enhancement are restricted diffusion signal to suggest recent plaque activity. No comparison exams to assess for stability or interval increase of patient's underlying demyelinating process. Vitamin D 25-hydroxy level 11/5/2019: 7.9  Vitamin B12 level: 461  TSH level 11/5/2019: 0.2 (0.3 -5)  Free T4 level 11/5/2019:  0.76 (0.9-1.7)   MRI Cervical Spine (11/6/19):  Extensive white matter lesions noted in the brainstem and cervical spinal  cord, compatible with demyelinating disease.  No areas of active  demyelination. DISEASE SUMMARY  Date of onset: 2011  Date of diagnosis of MS: 2011  Disease course at onset: ? Relapsing-Remitting; has had multiple hosp \"~ 10 hosp\" for iv steroids as per patient  Current disease course: Progressive multiple sclerosis  Previous disease therapies: Copaxone and Gilenya  Current disease therapy: none  CSF:   JCV serology result and date: OUMOU virus +ve ; index value 3.74 (2/24/20).    Vitamin D: 7.9 (11/5/2019)  Smoking status: none  EDSS: 7.5 consisting of paraplegia; dysmetria; sensory impairment  9HPT:  T25W: -N/A-wheelchair-bound     Review of systems done by staff reviewed and pertinent positives include Balance difficulties, dizziness, weakness, numbness, spasticity, neck pain, back pain, stiffness, joint pains and recent hosp as stated above.     Current Outpatient Medications on File Prior to Visit   Medication Sig Dispense Refill    gabapentin (NEURONTIN) 100 MG capsule TAKE ONE CAP TID (Patient not taking: Reported on 7/26/2021) 90 capsule 3    baclofen (LIORESAL) 10 MG tablet Take 1 tablet by mouth 3 times daily (Patient not taking: Reported on 7/26/2021) 90 tablet 3    vitamin D3 (CHOLECALCIFEROL) 25 MCG (1000 UT) TABS tablet Once daily (Patient not taking: Reported on 7/26/2021) 30 tablet 3    ondansetron (ZOFRAN) 4 MG tablet  (Patient not taking: Reported on 7/26/2021)      potassium chloride (KLOR-CON M) 20 MEQ extended release tablet  (Patient not taking: Reported on 7/26/2021)      ipratropium-albuterol (DUONEB) 0.5-2.5 (3) MG/3ML SOLN nebulizer solution  (Patient not taking: Reported on 7/26/2021)      fluticasone (FLONASE) 50 MCG/ACT nasal spray  (Patient not taking: Reported on 7/26/2021)      neomycin-polymyxin-hydrocortisone (CORTISPORIN) 3.5-49496-4 otic solution  (Patient not taking: Reported on 7/26/2021)      Multiple Vitamins-Minerals (MULTIVITAMIN PO) Take by mouth daily (Patient not taking: Reported on 7/26/2021)      sennosides-docusate sodium (SENOKOT-S) 8.6-50 MG tablet Take 1 tablet by mouth 2 times daily (Patient not taking: Reported on 7/26/2021)      ibuprofen (ADVIL;MOTRIN) 400 MG tablet Take 400 mg by mouth every 6 hours as needed for Pain (Patient not taking: Reported on 7/26/2021)      acetaminophen (ACETAMINOPHEN 8 HOUR) 650 MG extended release tablet Take 650 mg by mouth every 8 hours as needed for Pain (Patient not taking: Reported on 7/26/2021)      bisacodyl (DULCOLAX) 10 MG suppository Place 10 mg rectally daily (Patient not taking: Reported on 7/26/2021)      ferrous sulfate 325 (65 Fe) MG tablet Take 325 mg by mouth daily (with breakfast) (Patient not taking: Reported on 7/26/2021)      Magnesium Hydroxide (MILK OF MAGNESIA PO) Take 30 mLs by mouth as needed (Patient not taking: Reported on 7/26/2021)      ondansetron (ZOFRAN-ODT) 4 MG disintegrating tablet Take 1 tablet by mouth every 6 hours as needed for Nausea (Patient not taking: Reported on 7/26/2021)      levothyroxine (SYNTHROID) 50 MCG tablet Take 1 tablet by mouth Daily (Patient not taking: Reported on 7/26/2021) 30 tablet 3     No current facility-administered medications on file prior to visit. Allergies: Dane Mcdermott is allergic to ativan [lorazepam], benadryl [diphenhydramine], and chocolate. 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    Arthritis     Asthma     Encounter for medication monitoring 11/14/2019    Exacerbation of multiple sclerosis ([facility] [address] 2018    Family history of breast cancer 3/7/2020    OUMOU virus antibody positive 3/7/2020    Lower paraplegia ([facility] [address] 11/14/2019    Marijuana smoker, continuous 11/5/2019    MS (multiple sclerosis) ([facility] [address]     Neuromuscular disorder ([facility] [address]     Primary chronic progressive multiple sclerosis ([facility] [address] 11/14/2019    Rash in adult 11/5/2019       History reviewed. No pertinent surgical history. Social History: Dane Mcdermott  reports that she has never smoked. She has never used smokeless tobacco. She reports current drug use. Drug: Marijuana. She reports that she does not drink alcohol. Family History   Problem Relation Age of Onset    No Known Problems Mother     Stroke Father     No Known Problems Sister     No Known Problems Brother      On exam:Blood pressure 115/69, pulse 74, height 5' 8\" (1.727 m), weight 110 lb (49.9 kg).     NEUROLOGIC EXAMINATION  GENERAL  Appears comfortable and in no distress   HEENT  NC/ AT   NECK  Supple and no bruits heard   MENTAL STATUS:  Alert, oriented, intact memory, no confusion, normal speech, normal language, no hallucination or delusion; appropriate affect   CRANIAL NERVES: II -      Pupils reactive bilaterally; fundus exam revealed intact venous pulsations; visual fields intact to confrontation  III,IV,VI -  EOMs full, no afferent defect, no                      NIKITA, no ptosis  V     -     diminished light touch and pinprick on right side   VII    -     Normal facial symmetry  VIII   -     Intact hearing  IX,X -     Symmetrical palate  XI    -     Symmetrical shoulder shrug  XII   -     Midline tongue, no atrophy    MOTOR FUNCTION:  significant for weakness of grade 0/5 in bilateral lower extremities and -4/5 in right upper extremity and 5/5 in left upper extremity with atrophy in right upper extremity and spasticity in bilateral lower extremities with no tremors. SENSORY FUNCTION:  Diminished pinprick on right side; diminished light touch and pinprick and temperature in bilateral lower extremities    CEREBELLAR FUNCTION:  Dysmetria to finger-nose-finger testing in bilateral upper extremities    REFLEX FUNCTION:  Hyperactive DTRs with bilateral ankle clonus and bilateral extensor plantars    STATION and GAIT  wheelchair bound; deferred        MRI brain (w/wo) 11/5/2019: Extensive high signal in brain parenchyma above and below the tentorium. No abnormal enhancement are restricted diffusion signal to suggest recent plaque activity. No comparison exams to assess for stability or interval increase of patient's underlying demyelinating process.         MRI Cervical Spine (11/6/19):  Extensive white matter lesions noted in the brainstem and cervical spinal  cord, compatible with demyelinating disease.  No areas of active  demyelination.       Hepatitis B surface antigen 2/19/2020: Nonreactive. Hepatitis B surface antibody 2/19/2020: Reactive  Hep Be antibody 2/19/2020: Negative.     OUMOU virus antibody titer 2/24/20:   JCV antibody: Positive.,  Index value: 3.74     Varicella zoster Ab, Ig G 2/19/20: Immune    MRI brain, done at [facility], [address] (9/11/2020): Very numerous demyelinating plaques identified within cerebral hemispheres bilaterally, cerebellum bilaterally and within the brainstem. There is progressive involvement of a plaque within the upper midline medulla. There are 2 possibly 3 new enhancing demyelinating plaques identified. The largest is a curvilinear juxtacortical plaque in the inferior lateral left frontal lobe. There is continued atrophy/thinning of the corpus callosum with demyelinating plaques within corpus callosum. MRI cervical spine, done at [facility], [address] (9/11/2020): Very numerous nonenhancing demyelinating plaques again identified within the cervical spinal cord. No active enhancing spinal cord plaques identified. MRI thoracic spine,  done at [facility], [address] (9/11/2020):  Very numerous nonenhancing demyelinating plaques again identified within thoracic spinal cord. No active enhancing spinal cord plaques identified      Impression and Plan: Ms. Edd Busby is a 33 y.o. female with   Primary Progressive multiple Sclerosis dx 2011  Regarding clearance for IV Ocrevus infusion; she has had consultations with ID specialist and oncologist review of their notes stated that \"patient is cleared for IV Ocrevus infusion\". Benign breast mass; family history of breast CA: Personal breast biopsy was negative; has had consultation with oncologist, Dr. Jackie Sosa and \"cleared for Elva  infusion\". But patient also clearly understood that she needs to have periodic breast ca surveillance. 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; continue Acyclovir 400 bid and CD4 check once every 3 months with target CD4 count not less than 100 to avoid JCV reactivation     Hx of multiple hosp (11/4/19 & Feb 2020 & March 2020& Sept2020 ) for right-sided weakness; right-sided spasticity; s/p Pulse steroid therapy  Chronic paraplegia  OUMOU Virus +ve with Ab titre 3.74;  Recent Brain MRI without any changes suggesting PML; to cont monitoring  Vitamin D deficiency: was on Vitamin D supplements     Thyroid disease; likely hypopituitarism   Spasticity and dysesthesias     Will continue Baclofen and Gabapentin for spasticity and dysthesias  Also to continue vitamin D supplements and thyroid supplements. Continue PT/ OT.      Follow-up in clinic in 4-6 wks      Please note that portions of this note were completed with a voice recognition program.  Although every effort was made to ensure the accuracy of this automated transcription, some errors in transcription may have occurred; occasionally words are mis-transcribed. Thank you for understanding.

## 2021-08-04 ENCOUNTER — TELEPHONE (OUTPATIENT)
Dept: NEUROLOGY | Age: 31
End: 2021-08-04

## 2021-08-04 NOTE — TELEPHONE ENCOUNTER
If you plan to proceed with Allen Goel now you will need to put in a new therapy plan for Ocrevus. Then [facility] contacts her after they do a precert with her insurance. Please enter the therapy plan for [facility]. Thank you. There was one entered but it is  now. It was under Ayala Pickering. CNP name so please do a new one.

## 2021-08-05 RX ORDER — METHYLPREDNISOLONE SODIUM SUCCINATE 125 MG/2ML
100 INJECTION, POWDER, LYOPHILIZED, FOR SOLUTION INTRAMUSCULAR; INTRAVENOUS ONCE
Status: CANCELLED | OUTPATIENT
Start: 2021-08-09

## 2021-08-05 RX ORDER — DIPHENHYDRAMINE HYDROCHLORIDE 50 MG/ML
50 INJECTION INTRAMUSCULAR; INTRAVENOUS ONCE
Status: CANCELLED | OUTPATIENT
Start: 2021-08-09 | End: 2021-08-09

## 2021-08-05 RX ORDER — HEPARIN SODIUM (PORCINE) LOCK FLUSH IV SOLN 100 UNIT/ML 100 UNIT/ML
500 SOLUTION INTRAVENOUS PRN
Status: CANCELLED | OUTPATIENT
Start: 2021-08-09

## 2021-08-05 RX ORDER — SODIUM CHLORIDE 9 MG/ML
INJECTION, SOLUTION INTRAVENOUS CONTINUOUS
Status: CANCELLED | OUTPATIENT
Start: 2021-08-09

## 2021-08-05 RX ORDER — HYDROXYZINE HYDROCHLORIDE 25 MG/1
25 TABLET, FILM COATED ORAL PRN
Status: CANCELLED
Start: 2021-08-09

## 2021-08-05 RX ORDER — EPINEPHRINE 1 MG/ML
0.3 INJECTION, SOLUTION, CONCENTRATE INTRAVENOUS PRN
Status: CANCELLED | OUTPATIENT
Start: 2021-08-09

## 2021-08-05 RX ORDER — METHYLPREDNISOLONE SODIUM SUCCINATE 125 MG/2ML
125 INJECTION, POWDER, LYOPHILIZED, FOR SOLUTION INTRAMUSCULAR; INTRAVENOUS ONCE
Status: CANCELLED | OUTPATIENT
Start: 2021-08-09 | End: 2021-08-09

## 2021-08-05 RX ORDER — IBUPROFEN 600 MG/1
600 TABLET ORAL PRN
Status: CANCELLED
Start: 2021-08-09

## 2021-08-05 RX ORDER — ONDANSETRON 2 MG/ML
4 INJECTION INTRAMUSCULAR; INTRAVENOUS PRN
Status: CANCELLED
Start: 2021-08-09

## 2021-08-05 RX ORDER — ACETAMINOPHEN 325 MG/1
650 TABLET ORAL ONCE
Status: CANCELLED | OUTPATIENT
Start: 2021-08-09

## 2021-08-05 RX ORDER — SODIUM CHLORIDE 0.9 % (FLUSH) 0.9 %
10 SYRINGE (ML) INJECTION PRN
Status: CANCELLED | OUTPATIENT
Start: 2021-08-09

## 2021-08-05 RX ORDER — HYDROXYZINE HYDROCHLORIDE 25 MG/1
25 TABLET, FILM COATED ORAL ONCE
Status: CANCELLED
Start: 2021-08-09 | End: 2021-08-09

## 2021-08-05 RX ORDER — SODIUM CHLORIDE 0.9 % (FLUSH) 0.9 %
5 SYRINGE (ML) INJECTION PRN
Status: CANCELLED | OUTPATIENT
Start: 2021-08-09

## 2021-08-05 NOTE — TELEPHONE ENCOUNTER
I yanira [employer] and talked with Lovely Vazquez. She said the order that is in there is . Dr. Jorge Lopez can resign it. She will notify the  there at [phone] so she can try to get precert to initiate the prior authorization.

## 2021-08-06 NOTE — TELEPHONE ENCOUNTER
Jayme Ziegler with [facility] called. They can't accommodate Ghazal so she will have to go to [facility] for the infusion. She will transfer the order there. Also she has allergy to Benadryl so they will you hydroxyzine instead with premed.   I called Ghazal and let herk now the place of infusion will be [facility]

## 2021-08-16 ENCOUNTER — OFFICE VISIT (OUTPATIENT)
Dept: INFECTIOUS DISEASES | Age: 31
End: 2021-08-16
Payer: MEDICAID

## 2021-08-16 VITALS
WEIGHT: 108 LBS | HEART RATE: 96 BPM | HEIGHT: 68 IN | TEMPERATURE: 97.3 F | DIASTOLIC BLOOD PRESSURE: 69 MMHG | SYSTOLIC BLOOD PRESSURE: 108 MMHG | BODY MASS INDEX: 16.37 KG/M2

## 2021-08-16 DIAGNOSIS — D84.821 IMMUNOSUPPRESSED DUE TO CHEMOTHERAPY (HCC): Primary | ICD-10-CM

## 2021-08-16 DIAGNOSIS — G35 MULTIPLE SCLEROSIS (HCC): ICD-10-CM

## 2021-08-16 DIAGNOSIS — T45.1X5A IMMUNOSUPPRESSED DUE TO CHEMOTHERAPY (HCC): Primary | ICD-10-CM

## 2021-08-16 DIAGNOSIS — Z79.899 IMMUNOSUPPRESSED DUE TO CHEMOTHERAPY (HCC): Primary | ICD-10-CM

## 2021-08-16 PROCEDURE — 99214 OFFICE O/P EST MOD 30 MIN: CPT | Performed by: INTERNAL MEDICINE

## 2021-08-16 RX ORDER — ACYCLOVIR 400 MG/1
400 TABLET ORAL 2 TIMES DAILY
Qty: 60 TABLET | Refills: 11 | Status: SHIPPED | OUTPATIENT
Start: 2021-08-16 | End: 2021-09-15

## 2021-08-16 ASSESSMENT — ENCOUNTER SYMPTOMS
EYE REDNESS: 0
EYE DISCHARGE: 0
CHEST TIGHTNESS: 0
COLOR CHANGE: 0
ABDOMINAL DISTENTION: 0

## 2021-08-16 NOTE — PROGRESS NOTES
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 Immunosuppressed due to chemotherapy ([facility]  Lymphocyte Subset   2. Multiple sclerosis ([facility]  Lymphocyte Subset       No follow-ups on file. History of Present Illness:   Susan Mccall is a 33y.o.-year-old  female who presents with   Chief Complaint   Patient presents with    Follow-up     immunosuppressed due to chemo   new pt 12/16/20  MS w right arm and bilat LE paralysis, good mentation, wheelchair, presents for viral clearance for ocrelizumab after failing other regimens. Tested + for the JCV AB - post hep B vaccine 2015. No other known immunosuppressive diseases. ocrelizumab anti CD20- can increase the reactivation of Herpes V and lead to breast CA and reactivate OUMOU V as encephalitis . No comments on TB reactivation or PCP infection. JCV CNS infection seems rare but related to severely suppressed CD4 counts.     Pt tested + JCV AB + and will be at risk for this as well as at risk for breast CA due to her family hx. I can add acyclovir to control relapses of the HSV1 that usually causes her lip ulcers, it will help control varicella from relapsing as well  she had the Hep B vaccine and should be safe from hep B reactivation  Will need quantiferon TB gold as well if this medication is planned    I think it is her choice to proceed with this med considering the risks with it or without is. See me in 4 weeks. 1./11/2021  RN:   Froylan Mendez with nursing facility again to send lab results they told me this time she wasn't a patient I then called patient and asked her if she had labs drawn she said she didn't know how to get them done I explained to her how to go to any [facility] facilty and they would draw what was ordered. She then said I need a ride to go. She sounded very confused I told wait for her vv vist on Wednesday and we would figure out about the labs    Virtual visit 1/12/21  Has not started the Ocrevis and did not see neuro since our last visit. Was not able to make his apointt, was not ready in her chair for that day. Did not do the blood test yet. She will call neuro for apointment. Feeling good, no nausea, vomiting, diarrhea no fever  Defer to neuro to disc w pt and decide on the Ocrevis.   Will ask her to get the blood work while she comes for the neuro visit -    Visit 5/17/21  Same  MS issues  And did not start the ocrevis ( ocrelizumab)  quantiferon TB is neg  No fever  CD4 885 on 4/2021    Visit 8/16/21  No HSV or shingles and feels good- was on acyclovir but stopped it after the 2st script - did not realize she is to get the refill  Post covid vaccine 2020  mamogram per [facility] - following, and cleared for Ocrevis  did not receive the Ocrevis infusion yet, awaiting the preauth still by dr Jayshree Concepcion  MS same - but feels very weak, weaker than the usual  Mri brain neg    Exam- still very weak    Plan;  acyclovir 400 mg bid  cd4 in 6 months  See in 6 month unless symptoms earlier  Plan for ocrevis 300 mg q2 weeks then q 6 months          I have personally reviewed the past medical history, past surgical history, medications, social history, and family history, and I haveupdated the database accordingly. 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 No pertinent surgical history.     Medications:     Current Outpatient Medications:     acyclovir (ZOVIRAX) 400 MG tablet, Take 1 tablet by mouth 2 times daily, Disp: 60 tablet, Rfl: 11    gabapentin (NEURONTIN) 100 MG capsule, TAKE ONE CAP TID (Patient not taking: Reported on 7/26/2021), Disp: 90 capsule, Rfl: 3    baclofen (LIORESAL) 10 MG tablet, Take 1 tablet by mouth 3 times daily (Patient not taking: Reported on 7/26/2021), Disp: 90 tablet, Rfl: 3    vitamin D3 (CHOLECALCIFEROL) 25 MCG (1000 UT) TABS tablet, Once daily (Patient not taking: Reported on 7/26/2021), Disp: 30 tablet, Rfl: 3    ondansetron (ZOFRAN) 4 MG tablet, , Disp: , Rfl:     potassium chloride (KLOR-CON M) 20 MEQ extended release tablet, , Disp: , Rfl:     ipratropium-albuterol (DUONEB) 0.5-2.5 (3) MG/3ML SOLN nebulizer solution, , Disp: , Rfl:     fluticasone (FLONASE) 50 MCG/ACT nasal spray, , Disp: , Rfl:     neomycin-polymyxin-hydrocortisone (CORTISPORIN) 3.5-86583-9 otic solution, , Disp: , Rfl:     Multiple Vitamins-Minerals (MULTIVITAMIN PO), Take by mouth daily (Patient not taking: Reported on 7/26/2021), Disp: , Rfl:   sennosides-docusate sodium (SENOKOT-S) 8.6-50 MG tablet, Take 1 tablet by mouth 2 times daily (Patient not taking: Reported on 7/26/2021), Disp: , Rfl:     ibuprofen (ADVIL;MOTRIN) 400 MG tablet, Take 400 mg by mouth every 6 hours as needed for Pain (Patient not taking: Reported on 7/26/2021), Disp: , Rfl:     acetaminophen (ACETAMINOPHEN 8 HOUR) 650 MG extended release tablet, Take 650 mg by mouth every 8 hours as needed for Pain (Patient not taking: Reported on 7/26/2021), Disp: , Rfl:     bisacodyl (DULCOLAX) 10 MG suppository, Place 10 mg rectally daily (Patient not taking: Reported on 7/26/2021), Disp: , Rfl:     ferrous sulfate 325 (65 Fe) MG tablet, Take 325 mg by mouth daily (with breakfast) (Patient not taking: Reported on 7/26/2021), Disp: , Rfl:     Magnesium Hydroxide (MILK OF MAGNESIA PO), Take 30 mLs by mouth as needed (Patient not taking: Reported on 7/26/2021), Disp: , Rfl:     ondansetron (ZOFRAN-ODT) 4 MG disintegrating tablet, Take 1 tablet by mouth every 6 hours as needed for Nausea (Patient not taking: Reported on 7/26/2021), Disp: , Rfl:     levothyroxine (SYNTHROID) 50 MCG tablet, Take 1 tablet by mouth Daily (Patient not taking: Reported on 7/26/2021), Disp: 30 tablet, Rfl: 3      Social History:     Social History     Socioeconomic History    Marital status: Single     Spouse name: Not on file    Number of children: Not on file    Years of education: Not on file    Highest education level: Not on file   Occupational History    Not on file   Tobacco Use    Smoking status: Never Smoker    Smokeless tobacco: Never Used   Vaping Use    Vaping Use: Never used   Substance and Sexual Activity    Alcohol use: No    Drug use: Yes     Types: Marijuana    Sexual activity: Yes     Birth control/protection: Other-see comments     Comment: does not use birth control d/t intercourse is infrequent   Other Topics Concern    Not on file   Social History Narrative    Not on file Social Determinants of Health     Financial Resource Strain:     Difficulty of Paying Living Expenses:    Food Insecurity:     Worried About Running Out of Food in the Last Year:     [address] in the Last Year:    Transportation Needs:     Lack of Transportation (Medical):  Lack of Transportation (Non-Medical):    Physical Activity:     Days of Exercise per Week:     Minutes of Exercise per Session:    Stress:     Feeling of Stress :    Social Connections:     Frequency of Communication with Friends and Family:     Frequency of Social Gatherings with Friends and Family:     Attends Worship Services:     Active Member of Clubs or Organizations:     Attends Club or Organization Meetings:     Marital Status:    Intimate Partner Violence:     Fear of Current or Ex-Partner:     Emotionally Abused:     Physically Abused:     Sexually Abused:        Family History:     Family History   Problem Relation Age of Onset    No Known Problems Mother     Stroke Father     No Known Problems Sister     No Known Problems Brother         Allergies:   Ativan [lorazepam], Benadryl [diphenhydramine], and Chocolate     Review of Systems:   Review of Systems   Constitutional: Negative for activity change and appetite change. HENT: Negative for congestion. Eyes: Negative for discharge and redness. Respiratory: Negative for chest tightness. Cardiovascular: Negative for chest pain. Gastrointestinal: Negative for abdominal distention. Endocrine: Negative for cold intolerance, polydipsia and polyphagia. Genitourinary: Negative for dysuria and urgency. Musculoskeletal: Negative for arthralgias. Skin: Negative for color change. Allergic/Immunologic: Negative for food allergies and immunocompromised state. Neurological: Positive for weakness. Negative for dizziness. Hematological: Negative for adenopathy. Psychiatric/Behavioral: Negative for agitation.        Physical Examination : Blood pressure 108/69, pulse 96, temperature 97.3 °F (36.3 °C), height 5' 8\" (1.727 m), weight 108 lb (49 kg). Physical Exam  Constitutional:       General: She is not in acute distress. Appearance: Normal appearance. She is not ill-appearing or diaphoretic. HENT:      Head: Normocephalic and atraumatic. Mouth/Throat:      Mouth: Mucous membranes are moist.   Eyes:      General: No scleral icterus. Conjunctiva/sclera: Conjunctivae normal.      Pupils: Pupils are equal, round, and reactive to light. Cardiovascular:      Rate and Rhythm: Normal rate and regular rhythm. Heart sounds: Normal heart sounds. No murmur heard. Pulmonary:      Effort: No respiratory distress. Breath sounds: Normal breath sounds. Abdominal:      General: There is no distension. Tenderness: There is no abdominal tenderness. Genitourinary:     Comments: No domínguez  Musculoskeletal:         General: No swelling or deformity. Cervical back: Neck supple. No rigidity or tenderness. Comments: paralysed R arm and both legs   Skin:     General: Skin is dry. Coloration: Skin is not jaundiced. Neurological:      Mental Status: She is alert and oriented to person, place, and time. Cranial Nerves: No cranial nerve deficit. Psychiatric:         Mood and Affect: Mood normal.         Thought Content:  Thought content normal.           Medical Decision Making:   I have independently reviewed/ordered the following labs:    CBCwith Differential:   Lab Results   Component Value Date    WBC 6.1 04/01/2021    WBC 6.3 01/28/2020    HGB 12.6 01/28/2020    HGB 10.8 11/08/2019    HCT 39.7 01/28/2020    HCT 32.7 11/08/2019     01/28/2020     11/08/2019    LYMPHOPCT 29 04/01/2021    LYMPHOPCT 6 11/08/2019    MONOPCT 9 11/08/2019    MONOPCT 4 11/07/2019     BMP:  Lab Results   Component Value Date     01/28/2020     11/08/2019    K 4.2 01/28/2020    K 3.8 11/08/2019     01/28/2020     11/08/2019    CO2 25 01/28/2020    CO2 24 11/08/2019    BUN 12 01/28/2020    BUN 9 11/08/2019    CREATININE 0.48 01/28/2020    CREATININE 0.49 11/08/2019    MG 2.0 10/16/2018     Hepatic Function Panel:   Lab Results   Component Value Date    LABALBU 3.8 11/05/2019     No results found for: RPR  No results found for: HIV  No results found for: [facility]  Lab Results   Component Value Date    RBC 4.23 01/28/2020    WBC 6.1 04/01/2021     Lab Results   Component Value Date    CREATININE 0.48 01/28/2020    GLUCOSE 72 01/28/2020   you for allowing us to participate in the care of this patient. Please call with questions. Red Giang MD  - Office: [phone]    Please note that this chart was generated using voice recognition Dragon dictation software. Although every effort was made to ensure the accuracy of this automated transcription, some errors in transcription mayhave occurred.

## 2021-09-03 RX ORDER — ACETAMINOPHEN 500 MG
1000 TABLET ORAL ONCE
Status: CANCELLED | OUTPATIENT
Start: 2021-09-03

## 2021-09-15 ENCOUNTER — HOSPITAL ENCOUNTER (OUTPATIENT)
Dept: ONCOLOGY | Age: 31
Discharge: HOME | End: 2021-09-15
Attending: PSYCHIATRY & NEUROLOGY | Admitting: PSYCHIATRY & NEUROLOGY
Payer: MEDICAID

## 2021-09-15 VITALS
OXYGEN SATURATION: 98 % | RESPIRATION RATE: 18 BRPM | SYSTOLIC BLOOD PRESSURE: 107 MMHG | TEMPERATURE: 98.7 F | HEART RATE: 75 BPM | DIASTOLIC BLOOD PRESSURE: 70 MMHG

## 2021-09-15 DIAGNOSIS — G35 PRIMARY CHRONIC PROGRESSIVE MULTIPLE SCLEROSIS (HCC): Primary | ICD-10-CM

## 2021-09-15 PROCEDURE — 96375 TX/PRO/DX INJ NEW DRUG ADDON: CPT

## 2021-09-15 PROCEDURE — 96366 THER/PROPH/DIAG IV INF ADDON: CPT

## 2021-09-15 PROCEDURE — 2580000003 HC RX 258: Performed by: PSYCHIATRY & NEUROLOGY

## 2021-09-15 PROCEDURE — 6370000000 HC RX 637 (ALT 250 FOR IP): Performed by: PSYCHIATRY & NEUROLOGY

## 2021-09-15 PROCEDURE — 96365 THER/PROPH/DIAG IV INF INIT: CPT

## 2021-09-15 PROCEDURE — 6360000002 HC RX W HCPCS: Performed by: PSYCHIATRY & NEUROLOGY

## 2021-09-15 PROCEDURE — 96374 THER/PROPH/DIAG INJ IV PUSH: CPT

## 2021-09-15 RX ORDER — SODIUM CHLORIDE 9 MG/ML
INJECTION, SOLUTION INTRAVENOUS CONTINUOUS
Status: CANCELLED | OUTPATIENT
Start: 2021-09-29

## 2021-09-15 RX ORDER — HYDROXYZINE HYDROCHLORIDE 25 MG/1
25 TABLET, FILM COATED ORAL PRN
Status: CANCELLED
Start: 2021-09-29

## 2021-09-15 RX ORDER — HYDROXYZINE HYDROCHLORIDE 25 MG/1
25 TABLET, FILM COATED ORAL ONCE
Status: COMPLETED | OUTPATIENT
Start: 2021-09-15 | End: 2021-09-15

## 2021-09-15 RX ORDER — ONDANSETRON 2 MG/ML
4 INJECTION INTRAMUSCULAR; INTRAVENOUS PRN
Status: CANCELLED
Start: 2021-09-29

## 2021-09-15 RX ORDER — METHYLPREDNISOLONE SODIUM SUCCINATE 125 MG/2ML
100 INJECTION, POWDER, LYOPHILIZED, FOR SOLUTION INTRAMUSCULAR; INTRAVENOUS ONCE
Status: CANCELLED | OUTPATIENT
Start: 2021-09-29

## 2021-09-15 RX ORDER — SODIUM CHLORIDE 0.9 % (FLUSH) 0.9 %
5 SYRINGE (ML) INJECTION PRN
Status: CANCELLED | OUTPATIENT
Start: 2021-09-29

## 2021-09-15 RX ORDER — DIPHENHYDRAMINE HYDROCHLORIDE 50 MG/ML
50 INJECTION INTRAMUSCULAR; INTRAVENOUS ONCE
Status: CANCELLED | OUTPATIENT
Start: 2021-09-29 | End: 2021-09-29

## 2021-09-15 RX ORDER — EPINEPHRINE 1 MG/ML
0.3 INJECTION, SOLUTION, CONCENTRATE INTRAVENOUS PRN
Status: CANCELLED | OUTPATIENT
Start: 2021-09-29

## 2021-09-15 RX ORDER — METHYLPREDNISOLONE SODIUM SUCCINATE 125 MG/2ML
100 INJECTION, POWDER, LYOPHILIZED, FOR SOLUTION INTRAMUSCULAR; INTRAVENOUS ONCE
Status: COMPLETED | OUTPATIENT
Start: 2021-09-15 | End: 2021-09-15

## 2021-09-15 RX ORDER — ACETAMINOPHEN 500 MG
1000 TABLET ORAL ONCE
Status: CANCELLED | OUTPATIENT
Start: 2021-09-29

## 2021-09-15 RX ORDER — IBUPROFEN 400 MG/1
600 TABLET ORAL PRN
Status: CANCELLED
Start: 2021-09-29

## 2021-09-15 RX ORDER — SODIUM CHLORIDE 9 MG/ML
INJECTION, SOLUTION INTRAVENOUS CONTINUOUS
Status: DISCONTINUED | OUTPATIENT
Start: 2021-09-15 | End: 2021-09-15 | Stop reason: HOSPADM

## 2021-09-15 RX ORDER — SODIUM CHLORIDE 0.9 % (FLUSH) 0.9 %
10 SYRINGE (ML) INJECTION PRN
Status: CANCELLED | OUTPATIENT
Start: 2021-09-29

## 2021-09-15 RX ORDER — METHYLPREDNISOLONE SODIUM SUCCINATE 125 MG/2ML
125 INJECTION, POWDER, LYOPHILIZED, FOR SOLUTION INTRAMUSCULAR; INTRAVENOUS ONCE
Status: CANCELLED | OUTPATIENT
Start: 2021-09-29 | End: 2021-09-29

## 2021-09-15 RX ORDER — ACETAMINOPHEN 500 MG
1000 TABLET ORAL ONCE
Status: COMPLETED | OUTPATIENT
Start: 2021-09-15 | End: 2021-09-15

## 2021-09-15 RX ORDER — HYDROXYZINE HYDROCHLORIDE 25 MG/1
25 TABLET, FILM COATED ORAL ONCE
Status: CANCELLED
Start: 2021-09-29 | End: 2021-09-29

## 2021-09-15 RX ORDER — HEPARIN SODIUM (PORCINE) LOCK FLUSH IV SOLN 100 UNIT/ML 100 UNIT/ML
500 SOLUTION INTRAVENOUS PRN
Status: CANCELLED | OUTPATIENT
Start: 2021-09-29

## 2021-09-15 RX ADMIN — ACETAMINOPHEN 1000 MG: 500 TABLET ORAL at 10:25

## 2021-09-15 RX ADMIN — HYDROXYZINE HYDROCHLORIDE 25 MG: 25 TABLET ORAL at 10:25

## 2021-09-15 RX ADMIN — METHYLPREDNISOLONE SODIUM SUCCINATE 100 MG: 125 INJECTION, POWDER, FOR SOLUTION INTRAMUSCULAR; INTRAVENOUS at 10:24

## 2021-09-15 RX ADMIN — OCRELIZUMAB 300 MG: 300 INJECTION INTRAVENOUS at 10:58

## 2021-09-15 RX ADMIN — SODIUM CHLORIDE: 9 INJECTION, SOLUTION INTRAVENOUS at 10:58

## 2021-09-15 ASSESSMENT — PAIN SCALES - GENERAL: PAINLEVEL_OUTOF10: 0

## 2021-09-21 ENCOUNTER — TELEPHONE (OUTPATIENT)
Dept: NEUROLOGY | Age: 31
End: 2021-09-21

## 2021-09-27 NOTE — TELEPHONE ENCOUNTER
Form has been started and waiting to discuss and finalize with Dr. Jessica Donald when he is in the office this week.

## 2021-09-29 ENCOUNTER — HOSPITAL ENCOUNTER (OUTPATIENT)
Dept: ONCOLOGY | Age: 31
Discharge: HOME | End: 2021-09-29
Attending: PSYCHIATRY & NEUROLOGY | Admitting: PSYCHIATRY & NEUROLOGY
Payer: COMMERCIAL

## 2021-09-29 VITALS
TEMPERATURE: 98.7 F | SYSTOLIC BLOOD PRESSURE: 99 MMHG | RESPIRATION RATE: 16 BRPM | DIASTOLIC BLOOD PRESSURE: 63 MMHG | OXYGEN SATURATION: 100 % | HEART RATE: 72 BPM

## 2021-09-29 DIAGNOSIS — G35 PRIMARY CHRONIC PROGRESSIVE MULTIPLE SCLEROSIS (HCC): Primary | ICD-10-CM

## 2021-09-29 PROCEDURE — 6360000002 HC RX W HCPCS: Performed by: PSYCHIATRY & NEUROLOGY

## 2021-09-29 PROCEDURE — 96365 THER/PROPH/DIAG IV INF INIT: CPT

## 2021-09-29 PROCEDURE — 96366 THER/PROPH/DIAG IV INF ADDON: CPT

## 2021-09-29 PROCEDURE — 6370000000 HC RX 637 (ALT 250 FOR IP): Performed by: PSYCHIATRY & NEUROLOGY

## 2021-09-29 PROCEDURE — 2580000003 HC RX 258: Performed by: PSYCHIATRY & NEUROLOGY

## 2021-09-29 RX ORDER — EPINEPHRINE 1 MG/ML
0.3 INJECTION, SOLUTION, CONCENTRATE INTRAVENOUS PRN
OUTPATIENT
Start: 2022-03-16

## 2021-09-29 RX ORDER — METHYLPREDNISOLONE SODIUM SUCCINATE 125 MG/2ML
100 INJECTION, POWDER, LYOPHILIZED, FOR SOLUTION INTRAMUSCULAR; INTRAVENOUS ONCE
Status: CANCELLED | OUTPATIENT
Start: 2022-03-16

## 2021-09-29 RX ORDER — METHYLPREDNISOLONE SODIUM SUCCINATE 125 MG/2ML
125 INJECTION, POWDER, LYOPHILIZED, FOR SOLUTION INTRAMUSCULAR; INTRAVENOUS ONCE
OUTPATIENT
Start: 2022-03-16 | End: 2022-03-16

## 2021-09-29 RX ORDER — SODIUM CHLORIDE 9 MG/ML
INJECTION, SOLUTION INTRAVENOUS CONTINUOUS
OUTPATIENT
Start: 2022-03-16

## 2021-09-29 RX ORDER — IBUPROFEN 400 MG/1
600 TABLET ORAL PRN
Start: 2022-03-16

## 2021-09-29 RX ORDER — HEPARIN SODIUM (PORCINE) LOCK FLUSH IV SOLN 100 UNIT/ML 100 UNIT/ML
500 SOLUTION INTRAVENOUS PRN
OUTPATIENT
Start: 2022-03-16

## 2021-09-29 RX ORDER — HYDROXYZINE HYDROCHLORIDE 25 MG/1
25 TABLET, FILM COATED ORAL ONCE
Status: COMPLETED | OUTPATIENT
Start: 2021-09-29 | End: 2021-09-29

## 2021-09-29 RX ORDER — METHYLPREDNISOLONE SODIUM SUCCINATE 125 MG/2ML
100 INJECTION, POWDER, LYOPHILIZED, FOR SOLUTION INTRAMUSCULAR; INTRAVENOUS ONCE
Status: COMPLETED | OUTPATIENT
Start: 2021-09-29 | End: 2021-09-29

## 2021-09-29 RX ORDER — ACETAMINOPHEN 500 MG
1000 TABLET ORAL ONCE
Status: CANCELLED | OUTPATIENT
Start: 2022-03-16

## 2021-09-29 RX ORDER — SODIUM CHLORIDE 9 MG/ML
INJECTION, SOLUTION INTRAVENOUS CONTINUOUS
Status: CANCELLED | OUTPATIENT
Start: 2022-03-16

## 2021-09-29 RX ORDER — SODIUM CHLORIDE 0.9 % (FLUSH) 0.9 %
5 SYRINGE (ML) INJECTION PRN
OUTPATIENT
Start: 2022-03-16

## 2021-09-29 RX ORDER — SODIUM CHLORIDE 9 MG/ML
INJECTION, SOLUTION INTRAVENOUS CONTINUOUS
Status: DISCONTINUED | OUTPATIENT
Start: 2021-09-29 | End: 2021-09-29 | Stop reason: HOSPADM

## 2021-09-29 RX ORDER — ONDANSETRON 2 MG/ML
4 INJECTION INTRAMUSCULAR; INTRAVENOUS PRN
Start: 2022-03-16

## 2021-09-29 RX ORDER — HYDROXYZINE HYDROCHLORIDE 25 MG/1
25 TABLET, FILM COATED ORAL PRN
Start: 2022-03-16

## 2021-09-29 RX ORDER — ACETAMINOPHEN 500 MG
1000 TABLET ORAL ONCE
Status: COMPLETED | OUTPATIENT
Start: 2021-09-29 | End: 2021-09-29

## 2021-09-29 RX ORDER — DIPHENHYDRAMINE HYDROCHLORIDE 50 MG/ML
50 INJECTION INTRAMUSCULAR; INTRAVENOUS ONCE
OUTPATIENT
Start: 2022-03-16 | End: 2022-03-16

## 2021-09-29 RX ORDER — SODIUM CHLORIDE 0.9 % (FLUSH) 0.9 %
10 SYRINGE (ML) INJECTION PRN
OUTPATIENT
Start: 2022-03-16

## 2021-09-29 RX ORDER — HYDROXYZINE HYDROCHLORIDE 25 MG/1
25 TABLET, FILM COATED ORAL ONCE
Status: CANCELLED
Start: 2022-03-16 | End: 2022-03-16

## 2021-09-29 RX ADMIN — ACETAMINOPHEN 1000 MG: 500 TABLET ORAL at 11:09

## 2021-09-29 RX ADMIN — HYDROXYZINE HYDROCHLORIDE 25 MG: 25 TABLET ORAL at 11:10

## 2021-09-29 RX ADMIN — OCRELIZUMAB 300 MG: 300 INJECTION INTRAVENOUS at 12:02

## 2021-09-29 RX ADMIN — METHYLPREDNISOLONE SODIUM SUCCINATE 100 MG: 125 INJECTION, POWDER, FOR SOLUTION INTRAMUSCULAR; INTRAVENOUS at 11:11

## 2021-09-29 RX ADMIN — SODIUM CHLORIDE: 9 INJECTION, SOLUTION INTRAVENOUS at 11:45

## 2021-09-29 ASSESSMENT — PAIN SCALES - GENERAL
PAINLEVEL_OUTOF10: 0

## 2021-09-29 NOTE — FLOWSHEET NOTE
Ocrevus infusion completed ,tolerated well. Denies any complaints. No distress noted. Ambulance sevice notified and will  patient in approximately an hour.

## 2021-10-01 NOTE — TELEPHONE ENCOUNTER
Form signed by the doctor. Call placed to patient's mother and a message left on her VM asking for a return call. Mother wanted to  the form. Will inform her that form will be at the  for . Before I could complete this note patient's mother called the office back and this information was given. Mother verbally stated her understanding.

## 2022-03-02 ENCOUNTER — HOSPITAL ENCOUNTER (OUTPATIENT)
Dept: MRI IMAGING | Age: 32
Discharge: HOME | End: 2022-03-04
Payer: MEDICAID

## 2022-03-02 ENCOUNTER — HOSPITAL ENCOUNTER (OUTPATIENT)
Dept: ONCOLOGY | Age: 32
Discharge: HOME | End: 2022-03-02

## 2022-03-02 DIAGNOSIS — R76.8 JC VIRUS ANTIBODY POSITIVE: ICD-10-CM

## 2022-03-02 DIAGNOSIS — G35 PRIMARY CHRONIC PROGRESSIVE MULTIPLE SCLEROSIS (HCC): ICD-10-CM

## 2022-03-02 DIAGNOSIS — G82.20 LOWER PARAPLEGIA (HCC): ICD-10-CM

## 2022-03-02 DIAGNOSIS — D84.9 IMMUNOSUPPRESSED STATUS (HCC): ICD-10-CM

## 2022-03-02 PROCEDURE — 72157 MRI CHEST SPINE W/O & W/DYE: CPT

## 2022-03-02 PROCEDURE — 2580000003 HC RX 258: Performed by: PSYCHIATRY & NEUROLOGY

## 2022-03-02 PROCEDURE — 70553 MRI BRAIN STEM W/O & W/DYE: CPT

## 2022-03-02 PROCEDURE — A9579 GAD-BASE MR CONTRAST NOS,1ML: HCPCS | Performed by: PSYCHIATRY & NEUROLOGY

## 2022-03-02 PROCEDURE — 6360000004 HC RX CONTRAST MEDICATION: Performed by: PSYCHIATRY & NEUROLOGY

## 2022-03-02 PROCEDURE — 72156 MRI NECK SPINE W/O & W/DYE: CPT

## 2022-03-02 RX ORDER — SODIUM CHLORIDE 0.9 % (FLUSH) 0.9 %
10 SYRINGE (ML) INJECTION PRN
Status: DISCONTINUED | OUTPATIENT
Start: 2022-03-02 | End: 2022-03-05 | Stop reason: HOSPADM

## 2022-03-02 RX ADMIN — SODIUM CHLORIDE, PRESERVATIVE FREE 10 ML: 5 INJECTION INTRAVENOUS at 20:36

## 2022-03-02 RX ADMIN — GADOTERIDOL 10 ML: 279.3 INJECTION, SOLUTION INTRAVENOUS at 20:36

## 2022-03-18 ENCOUNTER — TELEPHONE (OUTPATIENT)
Dept: NEUROLOGY | Age: 32
End: 2022-03-18

## 2022-03-18 NOTE — TELEPHONE ENCOUNTER
Patient's mother called requesting a referral for Ghazal to be seen and treated at [address] [place] [address],[address] that includes her MS diagnosis. She stated that the referral can be faxed to [phone].

## 2022-03-18 NOTE — TELEPHONE ENCOUNTER
May be patient's mom is not aware that Dr. Bettina Reese does see multiple sclerosis patients. Please let the patient know that patient should see Dr. Bettina Reese in the clinic to follow-up on primary progressive multiple sclerosis.       Thank you.   -dr. Linda Carrillo

## 2022-03-21 ENCOUNTER — HOSPITAL ENCOUNTER (OUTPATIENT)
Dept: ONCOLOGY | Age: 32
Discharge: HOME | End: 2022-03-21
Attending: PSYCHIATRY & NEUROLOGY | Admitting: PSYCHIATRY & NEUROLOGY
Payer: MEDICAID

## 2022-03-21 VITALS
SYSTOLIC BLOOD PRESSURE: 93 MMHG | OXYGEN SATURATION: 96 % | TEMPERATURE: 97.7 F | DIASTOLIC BLOOD PRESSURE: 62 MMHG | RESPIRATION RATE: 16 BRPM | HEART RATE: 102 BPM

## 2022-03-21 DIAGNOSIS — G35 PRIMARY CHRONIC PROGRESSIVE MULTIPLE SCLEROSIS (HCC): Primary | ICD-10-CM

## 2022-03-21 PROCEDURE — 96366 THER/PROPH/DIAG IV INF ADDON: CPT

## 2022-03-21 PROCEDURE — 6370000000 HC RX 637 (ALT 250 FOR IP): Performed by: PSYCHIATRY & NEUROLOGY

## 2022-03-21 PROCEDURE — 6360000002 HC RX W HCPCS: Performed by: PSYCHIATRY & NEUROLOGY

## 2022-03-21 PROCEDURE — 76937 US GUIDE VASCULAR ACCESS: CPT

## 2022-03-21 PROCEDURE — 96365 THER/PROPH/DIAG IV INF INIT: CPT

## 2022-03-21 PROCEDURE — 96375 TX/PRO/DX INJ NEW DRUG ADDON: CPT

## 2022-03-21 PROCEDURE — 2580000003 HC RX 258: Performed by: PSYCHIATRY & NEUROLOGY

## 2022-03-21 RX ORDER — SODIUM CHLORIDE 0.9 % (FLUSH) 0.9 %
10 SYRINGE (ML) INJECTION PRN
Status: CANCELLED | OUTPATIENT
Start: 2022-08-29

## 2022-03-21 RX ORDER — DIPHENHYDRAMINE HYDROCHLORIDE 50 MG/ML
50 INJECTION INTRAMUSCULAR; INTRAVENOUS ONCE
Status: CANCELLED | OUTPATIENT
Start: 2022-08-29 | End: 2022-08-29

## 2022-03-21 RX ORDER — ONDANSETRON 2 MG/ML
4 INJECTION INTRAMUSCULAR; INTRAVENOUS PRN
Status: CANCELLED
Start: 2022-08-29

## 2022-03-21 RX ORDER — HEPARIN SODIUM (PORCINE) LOCK FLUSH IV SOLN 100 UNIT/ML 100 UNIT/ML
500 SOLUTION INTRAVENOUS PRN
Status: CANCELLED | OUTPATIENT
Start: 2022-08-29

## 2022-03-21 RX ORDER — METHYLPREDNISOLONE SODIUM SUCCINATE 125 MG/2ML
100 INJECTION, POWDER, LYOPHILIZED, FOR SOLUTION INTRAMUSCULAR; INTRAVENOUS ONCE
Status: CANCELLED | OUTPATIENT
Start: 2022-08-29

## 2022-03-21 RX ORDER — SODIUM CHLORIDE 0.9 % (FLUSH) 0.9 %
5 SYRINGE (ML) INJECTION PRN
Status: CANCELLED | OUTPATIENT
Start: 2022-08-29

## 2022-03-21 RX ORDER — SODIUM CHLORIDE 9 MG/ML
INJECTION, SOLUTION INTRAVENOUS CONTINUOUS
Status: DISCONTINUED | OUTPATIENT
Start: 2022-03-21 | End: 2022-03-21 | Stop reason: HOSPADM

## 2022-03-21 RX ORDER — SODIUM CHLORIDE 9 MG/ML
INJECTION, SOLUTION INTRAVENOUS CONTINUOUS
Status: CANCELLED | OUTPATIENT
Start: 2022-08-29

## 2022-03-21 RX ORDER — EPINEPHRINE 1 MG/ML
0.3 INJECTION, SOLUTION, CONCENTRATE INTRAVENOUS PRN
Status: CANCELLED | OUTPATIENT
Start: 2022-08-29

## 2022-03-21 RX ORDER — IBUPROFEN 400 MG/1
600 TABLET ORAL PRN
Status: CANCELLED
Start: 2022-08-29

## 2022-03-21 RX ORDER — METHYLPREDNISOLONE SODIUM SUCCINATE 125 MG/2ML
125 INJECTION, POWDER, LYOPHILIZED, FOR SOLUTION INTRAMUSCULAR; INTRAVENOUS ONCE
Status: CANCELLED | OUTPATIENT
Start: 2022-08-29 | End: 2022-08-29

## 2022-03-21 RX ORDER — HYDROXYZINE HYDROCHLORIDE 25 MG/1
25 TABLET, FILM COATED ORAL ONCE
Status: CANCELLED
Start: 2022-08-29 | End: 2022-08-29

## 2022-03-21 RX ORDER — HYDROXYZINE HYDROCHLORIDE 25 MG/1
25 TABLET, FILM COATED ORAL ONCE
Status: COMPLETED | OUTPATIENT
Start: 2022-03-21 | End: 2022-03-21

## 2022-03-21 RX ORDER — HYDROXYZINE HYDROCHLORIDE 25 MG/1
25 TABLET, FILM COATED ORAL PRN
Status: CANCELLED
Start: 2022-08-29

## 2022-03-21 RX ORDER — ACETAMINOPHEN 500 MG
1000 TABLET ORAL ONCE
Status: COMPLETED | OUTPATIENT
Start: 2022-03-21 | End: 2022-03-21

## 2022-03-21 RX ORDER — ACETAMINOPHEN 500 MG
1000 TABLET ORAL ONCE
Status: CANCELLED | OUTPATIENT
Start: 2022-08-29

## 2022-03-21 RX ORDER — METHYLPREDNISOLONE SODIUM SUCCINATE 125 MG/2ML
100 INJECTION, POWDER, LYOPHILIZED, FOR SOLUTION INTRAMUSCULAR; INTRAVENOUS ONCE
Status: COMPLETED | OUTPATIENT
Start: 2022-03-21 | End: 2022-03-21

## 2022-03-21 RX ADMIN — HYDROXYZINE HYDROCHLORIDE 25 MG: 25 TABLET ORAL at 10:27

## 2022-03-21 RX ADMIN — ACETAMINOPHEN 1000 MG: 500 TABLET ORAL at 10:27

## 2022-03-21 RX ADMIN — METHYLPREDNISOLONE SODIUM SUCCINATE 100 MG: 125 INJECTION, POWDER, FOR SOLUTION INTRAMUSCULAR; INTRAVENOUS at 10:56

## 2022-03-21 RX ADMIN — SODIUM CHLORIDE: 9 INJECTION, SOLUTION INTRAVENOUS at 11:28

## 2022-03-21 RX ADMIN — OCRELIZUMAB 600 MG: 300 INJECTION INTRAVENOUS at 11:28

## 2022-03-21 ASSESSMENT — PAIN SCALES - GENERAL: PAINLEVEL_OUTOF10: 0

## 2022-03-21 NOTE — TELEPHONE ENCOUNTER
Pt's mother called back in. She stated that it was not in reference to you leaving, it was for a program that they were having at the [facility]. They need a referral and her last MRI. She is ok to be scheduled with one of the other providers.

## 2022-03-23 ENCOUNTER — TELEPHONE (OUTPATIENT)
Dept: NEUROLOGY | Age: 32
End: 2022-03-23

## 2022-03-23 NOTE — TELEPHONE ENCOUNTER
Courtney Welsh called in for her MRI results. Can you please review. I martinay reviewed them with her but told her you would look at the results and let her know.

## 2022-03-23 NOTE — LETTER
[facility]  [address] [facility] [address]  Phone: [phone]  Fax: [phone]    Mora Bain MD        March 24, 2022     Patient: Raiza Prado   YOB: 1990   Date of Visit: 3/23/2022       To Whom it May Concern:    Fredo Falk was seen in my clinic in August 2021. She has diagnosis of multiple sclerosis. This letter is being written at patient's request.      If you have any questions or concerns, please don't hesitate to call.     Sincerely,         Mora Bain MD

## 2022-03-24 NOTE — TELEPHONE ENCOUNTER
Please let the patient know that MRI studies are not demonstrating active lesions on comparison to 2019 MRI studies as stated below. Thank you.   -dr. Drew Preston      MRI brain (w/wo) 3/2/2022: Compared with 11/5/2019 brain MRI; stable appearance with moderately severe periventricular white matter changes consistent with multiple sclerosis; no enhancing lesions; no active demyelinating lesions. MRI cervical spine (w/wo) 3/2/2022: Compared with 11/6/2019 cervical spine MRI; several short segment lesions in the cord, without significant cord atrophy; no enhancing lesions. MRI thoracic spine (w/wo) 3/2/2022: Compared with 11/20/2019 thoracic spine MRI; thoracic cord is grossly normal in size.

## 2022-03-24 NOTE — TELEPHONE ENCOUNTER
I called Aric Cazares. She would like a statement that she has MS. We can mail this to her if Dr. Hilario Payne is agreeable.

## 2022-03-31 DIAGNOSIS — D84.9 IMMUNOSUPPRESSED STATUS (HCC): ICD-10-CM

## 2022-03-31 DIAGNOSIS — G82.20 LOWER PARAPLEGIA (HCC): ICD-10-CM

## 2022-03-31 DIAGNOSIS — R76.8 JC VIRUS ANTIBODY POSITIVE: ICD-10-CM

## 2022-03-31 DIAGNOSIS — G35 PRIMARY CHRONIC PROGRESSIVE MULTIPLE SCLEROSIS (HCC): Primary | ICD-10-CM

## 2022-07-15 ENCOUNTER — APPOINTMENT (OUTPATIENT)
Dept: GENERAL RADIOLOGY | Age: 32
End: 2022-07-15
Payer: MEDICAID

## 2022-07-15 ENCOUNTER — HOSPITAL ENCOUNTER (EMERGENCY)
Age: 32
Discharge: HOME | End: 2022-07-16
Attending: EMERGENCY MEDICINE
Payer: MEDICAID

## 2022-07-15 DIAGNOSIS — I95.9 HYPOTENSION, UNSPECIFIED HYPOTENSION TYPE: Primary | ICD-10-CM

## 2022-07-15 LAB
ABSOLUTE EOS #: 0.41 K/UL (ref 0–0.44)
ABSOLUTE IMMATURE GRANULOCYTE: 0.01 K/UL (ref 0–0.3)
ABSOLUTE LYMPH #: 1.26 K/UL (ref 1.1–3.7)
ABSOLUTE MONO #: 0.51 K/UL (ref 0.1–1.2)
ANION GAP SERPL CALCULATED.3IONS-SCNC: 7 MMOL/L (ref 9–17)
BASOPHILS # BLD: 1 % (ref 0–2)
BASOPHILS ABSOLUTE: 0.04 K/UL (ref 0–0.2)
BUN BLDV-MCNC: 10 MG/DL (ref 6–20)
BUN/CREAT BLD: 20 (ref 9–20)
CALCIUM SERPL-MCNC: 8.8 MG/DL (ref 8.6–10.4)
CHLORIDE BLD-SCNC: 105 MMOL/L (ref 98–107)
CO2: 27 MMOL/L (ref 20–31)
CREAT SERPL-MCNC: 0.49 MG/DL (ref 0.5–0.9)
EOSINOPHILS RELATIVE PERCENT: 8 % (ref 1–4)
GFR AFRICAN AMERICAN: >60 ML/MIN
GFR NON-AFRICAN AMERICAN: >60 ML/MIN
GFR SERPL CREATININE-BSD FRML MDRD: ABNORMAL ML/MIN/{1.73_M2}
GLUCOSE BLD-MCNC: 90 MG/DL (ref 70–99)
HCT VFR BLD CALC: 40.4 % (ref 36.3–47.1)
HEMOGLOBIN: 12.5 G/DL (ref 11.9–15.1)
IMMATURE GRANULOCYTES: 0 %
LYMPHOCYTES # BLD: 25 % (ref 24–43)
MCH RBC QN AUTO: 28.7 PG (ref 25.2–33.5)
MCHC RBC AUTO-ENTMCNC: 30.9 G/DL (ref 28.4–34.8)
MCV RBC AUTO: 92.7 FL (ref 82.6–102.9)
MONOCYTES # BLD: 10 % (ref 3–12)
NRBC AUTOMATED: 0 PER 100 WBC
PDW BLD-RTO: 13.4 % (ref 11.8–14.4)
PLATELET # BLD: 228 K/UL (ref 138–453)
PMV BLD AUTO: 10.4 FL (ref 8.1–13.5)
POTASSIUM SERPL-SCNC: 4.1 MMOL/L (ref 3.7–5.3)
RBC # BLD: 4.36 M/UL (ref 3.95–5.11)
SEG NEUTROPHILS: 56 % (ref 36–65)
SEGMENTED NEUTROPHILS ABSOLUTE COUNT: 2.86 K/UL (ref 1.5–8.1)
SODIUM BLD-SCNC: 139 MMOL/L (ref 135–144)
WBC # BLD: 5.1 K/UL (ref 3.5–11.3)

## 2022-07-15 PROCEDURE — 71045 X-RAY EXAM CHEST 1 VIEW: CPT

## 2022-07-15 PROCEDURE — 2580000003 HC RX 258: Performed by: EMERGENCY MEDICINE

## 2022-07-15 PROCEDURE — 85025 COMPLETE CBC W/AUTO DIFF WBC: CPT

## 2022-07-15 PROCEDURE — 80048 BASIC METABOLIC PNL TOTAL CA: CPT

## 2022-07-15 PROCEDURE — 99284 EMERGENCY DEPT VISIT MOD MDM: CPT

## 2022-07-15 RX ORDER — 0.9 % SODIUM CHLORIDE 0.9 %
500 INTRAVENOUS SOLUTION INTRAVENOUS ONCE
Status: COMPLETED | OUTPATIENT
Start: 2022-07-15 | End: 2022-07-15

## 2022-07-15 RX ADMIN — SODIUM CHLORIDE 500 ML: 9 INJECTION, SOLUTION INTRAVENOUS at 21:38

## 2022-07-16 VITALS
RESPIRATION RATE: 18 BRPM | OXYGEN SATURATION: 100 % | TEMPERATURE: 97.1 F | DIASTOLIC BLOOD PRESSURE: 92 MMHG | WEIGHT: 90 LBS | HEIGHT: 69 IN | HEART RATE: 100 BPM | SYSTOLIC BLOOD PRESSURE: 107 MMHG | BODY MASS INDEX: 13.33 KG/M2

## 2022-07-16 ASSESSMENT — ENCOUNTER SYMPTOMS
SHORTNESS OF BREATH: 0
ABDOMINAL DISTENTION: 0
EYES NEGATIVE: 1
DIARRHEA: 0
CONSTIPATION: 0
SINUS PAIN: 0
VOMITING: 0
APNEA: 0
CHEST TIGHTNESS: 0
COLOR CHANGE: 0

## 2022-07-16 NOTE — ED PROVIDER NOTES
EMERGENCY DEPARTMENT ENCOUNTER    Pt Name: Julian Augustin  MRN: [id number]  [place] 1990  Date of evaluation: 7/16/22  CHIEF COMPLAINT       Chief Complaint   Patient presents with    Hypotension     Home health got a reading 79/58     HISTORY OF PRESENT ILLNESS   41-year-old female presents emergency room after low blood pressure reading at home. Patient has a nurse come out to the house on occasion for vital checks and blood pressure was low there. Patient has history of MS. She is not currently on any medications. She does get an injection (ocrevus) for her MS every 6 weeks. She denies any nausea or vomiting. Patient has BMI of 13.29 and reports that she did have some increased weight loss during her recent hospitalization several months ago. She is always had a low BMI. Patient reports normal blood pressure for her is around 120/80. Patient feels some minor lightheadedness. REVIEW OF SYSTEMS     Review of Systems   Constitutional:  Negative for activity change, chills and diaphoresis. HENT:  Negative for congestion, sinus pain and tinnitus. Eyes: Negative. Respiratory:  Negative for apnea, chest tightness and shortness of breath. Gastrointestinal:  Negative for abdominal distention, constipation, diarrhea and vomiting. Genitourinary:  Negative for difficulty urinating and frequency. Musculoskeletal:  Negative for arthralgias and myalgias. Skin:  Negative for color change and rash. Neurological:  Positive for light-headedness. Negative for dizziness. Hematological: Negative. Psychiatric/Behavioral: Negative.        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 11/14/2019    Marijuana smoker, continuous 11/5/2019    MS (multiple sclerosis) ([facility]     [facility]     Primary chronic progressive multiple sclerosis ([facility] 11/14/2019    Rash in adult 11/5/2019     Past Problem List  Patient Active Problem List   Diagnosis Code    Exacerbation of multiple sclerosis ([facility] G35    Marijuana smoker, continuous F12.90    Rash in adult R21    Acquired central hypothyroidism E03.8    Primary chronic progressive multiple sclerosis ([facility] G35    Lower paraplegia ([facility] G82.20    Encounter for medication monitoring Z51.81    OUMOU virus antibody positive R76.8    Family history of breast cancer Z80.3    Multiple sclerosis, primary chronic progressive ([facility] G35    Multiple sclerosis ([facility] G35     SURGICAL HISTORY     No past surgical history on file. CURRENT MEDICATIONS       Current Discharge Medication List        CONTINUE these medications which have NOT CHANGED    Details   gabapentin (NEURONTIN) 100 MG capsule TAKE ONE CAP TID  Qty: 90 capsule, Refills: 3    Comments: TAKE ONE CAP TID  Associated Diagnoses: Primary chronic progressive multiple sclerosis ([facility] Lower paraplegia ([facility]  Pins and needles sensation      vitamin D3 (CHOLECALCIFEROL) 25 MCG (1000 UT) TABS tablet Once daily  Qty: 30 tablet, Refills: 3    Associated Diagnoses: Vitamin D deficiency      ondansetron (ZOFRAN) 4 MG tablet       potassium chloride (KLOR-CON M) 20 MEQ extended release tablet       ipratropium-albuterol (DUONEB) 0.5-2.5 (3) MG/3ML SOLN nebulizer solution       fluticasone (FLONASE) 50 MCG/ACT nasal spray       neomycin-polymyxin-hydrocortisone (CORTISPORIN) 3.5-87667-2 otic solution       Multiple Vitamins-Minerals (MULTIVITAMIN PO) Take by mouth daily      sennosides-docusate sodium (SENOKOT-S) 8.6-50 MG tablet Take 1 tablet by mouth 2 times daily      ibuprofen (ADVIL;MOTRIN) 400 MG tablet Take 400 mg by mouth every 6 hours as needed for Pain      acetaminophen (TYLENOL) 650 MG extended release tablet Take 650 alert and oriented to person, place, and time. MEDICAL DECISION MAKING:     Alert oriented nondistressed 73-year-old female presents emergency room for low blood pressure at home. Here in the ED patient has blood pressure in the low 100s to 90s over 50s to 60s. Blood pressure is mildly low however patient has underweight BMI and significant muscle atrophy; hypotension may be related to other underlying factors. Patient observed here in the ED for several hours blood pressure remained stable. Patient has normal white count. History is not highly suspicious for infectious etiology. Patient is well and nontoxic-appearing. I have low suspicion for malignant or emergent process. I updated patient results here in the ED. disposition and plan were reviewed with the patient. Patient is aware that ED testing may be falsely reassuring and patient understands if symptoms worsen reassessment may be necessary. CRITICAL CARE:       PROCEDURES:    Procedures    DIAGNOSTIC RESULTS   EKG:All EKG's are interpreted by the Emergency Department Physician who either signs or Co-signs this chart in the absence of a cardiologist.        RADIOLOGY:All plain film, CT, MRI, and formal ultrasound images (except ED bedside ultrasound) are read by the radiologist, see reports below, unless otherwisenoted in MDM or here. XR CHEST PORTABLE   Final Result   No acute process. LABS: All lab results were reviewed by myself, and all abnormals are listed below.   Labs Reviewed   BASIC METABOLIC PANEL - Abnormal; Notable for the following components:       Result Value    CREATININE 0.49 (*)     Anion Gap 7 (*)     All other components within normal limits   CBC WITH AUTO DIFFERENTIAL - Abnormal; Notable for the following components:    Eosinophils % 8 (*)     All other components within normal limits   URINALYSIS WITH REFLEX TO CULTURE       EMERGENCY DEPARTMENTCOURSE:         Vitals:    Vitals:    07/15/22 2245 07/15/22 2341 07/15/22 2345 07/16/22 0015   BP: 95/60 107/65 (!) 125/91 104/66   Pulse:       Resp:       Temp:       TempSrc:       SpO2:       Weight:       Height:           The patient was given the following medications while in the emergency department:  Orders Placed This Encounter   Medications    0.9 % sodium chloride bolus     CONSULTS:  None    FINAL IMPRESSION      1. Hypotension, unspecified hypotension type          DISPOSITION/PLAN   DISPOSITION Decision To Discharge 07/16/2022 12:29:13 AM      PATIENT REFERRED TO:  No follow-up provider specified. DISCHARGE MEDICATIONS:  Current Discharge Medication List        Mary Sarmiento MD  Attending Emergency Physician      Care during this encounter was due to an unprecedented national emergency due to COVID-19.              Tucker Ryan MD  07/16/22 8222

## 2022-07-16 NOTE — DISCHARGE INSTRUCTIONS
Make sure you are getting adequate fluids and oral intake at home. I recommend follow-up with your primary care provider.

## 2022-08-01 ENCOUNTER — HOSPITAL ENCOUNTER (EMERGENCY)
Age: 32
Discharge: HOME | End: 2022-08-01
Attending: EMERGENCY MEDICINE
Payer: MEDICAID

## 2022-08-01 VITALS
TEMPERATURE: 98.5 F | BODY MASS INDEX: 14.81 KG/M2 | SYSTOLIC BLOOD PRESSURE: 99 MMHG | WEIGHT: 100 LBS | HEART RATE: 77 BPM | RESPIRATION RATE: 14 BRPM | DIASTOLIC BLOOD PRESSURE: 68 MMHG | HEIGHT: 69 IN | OXYGEN SATURATION: 100 %

## 2022-08-01 DIAGNOSIS — Z65.9 POOR SOCIAL SITUATION: Primary | ICD-10-CM

## 2022-08-01 PROCEDURE — 99283 EMERGENCY DEPT VISIT LOW MDM: CPT

## 2022-08-01 ASSESSMENT — ENCOUNTER SYMPTOMS
DIARRHEA: 0
CONSTIPATION: 0
VOMITING: 0
EYE DISCHARGE: 0
SHORTNESS OF BREATH: 0
COUGH: 0
COLOR CHANGE: 0
ABDOMINAL PAIN: 0
EYE REDNESS: 0
FACIAL SWELLING: 0

## 2022-08-01 NOTE — ED NOTES
[facility] contacted [facility] rep and was informed that patient is current at [facility] and is okay to return there as she is a Medicaid bed hold. Patient supposedly left with a cousin to attend a cookout and was to return last night but did not come back, per [facility]. They indicated that it is okay to cab patient back to the facility.      [facility] [address],  [address]   [phone]

## 2022-08-01 NOTE — ED PROVIDER NOTES
[address] [place] [address] ED  EMERGENCY DEPARTMENT ENCOUNTER      Pt Name: Ben Morillo  MRN: [id number]  [place] 1990  Date of evaluation: 8/1/2022  Provider: Asia Maravilla MD    CHIEF COMPLAINT       Chief Complaint   Patient presents with    Extremity Weakness     Hx of MS. HISTORY OF PRESENT ILLNESS  (Location/Symptom, Timing/Onset, Context/Setting, Quality, Duration, Modifying Factors, Severity.)   Ben Morillo is a 27 y.o. female who presents to the emergency department because she needs a ride back to her nursing home. A friend took her to a hotel last night and she spent the night there but when she woke up in the morning the friend was gone and she did not have transportation back to the nursing home. Ambulance was called and she was transported here. She has no complaints. Nursing Notes were reviewed.     ALLERGIES     Ativan [lorazepam], Benadryl [diphenhydramine], and Chocolate    CURRENT MEDICATIONS       Previous Medications    ACETAMINOPHEN (TYLENOL) 650 MG EXTENDED RELEASE TABLET    Take 650 mg by mouth every 8 hours as needed for Pain    BISACODYL (DULCOLAX) 10 MG SUPPOSITORY    Place 10 mg rectally daily    FERROUS SULFATE 325 (65 FE) MG TABLET    Take 325 mg by mouth daily (with breakfast)    FLUTICASONE (FLONASE) 50 MCG/ACT NASAL SPRAY        GABAPENTIN (NEURONTIN) 100 MG CAPSULE    TAKE ONE CAP TID    IBUPROFEN (ADVIL;MOTRIN) 400 MG TABLET    Take 400 mg by mouth every 6 hours as needed for Pain    IPRATROPIUM-ALBUTEROL (DUONEB) 0.5-2.5 (3) MG/3ML SOLN NEBULIZER SOLUTION        LEVOTHYROXINE (SYNTHROID) 50 MCG TABLET    Take 1 tablet by mouth Daily    MAGNESIUM HYDROXIDE (MILK OF MAGNESIA PO)    Take 30 mLs by mouth as needed    MULTIPLE VITAMINS-MINERALS (MULTIVITAMIN PO)    Take by mouth daily    NEOMYCIN-POLYMYXIN-HYDROCORTISONE (CORTISPORIN) 3.5-96991-3 OTIC SOLUTION        ONDANSETRON (ZOFRAN) 4 MG TABLET        ONDANSETRON (ZOFRAN-ODT) 4 MG DISINTEGRATING TABLET    Take 1 tablet by mouth every 6 hours as needed for Nausea    POTASSIUM CHLORIDE (KLOR-CON M) 20 MEQ EXTENDED RELEASE TABLET        SENNOSIDES-DOCUSATE SODIUM (SENOKOT-S) 8.6-50 MG TABLET    Take 1 tablet by mouth 2 times daily    VITAMIN D3 (CHOLECALCIFEROL) 25 MCG (1000 UT) TABS TABLET    Once daily       PAST MEDICAL HISTORY         Diagnosis Date    Acquired central hypothyroidism 2019    Arthritis     Asthma     Encounter for medication monitoring 2019    Exacerbation of multiple sclerosis ([facility] 2018    Family history of breast cancer 3/7/2020    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 No pertinent surgical history. FAMILY HISTORY           Problem Relation Age of Onset    No Known Problems Mother     Stroke Father     No Known Problems Sister     No Known Problems Brother      Family Status   Relation Name Status    Mother  Alive    Father      Sister  Alive    Brother  Alive        SOCIAL HISTORY      reports that she has never smoked. She has never used smokeless tobacco. She reports current drug use. Drug: Marijuana Anna Amble). She reports that she does not drink alcohol. REVIEW OF SYSTEMS    (2-9 systems for level 4, 10 or more for level 5)     Review of Systems   Constitutional:  Negative for chills, fatigue and fever. HENT:  Negative for congestion, ear discharge and facial swelling. Eyes:  Negative for discharge and redness. Respiratory:  Negative for cough and shortness of breath. Cardiovascular:  Negative for chest pain. Gastrointestinal:  Negative for abdominal pain, constipation, diarrhea and vomiting. Genitourinary:  Negative for dysuria and hematuria. Musculoskeletal:  Negative for arthralgias. images are visualized and preliminarily interpreted by the emergency physician with the below findings:    Not indicated    Interpretation per the Radiologist below, if available at the time of this note:        LABS:  Labs Reviewed - No data to display    All other labs were within normal range or not returned as of this dictation. EMERGENCY DEPARTMENT COURSE and DIFFERENTIAL DIAGNOSIS/MDM:   Vitals:    Vitals:    08/01/22 1244 08/01/22 1246   BP: 99/68    Pulse: 77    Resp: 14    Temp:  98.5 °F (36.9 °C)   TempSrc:  Oral   SpO2:  100%   Weight: 100 lb (45.4 kg)    Height: 5' 9\" (1.753 m)        No orders of the defined types were placed in this encounter. Medical Decision Making: We are contacting  to arrange the patient to get back to her nursing home. There is no acute medical intervention required. CONSULTS:  None    PROCEDURES:  None    FINAL IMPRESSION      1. Poor social situation          DISPOSITION/PLAN   DISPOSITION Decision To Discharge 08/01/2022 01:14:20 PM      PATIENT REFERRED TO:   [facility] ED  [address] [facility]  [phone]    If symptoms worsen      DISCHARGE MEDICATIONS:     New Prescriptions    No medications on file       The care is provided during an unprecedented national emergency due to the novel coronavirus, COVID-19.     (Please note that portions of this note were completed with a voice recognition program.  Efforts were made to edit the dictations but occasionally words are mis-transcribed.)    Dominguez Vergara MD  Attending Emergency Physician           Dominguez Vergara MD  08/01/22 1153

## 2022-08-01 NOTE — ED NOTES
LSW informed that patient does not need a new HENS and asked for someone to inform of transport time when scheduled.

## 2022-09-12 ENCOUNTER — TELEPHONE (OUTPATIENT)
Dept: NEUROLOGY | Age: 32
End: 2022-09-12

## 2022-09-21 NOTE — TELEPHONE ENCOUNTER
I spoke with Carmenza Clay at the inf. center. Pt. had not shown up yet for the infusion. Advised her if she did come to let us know and Dr. Jewell Nguyen would do a verbal order. Has not been seen by another provider here since was seen by Dr. Terry Hidalgo in March.

## 2022-10-21 ENCOUNTER — TELEPHONE (OUTPATIENT)
Dept: NEUROLOGY | Age: 32
End: 2022-10-21

## 2022-10-21 NOTE — TELEPHONE ENCOUNTER
Noe Hence called in and lm that she needs to be scheduled for her Ocrevus infusion. She apparently missed her appt due to Covid. The number she left is [phone] but that is not a working number. I will try to contact [facility] office at [phone] to see if they have a phone number for her. I called [facility] and she is not a resident there.

## 2022-10-28 NOTE — TELEPHONE ENCOUNTER
Angel Wallace called in today and I told her I was trying to reach her but the number wasn't correct. She said her phone number is [phone]. She is at [address]. She last saw Dr. Asad Dozier in March and missed her Ocrevus infusion that was scheduled for Sept. 21, 2022 at [facility]. V[facility]. I scheduled her in a cancellation for 11/2/22 and she is going to try to find transportation.

## 2022-11-02 ENCOUNTER — OFFICE VISIT (OUTPATIENT)
Dept: NEUROLOGY | Age: 32
End: 2022-11-02
Payer: MEDICAID

## 2022-11-02 VITALS
SYSTOLIC BLOOD PRESSURE: 99 MMHG | HEIGHT: 69 IN | BODY MASS INDEX: 14.77 KG/M2 | HEART RATE: 87 BPM | DIASTOLIC BLOOD PRESSURE: 66 MMHG

## 2022-11-02 DIAGNOSIS — G35 MULTIPLE SCLEROSIS, PRIMARY CHRONIC PROGRESSIVE (HCC): Primary | ICD-10-CM

## 2022-11-02 PROCEDURE — 99214 OFFICE O/P EST MOD 30 MIN: CPT | Performed by: NURSE PRACTITIONER

## 2022-11-02 RX ORDER — DIPHENHYDRAMINE HYDROCHLORIDE 50 MG/ML
50 INJECTION INTRAMUSCULAR; INTRAVENOUS
OUTPATIENT
Start: 2022-11-02

## 2022-11-02 RX ORDER — FAMOTIDINE 10 MG/ML
20 INJECTION, SOLUTION INTRAVENOUS
OUTPATIENT
Start: 2022-11-02

## 2022-11-02 RX ORDER — ACETAMINOPHEN 325 MG/1
650 TABLET ORAL ONCE
Status: CANCELLED | OUTPATIENT
Start: 2022-11-02 | End: 2022-11-02

## 2022-11-02 RX ORDER — EPINEPHRINE 1 MG/ML
0.3 INJECTION, SOLUTION, CONCENTRATE INTRAVENOUS PRN
OUTPATIENT
Start: 2022-11-02

## 2022-11-02 RX ORDER — HEPARIN SODIUM (PORCINE) LOCK FLUSH IV SOLN 100 UNIT/ML 100 UNIT/ML
500 SOLUTION INTRAVENOUS PRN
OUTPATIENT
Start: 2022-11-02

## 2022-11-02 RX ORDER — SODIUM CHLORIDE 0.9 % (FLUSH) 0.9 %
5-40 SYRINGE (ML) INJECTION PRN
OUTPATIENT
Start: 2022-11-02

## 2022-11-02 RX ORDER — SODIUM CHLORIDE 9 MG/ML
INJECTION, SOLUTION INTRAVENOUS CONTINUOUS
OUTPATIENT
Start: 2022-11-02

## 2022-11-02 RX ORDER — SODIUM CHLORIDE 9 MG/ML
5-250 INJECTION, SOLUTION INTRAVENOUS PRN
Status: CANCELLED | OUTPATIENT
Start: 2022-11-02

## 2022-11-02 RX ORDER — ACETAMINOPHEN 325 MG/1
650 TABLET ORAL
OUTPATIENT
Start: 2022-11-02

## 2022-11-02 RX ORDER — ONDANSETRON 2 MG/ML
8 INJECTION INTRAMUSCULAR; INTRAVENOUS
OUTPATIENT
Start: 2022-11-02

## 2022-11-02 RX ORDER — SODIUM CHLORIDE 9 MG/ML
5-250 INJECTION, SOLUTION INTRAVENOUS PRN
OUTPATIENT
Start: 2022-11-02

## 2022-11-02 RX ORDER — METHYLPREDNISOLONE SODIUM SUCCINATE 125 MG/2ML
100 INJECTION, POWDER, LYOPHILIZED, FOR SOLUTION INTRAMUSCULAR; INTRAVENOUS ONCE
Status: CANCELLED | OUTPATIENT
Start: 2022-11-02

## 2022-11-02 RX ORDER — DIPHENHYDRAMINE HYDROCHLORIDE 50 MG/ML
25 INJECTION INTRAMUSCULAR; INTRAVENOUS ONCE
OUTPATIENT
Start: 2022-11-02 | End: 2022-11-02

## 2022-11-02 RX ORDER — ALBUTEROL SULFATE 90 UG/1
4 AEROSOL, METERED RESPIRATORY (INHALATION) PRN
OUTPATIENT
Start: 2022-11-02

## 2022-11-02 NOTE — PROGRESS NOTES
[facility]            [address]          [place], [address] [facility]          Dept: [phone]          Dept Fax: [phone]    MD Anjali Sesay MD Tari Patee, MD Jae Sero, CNP            11/2/2022      HISTORY OF PRESENT ILLNESS:       I had the pleasure of seeing Jullie Cheadle, who returns for continuing neurologic care. The patient was seen last on August 3, 2021 for treatment of primary progressive multiple sclerosis. The patient has primary progressive multiple sclerosis and was prescribed IV ocrevus infusions for disease modifying therapy. She is here today reporting she was due for ocrevus infusions in September 2022 however is currently delayed due to insurance. She notes tightness in her bilateral lower extremities however denies spasticity affecting her upper extremities. She uses her upper extremities without difficulties. She tolerates the ocrevus infusions without any adverse reactions. DISEASE SUMMARY  Date of onset: 2011  Date of diagnosis of MS: 2011  Disease course at onset: ? Relapsing-Remitting; has had multiple hosp \"~ 10 hosp\" for iv steroids as per patient  Current disease course: Progressive multiple sclerosis  Previous disease therapies: Copaxone and Gilenya  Current disease therapy: Ocrevus  CSF:   JCV serology result and date: OUMOU virus +ve ; index value 3.74 (2/24/20). Vitamin D: 7.9 (11/5/2019)  Smoking status: none  EDSS: 7.5 consisting of paraplegia; dysmetria; sensory impairment  T25W: -N/A-wheelchair-bound      Testing reviewed:    MRI brain, done at [facility] [address] (9/11/2020): Very numerous demyelinating plaques identified within cerebral hemispheres bilaterally, cerebellum bilaterally and within the brainstem. There is progressive involvement of a plaque within the upper midline medulla. There are 2 possibly 3 new enhancing demyelinating plaques identified.   The largest is a curvilinear juxtacortical plaque in the inferior lateral left frontal lobe. There is continued atrophy/thinning of the corpus callosum with demyelinating plaques within corpus callosum. MRI cervical spine, done at [facility], [facility] [address] (9/11/2020): Very numerous nonenhancing demyelinating plaques again identified within the cervical spinal cord. No active enhancing spinal cord plaques identified. MRI thoracic spine,  done at [facility] (9/11/2020):  Very numerous nonenhancing demyelinating plaques again identified within thoracic spinal cord. No active enhancing spinal cord plaques identified    Hepatitis B surface antigen 2/19/2020: Nonreactive. Hepatitis B surface antibody 2/19/2020: Reactive  Hep Be antibody 2/19/2020: Negative. OUMOU virus antibody titer 2/24/20:   JCV antibody: Positive.,  Index value: 3.74      Varicella zoster Ab, Ig G 2/19/20: Immune    MRI brain (w/wo) 11/5/2019: Extensive high signal in brain parenchyma above and below the tentorium. No abnormal enhancement are restricted diffusion signal to suggest recent plaque activity. No comparison exams to assess for stability or interval increase of patient's underlying demyelinating process. MRI Cervical Spine (11/6/19):  Extensive white matter lesions noted in the brainstem and cervical spinal  cord, compatible with demyelinating disease. No areas of active  demyelination.       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 No pertinent surgical history.      SOCIAL HISTORY:     Social History     Socioeconomic History    Marital status: Single     Spouse name: Not on file    Number of children: Not on file    Years of education: Not on file    Highest education level: Not on file   Occupational History    Not on file   Tobacco Use    Smoking status: Never    Smokeless tobacco: Never   Vaping Use    Vaping Use: Never used   Substance and Sexual Activity    Alcohol use: No    Drug use: Yes     Types: Marijuana (Weed)    Sexual activity: Yes     Birth control/protection: Other-see comments     Comment: does not use birth control d/t intercourse is infrequent   Other Topics Concern    Not on file   Social History Narrative    Not on file     Social Determinants of Health     Financial Resource Strain: Not on file   Food Insecurity: Not on file   Transportation Needs: Not on file   Physical Activity: Not on file   Stress: Not on file   Social Connections: Not on file   Intimate Partner Violence: Not on file   Housing Stability: Not on file       CURRENT MEDICATIONS:     Current Outpatient Medications   Medication Sig Dispense Refill    gabapentin (NEURONTIN) 100 MG capsule TAKE ONE CAP TID (Patient not taking: Reported on 7/26/2021) 90 capsule 3    vitamin D3 (CHOLECALCIFEROL) 25 MCG (1000 UT) TABS tablet Once daily (Patient not taking: No sig reported) 30 tablet 3    ondansetron (ZOFRAN) 4 MG tablet  (Patient not taking: No sig reported)      potassium chloride (KLOR-CON M) 20 MEQ extended release tablet  (Patient not taking: No sig reported)      ipratropium-albuterol (DUONEB) 0.5-2.5 (3) MG/3ML SOLN nebulizer solution  (Patient not taking: No sig reported)      fluticasone (FLONASE) 50 MCG/ACT nasal spray  (Patient not taking: No sig reported)      neomycin-polymyxin-hydrocortisone (CORTISPORIN) 3.5-52889-5 otic solution  (Patient not taking: No sig reported)      Multiple Vitamins-Minerals (MULTIVITAMIN PO) Take by mouth daily (Patient not taking: No sig reported)      sennosides-docusate sodium (SENOKOT-S) 8.6-50 MG tablet Take 1 tablet by mouth 2 times daily (Patient not taking: No sig reported)      ibuprofen (ADVIL;MOTRIN) 400 MG tablet Take 400 mg by mouth every 6 hours as needed for Pain (Patient not taking: No sig reported)      acetaminophen (TYLENOL) 650 MG extended release tablet Take 650 mg by mouth every 8 hours as needed for Pain (Patient not taking: No sig reported)      bisacodyl (DULCOLAX) 10 MG suppository Place 10 mg rectally daily (Patient not taking: No sig reported)      ferrous sulfate 325 (65 Fe) MG tablet Take 325 mg by mouth daily (with breakfast) (Patient not taking: No sig reported)      Magnesium Hydroxide (MILK OF MAGNESIA PO) Take 30 mLs by mouth as needed (Patient not taking: No sig reported)      ondansetron (ZOFRAN-ODT) 4 MG disintegrating tablet Take 1 tablet by mouth every 6 hours as needed for Nausea (Patient not taking: No sig reported)      levothyroxine (SYNTHROID) 50 MCG tablet Take 1 tablet by mouth Daily (Patient not taking: No sig reported) 30 tablet 3     No current facility-administered medications for this visit. ALLERGIES:     Allergies   Allergen Reactions    Ativan [Lorazepam]     Benadryl [Diphenhydramine] Swelling     Facial     Chocolate                                  REVIEW OF SYSTEMS        All items selected indicate a positive finding. Those items not selected are negative.   Constitutional [] Weight loss/gain   [] Fatigue  [] Fever/Chills   HEENT [] Hearing Loss  [] Visual Disturbance  [] Tinnitus  [] Eye pain   Respiratory [] Shortness of Breath  [] Cough  [] Snoring   Cardiovascular [] Chest Pain  [] Palpitations  [] Lightheaded   GI [] Constipation  [] Diarrhea  [] Swallowing change  [] Nausea/vomiting    [] Urinary Frequency  [] Urinary Urgency Musculoskeletal [] Neck pain  [] Back pain  [] Muscle pain  [] Restless legs   Dermatologic [] Skin changes   Neurologic [] Memory loss/confusion  [] Seizures  [x] Trouble walking or imbalance  [] Dizziness  [] Sleep disturbance  [x] Weakness  [] Numbness  [] Tremors  [] Speech Difficulty  [] Headaches  [] Light Sensitivity  [] Sound Sensitivity   Endocrinology []Excessive thirst  []Excessive hunger   Psychiatric [] Anxiety/Depression  [] Hallucination   Allergy/immunology []Hives/environmental allergies   Hematologic/lymph [] Abnormal bleeding  [] Abnormal bruising         PHYSICAL EXAMINATION:       Vitals:    11/02/22 1326   BP: 99/66   Pulse: 87                                              .                                                                                                    General Appearance:  Alert, cooperative, no signs of distress, appears stated age   Head:  Normocephalic, no signs of trauma   Eyes:  Conjunctiva/corneas clear;  eyelids intact   Ears:  Normal external ear and canals   Nose: Nares normal, mucosa normal, no drainage    Throat: Lips and tongue normal; teeth normal;  gums normal   Neck: Supple, intact flexion, extension and rotation;   trachea midline;  no adenopathy;   thyroid: not enlarged;   no carotid pulse abnormality   Back:   Symmetric, no curvature, ROM adequate   Lungs:   Respirations unlabored   Heart:  Regular rate and rhythm           Extremities: Extremities normal, no cyanosis, no edema   Pulses: Symmetric over head and neck   Skin: Skin color, texture normal, no rashes, no lesions                                     NEUROLOGIC EXAMINATION    Neurologic Exam     Mental Status   Oriented to person, place, and time. Attention: normal. Concentration: normal.   Speech: slurred     Cranial Nerves   Cranial nerves II through XII intact. Motor Exam   Muscle bulk: 5/5 in upper extremities, 1/5 in her lower extremities with increased tone and spasticity.     Gait, Coordination, and Reflexes     Gait  Gait: (non- ambulatory)          Medical Decision Making: In summary, your patient, Javy Pickens exhibits the following, with associated plan:    Primary progressive multiple sclerosis which was diagnosed in 2011 and is associated with paraplegia and sensory impairment  Continue IV ocrevus every 6 months  Continue to follow with ID  Return for follow up visit in 6 months            Signed: Belgica Mclain CNP      *Please note that portions of this note were completed with a voice recognition program.  Although every effort was made to insure the accuracy of this automated transcription, some errors in transcription may have occurred, occasionally words and are mis-transcribed    Provider Attestation: The documentation recorded by the scribe accurately reflects the service I personally performed and the decisions made by myself. Portions of this note were transcribed by a scribe. I personally performed the history, physical exam, and medical decision-making and confirm the accuracy of the information in the transcribed note. Scribe Attestation:   By signing my name below, Zena Page, attest that this documentation has been prepared under the direction and in the presence of Belgica Mclain CNP.

## 2022-11-15 NOTE — TELEPHONE ENCOUNTER
Patrick Haque called to see if Zee Yao had her Ocrevus infusion yet. She was informed that she still needs the infusion. [facility] infusion number was given and they stated they will get her scheduled.

## 2022-11-18 ENCOUNTER — HOSPITAL ENCOUNTER (OUTPATIENT)
Dept: ONCOLOGY | Age: 32
Discharge: HOME | End: 2022-11-18
Attending: PSYCHIATRY & NEUROLOGY | Admitting: PSYCHIATRY & NEUROLOGY
Payer: MEDICAID

## 2022-11-18 VITALS
DIASTOLIC BLOOD PRESSURE: 66 MMHG | RESPIRATION RATE: 16 BRPM | TEMPERATURE: 96.8 F | SYSTOLIC BLOOD PRESSURE: 108 MMHG | OXYGEN SATURATION: 99 % | HEART RATE: 70 BPM

## 2022-11-18 DIAGNOSIS — G35 MULTIPLE SCLEROSIS, PRIMARY CHRONIC PROGRESSIVE (HCC): Primary | ICD-10-CM

## 2022-11-18 LAB
ABSOLUTE EOS #: 0.49 K/UL (ref 0–0.44)
ABSOLUTE IMMATURE GRANULOCYTE: <0.03 K/UL (ref 0–0.3)
ABSOLUTE LYMPH #: 1.09 K/UL (ref 1.1–3.7)
ABSOLUTE MONO #: 0.74 K/UL (ref 0.1–1.2)
BASOPHILS # BLD: 1 % (ref 0–2)
BASOPHILS ABSOLUTE: 0.05 K/UL (ref 0–0.2)
EOSINOPHILS RELATIVE PERCENT: 10 % (ref 1–4)
HCT VFR BLD CALC: 43.2 % (ref 36.3–47.1)
HEMOGLOBIN: 13.9 G/DL (ref 11.9–15.1)
IMMATURE GRANULOCYTES: 0 %
LYMPHOCYTES # BLD: 22 % (ref 24–43)
MCH RBC QN AUTO: 29.5 PG (ref 25.2–33.5)
MCHC RBC AUTO-ENTMCNC: 32.2 G/DL (ref 28.4–34.8)
MCV RBC AUTO: 91.7 FL (ref 82.6–102.9)
MONOCYTES # BLD: 15 % (ref 3–12)
NRBC AUTOMATED: 0 PER 100 WBC
PDW BLD-RTO: 15 % (ref 11.8–14.4)
PLATELET # BLD: 290 K/UL (ref 138–453)
PMV BLD AUTO: 11.2 FL (ref 8.1–13.5)
RBC # BLD: 4.71 M/UL (ref 3.95–5.11)
RBC # BLD: ABNORMAL 10*6/UL
REASON FOR REJECTION: NORMAL
SEG NEUTROPHILS: 52 % (ref 36–65)
SEGMENTED NEUTROPHILS ABSOLUTE COUNT: 2.6 K/UL (ref 1.5–8.1)
WBC # BLD: 5 K/UL (ref 3.5–11.3)
ZZ NTE CLEAN UP: ORDERED TEST: NORMAL
ZZ NTE WITH NAME CLEAN UP: SPECIMEN SOURCE: NORMAL

## 2022-11-18 PROCEDURE — 96375 TX/PRO/DX INJ NEW DRUG ADDON: CPT

## 2022-11-18 PROCEDURE — 85025 COMPLETE CBC W/AUTO DIFF WBC: CPT

## 2022-11-18 PROCEDURE — 2580000003 HC RX 258: Performed by: NURSE PRACTITIONER

## 2022-11-18 PROCEDURE — 6360000002 HC RX W HCPCS: Performed by: NURSE PRACTITIONER

## 2022-11-18 PROCEDURE — 96366 THER/PROPH/DIAG IV INF ADDON: CPT

## 2022-11-18 PROCEDURE — 6370000000 HC RX 637 (ALT 250 FOR IP): Performed by: NURSE PRACTITIONER

## 2022-11-18 PROCEDURE — 96365 THER/PROPH/DIAG IV INF INIT: CPT

## 2022-11-18 RX ORDER — SODIUM CHLORIDE 9 MG/ML
5-250 INJECTION, SOLUTION INTRAVENOUS PRN
OUTPATIENT
Start: 2023-05-14

## 2022-11-18 RX ORDER — SODIUM CHLORIDE 9 MG/ML
5-250 INJECTION, SOLUTION INTRAVENOUS PRN
Status: DISCONTINUED | OUTPATIENT
Start: 2022-11-18 | End: 2022-11-18 | Stop reason: HOSPADM

## 2022-11-18 RX ORDER — DIPHENHYDRAMINE HYDROCHLORIDE 50 MG/ML
50 INJECTION INTRAMUSCULAR; INTRAVENOUS
OUTPATIENT
Start: 2023-05-14

## 2022-11-18 RX ORDER — DIPHENHYDRAMINE HYDROCHLORIDE 50 MG/ML
25 INJECTION INTRAMUSCULAR; INTRAVENOUS ONCE
OUTPATIENT
Start: 2023-05-14 | End: 2023-05-14

## 2022-11-18 RX ORDER — EPINEPHRINE 1 MG/ML
0.3 INJECTION, SOLUTION, CONCENTRATE INTRAVENOUS PRN
OUTPATIENT
Start: 2023-05-14

## 2022-11-18 RX ORDER — SODIUM CHLORIDE 9 MG/ML
INJECTION, SOLUTION INTRAVENOUS CONTINUOUS
OUTPATIENT
Start: 2023-05-14

## 2022-11-18 RX ORDER — ACETAMINOPHEN 325 MG/1
650 TABLET ORAL ONCE
OUTPATIENT
Start: 2023-05-14 | End: 2023-05-14

## 2022-11-18 RX ORDER — METHYLPREDNISOLONE SODIUM SUCCINATE 125 MG/2ML
100 INJECTION, POWDER, LYOPHILIZED, FOR SOLUTION INTRAMUSCULAR; INTRAVENOUS ONCE
OUTPATIENT
Start: 2023-05-14

## 2022-11-18 RX ORDER — SODIUM CHLORIDE 0.9 % (FLUSH) 0.9 %
5-40 SYRINGE (ML) INJECTION PRN
OUTPATIENT
Start: 2023-05-14

## 2022-11-18 RX ORDER — ALBUTEROL SULFATE 90 UG/1
4 AEROSOL, METERED RESPIRATORY (INHALATION) PRN
OUTPATIENT
Start: 2023-05-14

## 2022-11-18 RX ORDER — ACETAMINOPHEN 325 MG/1
650 TABLET ORAL ONCE
Status: COMPLETED | OUTPATIENT
Start: 2022-11-18 | End: 2022-11-18

## 2022-11-18 RX ORDER — METHYLPREDNISOLONE SODIUM SUCCINATE 125 MG/2ML
100 INJECTION, POWDER, LYOPHILIZED, FOR SOLUTION INTRAMUSCULAR; INTRAVENOUS ONCE
Status: COMPLETED | OUTPATIENT
Start: 2022-11-18 | End: 2022-11-18

## 2022-11-18 RX ORDER — ACETAMINOPHEN 325 MG/1
650 TABLET ORAL
OUTPATIENT
Start: 2023-05-14

## 2022-11-18 RX ORDER — HEPARIN SODIUM (PORCINE) LOCK FLUSH IV SOLN 100 UNIT/ML 100 UNIT/ML
500 SOLUTION INTRAVENOUS PRN
OUTPATIENT
Start: 2023-05-14

## 2022-11-18 RX ORDER — ONDANSETRON 2 MG/ML
8 INJECTION INTRAMUSCULAR; INTRAVENOUS
OUTPATIENT
Start: 2023-05-14

## 2022-11-18 RX ADMIN — METHYLPREDNISOLONE SODIUM SUCCINATE 100 MG: 125 INJECTION, POWDER, FOR SOLUTION INTRAMUSCULAR; INTRAVENOUS at 10:22

## 2022-11-18 RX ADMIN — ACETAMINOPHEN 650 MG: 325 TABLET ORAL at 10:22

## 2022-11-18 RX ADMIN — OCRELIZUMAB 600 MG: 300 INJECTION INTRAVENOUS at 10:53

## 2022-11-18 RX ADMIN — SODIUM CHLORIDE 50 ML/HR: 9 INJECTION, SOLUTION INTRAVENOUS at 10:53

## 2022-11-18 ASSESSMENT — PAIN SCALES - GENERAL: PAINLEVEL_OUTOF10: 4

## 2022-11-18 NOTE — PLAN OF CARE
[id number] Patient arrived for infusion. IV placed without complication. Labs drawn. Pre meds given. Lunch tray provided. Patient tolerated infusion well. Changed brief. Started at 50ml/hr ended at 300ml/hr. Patient left in wheelchair with [facility] transportation back to nursing home with all belongings.  [id number] CRAiLAR

## 2023-01-01 NOTE — PROGRESS NOTES
progressive multiple sclerosis. Then patient has had multiple hospitalizations symptoms suggesting MS progression; requiring IV steroid therapy. Multiple discussions done in the past for the need to be on disease modifying therapy. Discussed and reviewed literature with the patient regarding different options. Patient reviewed the literature and she wanted IV Ocrevus. She has strong family hx of breast cancer in her paternal grandma and sister of paternal grandma. She is requesting for refills on vitamin D and also for gabapentin for pain and cramps along with the painful sensations in bilateral lower extremities. Pins-and-needles sensations occur in bilateral lower extremities. Has not been on any antispasmodics. Previous Neurological Work-up:   CT head 11/4/2019: No acute intracranial abnormality. Patchy white matter disease consistent with history of multiple sclerosis. Mild generalized atrophy for patient's age. MRI brain (w/wo) 11/5/2019: Extensive high signal in brain parenchyma above and below the tentorium. No abnormal enhancement are restricted diffusion signal to suggest recent plaque activity. No comparison exams to assess for stability or interval increase of patient's underlying demyelinating process. Vitamin D 25-hydroxy level 11/5/2019: 7.9  Vitamin B12 level: 461  TSH level 11/5/2019: 0.2 (0.3 -5)  Free T4 level 11/5/2019:  0.76 (0.9-1.7)   MRI Cervical Spine (11/6/19):  Extensive white matter lesions noted in the brainstem and cervical spinal  cord, compatible with demyelinating disease.  No areas of active  demyelination.    DISEASE SUMMARY  Date of onset: 2011  Date of diagnosis of MS: 2011  Disease course at onset: ? Relapsing-Remitting; has had multiple hosp \"~ 10 hosp\" for iv steroids as per patient  Current disease course: Progressive multiple sclerosis  Previous disease therapies: Copaxone and Gilenya  Current disease therapy: none  CSF:   JCV serology result and date: OUMOU virus +ve ; index value 3.74 (2/24/20). Vitamin D: 7.9 (11/5/2019)  Smoking status: none  EDSS: 7.5 consisting of paraplegia; dysmetria; sensory impairment  9HPT:  T25W:     Review of systems done by staff reviewed and pertinent positives include Balance difficulties, dizziness, weakness, numbness, spasticity, neck pain, back pain, stiffness, joint pains and recent hosp as stated above.     Current Outpatient Medications on File Prior to Visit   Medication Sig Dispense Refill    gabapentin (NEURONTIN) 100 MG capsule TAKE ONE CAP TID 90 capsule 3    baclofen (LIORESAL) 10 MG tablet Take 1 tablet by mouth 3 times daily 90 tablet 3    vitamin D3 (CHOLECALCIFEROL) 25 MCG (1000 UT) TABS tablet Once daily 30 tablet 3    ondansetron (ZOFRAN) 4 MG tablet       potassium chloride (KLOR-CON M) 20 MEQ extended release tablet       ipratropium-albuterol (DUONEB) 0.5-2.5 (3) MG/3ML SOLN nebulizer solution       fluticasone (FLONASE) 50 MCG/ACT nasal spray       neomycin-polymyxin-hydrocortisone (CORTISPORIN) 3.5-95761-5 otic solution       Multiple Vitamins-Minerals (MULTIVITAMIN PO) Take by mouth daily      sennosides-docusate sodium (SENOKOT-S) 8.6-50 MG tablet Take 1 tablet by mouth 2 times daily      ibuprofen (ADVIL;MOTRIN) 400 MG tablet Take 400 mg by mouth every 6 hours as needed for Pain      acetaminophen (ACETAMINOPHEN 8 HOUR) 650 MG extended release tablet Take 650 mg by mouth every 8 hours as needed for Pain      bisacodyl (DULCOLAX) 10 MG suppository Place 10 mg rectally daily      ferrous sulfate 325 (65 Fe) MG tablet Take 325 mg by mouth daily (with breakfast)      Magnesium Hydroxide (MILK OF MAGNESIA PO) Take 30 mLs by mouth as needed      ondansetron (ZOFRAN-ODT) 4 MG disintegrating tablet Take 1 tablet by mouth every 6 hours as needed for Nausea (Patient not taking: Reported on 10/5/2020)      levothyroxine (SYNTHROID) 50 MCG tablet Take 1 tablet by mouth Daily (Patient not taking: Reported on 6/1/2020) 30 tablet 3     No current facility-administered medications on file prior to visit. Allergies: Michelle Palmer is allergic to ativan [lorazepam]; benadryl [diphenhydramine]; and chocolate. 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    Arthritis     Asthma     Encounter for medication monitoring 11/14/2019    Exacerbation of multiple sclerosis ([facility] [place] [address] 2018    Family history of breast cancer 3/7/2020    OUMOU virus antibody positive 3/7/2020    Lower paraplegia ([facility] [address] 11/14/2019    Marijuana smoker, continuous 11/5/2019    MS (multiple sclerosis) ([facility] [address]     Neuromuscular disorder ([facility] [address]     Primary chronic progressive multiple sclerosis ([facility] [address] 11/14/2019    Rash in adult 11/5/2019       No past surgical history on file. Social History: Michelle Palmer  reports that she has never smoked. She has never used smokeless tobacco. She reports current drug use. Drug: Marijuana. She reports that she does not drink alcohol. Family History   Problem Relation Age of Onset    No Known Problems Mother     Stroke Father     No Known Problems Sister     No Known Problems Brother      On exam; 80 LB ; height 5'10\". Appears comfortable and in no respiratory distress.     NEUROLOGIC EXAMINATION  GENERAL  Appears comfortable and in no distress   HEENT  NC/ AT   NECK  Supple    MENTAL STATUS:  Alert, oriented, intact memory, no confusion, normal speech, normal language, no hallucination or delusion   CRANIAL NERVES: II     -      PERRLA, Visual fields grossly full   III,IV,VI -  EOMs full, no NIKITA, no ptosis  V     -     Unable to perform  VII    -     Normal facial symmetry  VIII   -     Intact hearing  IX,X -     unable to perform  XI    -     Symmetrical shoulder shrug  XII   -     Midline tongue, no atrophy    MOTOR FUNCTION:  significant for visible weakness of paraplegia in in bilateral lower extremities and mild weakness in right upper extremity and normal strength in left upper extremity with atrophy in right upper extremity and spasticity in bilateral lower extremities with no tremors. SENSORY FUNCTION:  Unable to perform   CEREBELLAR FUNCTION:  Dysmetria on finger-nose-finger testing on bilateral upper extremities    REFLEX FUNCTION:  Unable to perform   STATION and GAIT  wheelchair-bound          MRI brain (w/wo) 11/5/2019: Extensive high signal in brain parenchyma above and below the tentorium. No abnormal enhancement are restricted diffusion signal to suggest recent plaque activity. No comparison exams to assess for stability or interval increase of patient's underlying demyelinating process.         MRI Cervical Spine (11/6/19):  Extensive white matter lesions noted in the brainstem and cervical spinal  cord, compatible with demyelinating disease.  No areas of active  demyelination.       Hepatitis B surface antigen 2/19/2020: Nonreactive. Hepatitis B surface antibody 2/19/2020: Reactive  Hep Be antibody 2/19/2020: Negative. OUMOU virus antibody titer 2/24/20:   JCV antibody: Positive.,  Index value: 3.74     Varicella zoster Ab, Ig G 2/19/20: Immune    MRI brain, done at [place], [place] (9/11/2020): Very numerous demyelinating plaques identified within cerebral hemispheres bilaterally, cerebellum bilaterally and within the brainstem. There is progressive involvement of a plaque within the upper midline medulla. There are 2 possibly 3 new enhancing demyelinating plaques identified. The largest is a curvilinear juxtacortical plaque in the inferior lateral left frontal lobe. There is continued atrophy/thinning of the corpus callosum with demyelinating plaques within corpus callosum. MRI cervical spine, done at [place], [place] (9/11/2020): Very numerous nonenhancing demyelinating plaques again identified within the cervical spinal cord. No active enhancing spinal cord plaques identified.     MRI thoracic spine,  done at [facility] [place], [place] (9/11/2020):  Very numerous nonenhancing demyelinating plaques again identified within thoracic spinal cord. No active enhancing spinal cord plaques identified            Impression and Plan: Ms. Zen Lawton is a 26 y.o. female with   Primary Progressive multiple Sclerosis diagnosed in 2011; multiple hosp for IV steroid rx; last hosp on 9/11/13 at [facility] as above. Had clearance from oncologist, Dr. Han's office for IV Ocrevus infusion. Patient had forgotten to get the clearance from infectious disease expert. Patient had OUMOU virus positive along with Reactive Hepatitis ab; immune varicella zoster. Therefore patient was advised in the past getting infectious disease consultation. She totally forgot about it. Today patient's mom was clearly explained about it. Patient's mom understood that patient needs infectious disease expert's opinion regarding this before starting the patient on immunosupressive drug at this point of time during COVID 19 pandemic. Hx of multiple hosp (11/4/19 & Feb 2020 & March 2020& Sept2020 ) for right-sided weakness; right-sided spasticity; s/p Pulse steroid therapy  Chronic paraplegia  OUMOU Virus +ve with Ab titre 3.74; Recent Brain MRI (11/5/19)  without any changes suggesting PML; to cont monitoring  Vitamin D deficiency: was on Vitamin D supplements    Thyroid disease; likely hypopituitarism   Spasticity and dysesthesias     Will continue Baclofen and Gabapentin for spasticity and dysthesias  Also to continue vitamin D supplements and thyroid supplements. Continue PT/ OT. Disease modifying therapy:  Discussion done in the past regarding various disease modifying therapies for progressive multiple sclerosis; these include Siponimod, ocrelizumab, rituximab, mitoxantrone, cyclophosphamide & T etc. etc.  She preferred IV eculizumab therapy. She will be initiated on IV Ocrevus infusion once clearance obtained.   Follow-up in stated

## 2023-08-16 ENCOUNTER — HOSPITAL ENCOUNTER (OUTPATIENT)
Age: 33
Setting detail: OBSERVATION
Discharge: NURSING, REHAB OR LONG-TERM CARE | End: 2023-08-21
Attending: EMERGENCY MEDICINE | Admitting: INTERNAL MEDICINE
Payer: MEDICAID

## 2023-08-16 DIAGNOSIS — R63.0 ANOREXIA: ICD-10-CM

## 2023-08-16 DIAGNOSIS — G35 MS (MULTIPLE SCLEROSIS) (HCC): Primary | ICD-10-CM

## 2023-08-16 PROCEDURE — 99285 EMERGENCY DEPT VISIT HI MDM: CPT

## 2023-08-16 ASSESSMENT — PAIN DESCRIPTION - LOCATION: LOCATION: BACK

## 2023-08-16 ASSESSMENT — PAIN SCALES - GENERAL: PAINLEVEL_OUTOF10: 6

## 2023-08-16 ASSESSMENT — PAIN - FUNCTIONAL ASSESSMENT: PAIN_FUNCTIONAL_ASSESSMENT: 0-10

## 2023-08-17 ENCOUNTER — APPOINTMENT (OUTPATIENT)
Dept: MRI IMAGING | Age: 33
End: 2023-08-17
Payer: MEDICAID

## 2023-08-17 PROBLEM — E46 PROTEIN-CALORIE MALNUTRITION (HCC): Status: ACTIVE | Noted: 2023-08-17

## 2023-08-17 PROBLEM — F17.200 SMOKER: Status: ACTIVE | Noted: 2023-08-17

## 2023-08-17 LAB
ALBUMIN SERPL-MCNC: 3.8 G/DL (ref 3.5–5.2)
ALP SERPL-CCNC: 54 U/L (ref 35–104)
ALT SERPL-CCNC: 17 U/L (ref 5–33)
ANION GAP SERPL CALCULATED.3IONS-SCNC: 7 MMOL/L (ref 9–17)
ANION GAP SERPL CALCULATED.3IONS-SCNC: 8 MMOL/L (ref 9–17)
AST SERPL-CCNC: 14 U/L
BACTERIA URNS QL MICRO: ABNORMAL
BASOPHILS # BLD: 0.04 K/UL (ref 0–0.2)
BASOPHILS NFR BLD: 1 % (ref 0–2)
BILIRUB DIRECT SERPL-MCNC: <0.1 MG/DL
BILIRUB INDIRECT SERPL-MCNC: ABNORMAL MG/DL (ref 0–1)
BILIRUB SERPL-MCNC: 0.1 MG/DL (ref 0.3–1.2)
BILIRUB UR QL STRIP: ABNORMAL
BUN SERPL-MCNC: 12 MG/DL (ref 6–20)
BUN SERPL-MCNC: 12 MG/DL (ref 6–20)
BUN/CREAT SERPL: 24 (ref 9–20)
BUN/CREAT SERPL: 24 (ref 9–20)
CALCIUM SERPL-MCNC: 8.8 MG/DL (ref 8.6–10.4)
CALCIUM SERPL-MCNC: 9.6 MG/DL (ref 8.6–10.4)
CHLORIDE SERPL-SCNC: 107 MMOL/L (ref 98–107)
CHLORIDE SERPL-SCNC: 110 MMOL/L (ref 98–107)
CLARITY UR: ABNORMAL
CO2 SERPL-SCNC: 21 MMOL/L (ref 20–31)
CO2 SERPL-SCNC: 27 MMOL/L (ref 20–31)
COLOR UR: ABNORMAL
CREAT SERPL-MCNC: 0.5 MG/DL (ref 0.5–0.9)
CREAT SERPL-MCNC: 0.5 MG/DL (ref 0.5–0.9)
EOSINOPHIL # BLD: 0.29 K/UL (ref 0–0.44)
EOSINOPHILS RELATIVE PERCENT: 5 % (ref 1–4)
EPI CELLS #/AREA URNS HPF: ABNORMAL /HPF (ref 0–5)
ERYTHROCYTE [DISTWIDTH] IN BLOOD BY AUTOMATED COUNT: 13.4 % (ref 11.8–14.4)
GFR SERPL CREATININE-BSD FRML MDRD: >60 ML/MIN/1.73M2
GFR SERPL CREATININE-BSD FRML MDRD: >60 ML/MIN/1.73M2
GLUCOSE SERPL-MCNC: 118 MG/DL (ref 70–99)
GLUCOSE SERPL-MCNC: 77 MG/DL (ref 70–99)
GLUCOSE UR STRIP-MCNC: NEGATIVE MG/DL
HCT VFR BLD AUTO: 41.5 % (ref 36.3–47.1)
HGB BLD-MCNC: 12.8 G/DL (ref 11.9–15.1)
HGB UR QL STRIP.AUTO: ABNORMAL
IMM GRANULOCYTES # BLD AUTO: 0.04 K/UL (ref 0–0.3)
IMM GRANULOCYTES NFR BLD: 1 %
KETONES UR STRIP-MCNC: ABNORMAL MG/DL
LEUKOCYTE ESTERASE UR QL STRIP: NEGATIVE
LYMPHOCYTES NFR BLD: 1.11 K/UL (ref 1.1–3.7)
LYMPHOCYTES RELATIVE PERCENT: 20 % (ref 24–43)
MCH RBC QN AUTO: 29 PG (ref 25.2–33.5)
MCHC RBC AUTO-ENTMCNC: 30.8 G/DL (ref 28.4–34.8)
MCV RBC AUTO: 93.9 FL (ref 82.6–102.9)
MONOCYTES NFR BLD: 0.68 K/UL (ref 0.1–1.2)
MONOCYTES NFR BLD: 12 % (ref 3–12)
NEUTROPHILS NFR BLD: 61 % (ref 36–65)
NEUTS SEG NFR BLD: 3.48 K/UL (ref 1.5–8.1)
NITRITE UR QL STRIP: NEGATIVE
NRBC BLD-RTO: 0 PER 100 WBC
PH UR STRIP: 6 [PH] (ref 5–8)
PLATELET # BLD AUTO: 235 K/UL (ref 138–453)
PMV BLD AUTO: 10.4 FL (ref 8.1–13.5)
POTASSIUM SERPL-SCNC: 3.9 MMOL/L (ref 3.7–5.3)
POTASSIUM SERPL-SCNC: 4.1 MMOL/L (ref 3.7–5.3)
PROT SERPL-MCNC: 6.7 G/DL (ref 6.4–8.3)
PROT UR STRIP-MCNC: ABNORMAL MG/DL
RBC # BLD AUTO: 4.42 M/UL (ref 3.95–5.11)
RBC #/AREA URNS HPF: ABNORMAL /HPF (ref 0–2)
REASON FOR REJECTION: NORMAL
SODIUM SERPL-SCNC: 139 MMOL/L (ref 135–144)
SODIUM SERPL-SCNC: 141 MMOL/L (ref 135–144)
SP GR UR STRIP: 1.04 (ref 1–1.03)
SPECIMEN SOURCE: NORMAL
UROBILINOGEN UR STRIP-ACNC: NORMAL EU/DL (ref 0–1)
WBC #/AREA URNS HPF: ABNORMAL /HPF (ref 0–5)
WBC OTHER # BLD: 5.6 K/UL (ref 3.5–11.3)
ZZ NTE CLEAN UP: ORDERED TEST: NORMAL

## 2023-08-17 PROCEDURE — 96374 THER/PROPH/DIAG INJ IV PUSH: CPT

## 2023-08-17 PROCEDURE — 6360000004 HC RX CONTRAST MEDICATION: Performed by: STUDENT IN AN ORGANIZED HEALTH CARE EDUCATION/TRAINING PROGRAM

## 2023-08-17 PROCEDURE — 85025 COMPLETE CBC W/AUTO DIFF WBC: CPT

## 2023-08-17 PROCEDURE — 2580000003 HC RX 258: Performed by: EMERGENCY MEDICINE

## 2023-08-17 PROCEDURE — 81001 URINALYSIS AUTO W/SCOPE: CPT

## 2023-08-17 PROCEDURE — 97162 PT EVAL MOD COMPLEX 30 MIN: CPT

## 2023-08-17 PROCEDURE — 97530 THERAPEUTIC ACTIVITIES: CPT

## 2023-08-17 PROCEDURE — APPSS45 APP SPLIT SHARED TIME 31-45 MINUTES: Performed by: NURSE PRACTITIONER

## 2023-08-17 PROCEDURE — 72156 MRI NECK SPINE W/O & W/DYE: CPT

## 2023-08-17 PROCEDURE — 6360000002 HC RX W HCPCS: Performed by: EMERGENCY MEDICINE

## 2023-08-17 PROCEDURE — 97110 THERAPEUTIC EXERCISES: CPT

## 2023-08-17 PROCEDURE — G0378 HOSPITAL OBSERVATION PER HR: HCPCS

## 2023-08-17 PROCEDURE — 97166 OT EVAL MOD COMPLEX 45 MIN: CPT

## 2023-08-17 PROCEDURE — 70553 MRI BRAIN STEM W/O & W/DYE: CPT

## 2023-08-17 PROCEDURE — A9579 GAD-BASE MR CONTRAST NOS,1ML: HCPCS | Performed by: STUDENT IN AN ORGANIZED HEALTH CARE EDUCATION/TRAINING PROGRAM

## 2023-08-17 PROCEDURE — 80076 HEPATIC FUNCTION PANEL: CPT

## 2023-08-17 PROCEDURE — 97535 SELF CARE MNGMENT TRAINING: CPT

## 2023-08-17 PROCEDURE — 2580000003 HC RX 258: Performed by: NURSE PRACTITIONER

## 2023-08-17 PROCEDURE — 80048 BASIC METABOLIC PNL TOTAL CA: CPT

## 2023-08-17 PROCEDURE — 99222 1ST HOSP IP/OBS MODERATE 55: CPT | Performed by: STUDENT IN AN ORGANIZED HEALTH CARE EDUCATION/TRAINING PROGRAM

## 2023-08-17 PROCEDURE — 96372 THER/PROPH/DIAG INJ SC/IM: CPT

## 2023-08-17 PROCEDURE — 6360000002 HC RX W HCPCS: Performed by: NURSE PRACTITIONER

## 2023-08-17 RX ORDER — HALOPERIDOL 5 MG/ML
5 INJECTION INTRAMUSCULAR ONCE
Status: DISCONTINUED | OUTPATIENT
Start: 2023-08-17 | End: 2023-08-17

## 2023-08-17 RX ORDER — MAGNESIUM SULFATE 1 G/100ML
1000 INJECTION INTRAVENOUS PRN
Status: DISCONTINUED | OUTPATIENT
Start: 2023-08-17 | End: 2023-08-21 | Stop reason: HOSPADM

## 2023-08-17 RX ORDER — HALOPERIDOL 5 MG/ML
2 INJECTION INTRAMUSCULAR ONCE
Status: DISCONTINUED | OUTPATIENT
Start: 2023-08-17 | End: 2023-08-21 | Stop reason: HOSPADM

## 2023-08-17 RX ORDER — 0.9 % SODIUM CHLORIDE 0.9 %
1000 INTRAVENOUS SOLUTION INTRAVENOUS ONCE
Status: COMPLETED | OUTPATIENT
Start: 2023-08-17 | End: 2023-08-17

## 2023-08-17 RX ORDER — SODIUM CHLORIDE 0.9 % (FLUSH) 0.9 %
5-40 SYRINGE (ML) INJECTION EVERY 12 HOURS SCHEDULED
Status: DISCONTINUED | OUTPATIENT
Start: 2023-08-17 | End: 2023-08-21 | Stop reason: HOSPADM

## 2023-08-17 RX ORDER — ONDANSETRON 2 MG/ML
4 INJECTION INTRAMUSCULAR; INTRAVENOUS EVERY 6 HOURS PRN
Status: DISCONTINUED | OUTPATIENT
Start: 2023-08-17 | End: 2023-08-21 | Stop reason: HOSPADM

## 2023-08-17 RX ORDER — ONDANSETRON 4 MG/1
4 TABLET, ORALLY DISINTEGRATING ORAL EVERY 8 HOURS PRN
Status: DISCONTINUED | OUTPATIENT
Start: 2023-08-17 | End: 2023-08-21 | Stop reason: HOSPADM

## 2023-08-17 RX ORDER — ACETAMINOPHEN 325 MG/1
650 TABLET ORAL EVERY 6 HOURS PRN
Status: DISCONTINUED | OUTPATIENT
Start: 2023-08-17 | End: 2023-08-21 | Stop reason: HOSPADM

## 2023-08-17 RX ORDER — ACETAMINOPHEN 650 MG/1
650 SUPPOSITORY RECTAL EVERY 6 HOURS PRN
Status: DISCONTINUED | OUTPATIENT
Start: 2023-08-17 | End: 2023-08-21 | Stop reason: HOSPADM

## 2023-08-17 RX ORDER — POLYETHYLENE GLYCOL 3350 17 G/17G
17 POWDER, FOR SOLUTION ORAL DAILY PRN
Status: DISCONTINUED | OUTPATIENT
Start: 2023-08-17 | End: 2023-08-21 | Stop reason: HOSPADM

## 2023-08-17 RX ORDER — SODIUM CHLORIDE 9 MG/ML
INJECTION, SOLUTION INTRAVENOUS PRN
Status: DISCONTINUED | OUTPATIENT
Start: 2023-08-17 | End: 2023-08-21 | Stop reason: HOSPADM

## 2023-08-17 RX ORDER — SODIUM CHLORIDE 9 MG/ML
INJECTION, SOLUTION INTRAVENOUS CONTINUOUS
Status: DISCONTINUED | OUTPATIENT
Start: 2023-08-17 | End: 2023-08-18

## 2023-08-17 RX ORDER — SODIUM CHLORIDE 0.9 % (FLUSH) 0.9 %
10 SYRINGE (ML) INJECTION ONCE
Status: DISCONTINUED | OUTPATIENT
Start: 2023-08-17 | End: 2023-08-21 | Stop reason: HOSPADM

## 2023-08-17 RX ORDER — POTASSIUM CHLORIDE 7.45 MG/ML
10 INJECTION INTRAVENOUS PRN
Status: DISCONTINUED | OUTPATIENT
Start: 2023-08-17 | End: 2023-08-21 | Stop reason: HOSPADM

## 2023-08-17 RX ORDER — SODIUM CHLORIDE 0.9 % (FLUSH) 0.9 %
10 SYRINGE (ML) INJECTION PRN
Status: DISCONTINUED | OUTPATIENT
Start: 2023-08-17 | End: 2023-08-21 | Stop reason: HOSPADM

## 2023-08-17 RX ORDER — POTASSIUM CHLORIDE 20 MEQ/1
40 TABLET, EXTENDED RELEASE ORAL PRN
Status: DISCONTINUED | OUTPATIENT
Start: 2023-08-17 | End: 2023-08-21 | Stop reason: HOSPADM

## 2023-08-17 RX ORDER — ENOXAPARIN SODIUM 100 MG/ML
40 INJECTION SUBCUTANEOUS DAILY
Status: DISCONTINUED | OUTPATIENT
Start: 2023-08-17 | End: 2023-08-18

## 2023-08-17 RX ADMIN — SODIUM CHLORIDE 75 ML/HR: 9 INJECTION, SOLUTION INTRAVENOUS at 12:21

## 2023-08-17 RX ADMIN — SODIUM CHLORIDE 1000 ML: 9 INJECTION, SOLUTION INTRAVENOUS at 00:16

## 2023-08-17 RX ADMIN — GADOTERIDOL 10 ML: 279.3 INJECTION, SOLUTION INTRAVENOUS at 15:43

## 2023-08-17 RX ADMIN — ENOXAPARIN SODIUM 40 MG: 100 INJECTION SUBCUTANEOUS at 12:21

## 2023-08-17 RX ADMIN — METHYLPREDNISOLONE SODIUM SUCCINATE 125 MG: 125 INJECTION, POWDER, FOR SOLUTION INTRAMUSCULAR; INTRAVENOUS at 00:15

## 2023-08-17 ASSESSMENT — ENCOUNTER SYMPTOMS
EYE REDNESS: 0
SORE THROAT: 0
VOMITING: 0
PHOTOPHOBIA: 0
NAUSEA: 0
COUGH: 0
CONSTIPATION: 0
SHORTNESS OF BREATH: 0
SINUS PRESSURE: 0
DIARRHEA: 0

## 2023-08-17 ASSESSMENT — PAIN SCALES - GENERAL
PAINLEVEL_OUTOF10: 0
PAINLEVEL_OUTOF10: 6

## 2023-08-17 ASSESSMENT — PAIN - FUNCTIONAL ASSESSMENT: PAIN_FUNCTIONAL_ASSESSMENT: 0-10

## 2023-08-17 NOTE — CONSULTS
..  Palliative Care Inpatient Consult    NAME:  Ruth [facility] RECORD NUMBER:  [id number]  AGE: 26 y.o. GENDER: female  : 1990  TODAY'S DATE:  2023    Reasons for Consultation:    Provision of information regarding PC and/or hospice philosophies  Complex, time-intensive communication and interdisciplinary psychosocial support  Clarification of goals of care and/or assistance with difficult decision-making  Guidance in regards to resources and transition(s)    Code Status: Full Code    Members of PC team contributing to this consultation are :  Faina Chong RN    PLAN:  -Pt is agreeable to long term care. She will need a referral to a PCP and also f/u with a neurologist  -Pt agreeable to seeing palliative care outpatient to assist with symptom mgt and continued goals of care discussion. Will make referral once a discharge plan is established  -Discussed code status. She wishes to remain a full code   -Will continue to follow for discharge plan and then will refer to outpatient palliative program.    History of Present Illness     The patient is a 26 y.o. Non- / non  female who presents with Fatigue (MS flare up)    Referred to Palliative Care by   [x] Physician   [] Nursing  [] Family Request   [] Other:       She was admitted to the primary service for Multiple sclerosis ([address]) Mara Moore. Her hospital course has been associated with Exacerbation of multiple sclerosis ([address]).  The patient has a complicated medical history and has been hospitalized since 2023 11:34 PM.    Active Hospital Problems    Diagnosis Date Noted    Protein-calorie malnutrition ([address]) Yenifer [place]ron 2023    Smoker [F17.200] 2023    Lower paraplegia ([address]) [G82.20] 2019    Exacerbation of multiple sclerosis ([address]) Mara Moore 2019       Data        Code Status: Full Code     ADVANCED CARE PLANNING:  Patient has capacity for medical decisions: yes  [address]: no  Living Will: no
paraplegia [facility]) [G82.20] 11/14/2019    Primary chronic progressive multiple sclerosis ([address]) Avelina  11/14/2019       Plan:     Patient status Admit as inpatient in the  Progressive Unit/Step down  General neurology will sign off at this point please contact our team if you have any questions or concerns. Otherwise patient does not have any active acute demyelinating process and is significantly disabled and unable to care for self recommend placement following PT/OT/ST and PMR evaluation. Primary chronic progressive multiple sclerosis  -MRI brain and cervical spine with and without contrast completed no evidence for acute demyelinating process  -Due to limitations in facility can hold off on thoracic MRI and obtain outpatient basis given that she has not had previous lesions noted here and her current debility has progressed over the last 6 months without any acute changes  -Patient needs to resume [facility] outpatient  -We will arrange for outpatient follow-up with [address] [place] [address] MS specialist to resume outpatient [facility] and patient information routed to our clinic manager to assist in close outpatient follow-up  -Unfortunately given that there is no new acute inflammatory process and patient's multiple sclerosis there is no further treatment and patient from neurology standpoint. Recommend aggressive PT/OT/ST  PM&R evaluation for placement    Consultations:   IP CONSULT TO INTERNAL MEDICINE  IP CONSULT TO NEUROLOGY  IP CONSULT TO SOCIAL WORK  IP CONSULT TO PALLIATIVE CARE      Follow-up further recommendations after discussing the case with attending  The plan was discussed with the patient, patient's family and the medical staff. Patient is admitted as inpatient status because of co-morbidities listed above, severity of signs and symptoms as outlined, requirement for current medical therapies and most importantly because of direct risk to patient if care not provided in a hospital setting.     Tirso Patrick,

## 2023-08-17 NOTE — PLAN OF CARE
Problem: Discharge Planning  Goal: Discharge to home or other facility with appropriate resources  Outcome: Progressing  Flowsheets  Taken 8/17/2023 1853  Discharge to home or other facility with appropriate resources:   Identify barriers to discharge with patient and caregiver   Arrange for needed discharge resources and transportation as appropriate   Identify discharge learning needs (meds, wound care, etc)   Refer to discharge planning if patient needs post-hospital services based on physician order or complex needs related to functional status, cognitive ability or social support system  Taken 8/17/2023 1839  Discharge to home or other facility with appropriate resources:   Identify barriers to discharge with patient and caregiver   Arrange for needed discharge resources and transportation as appropriate   Identify discharge learning needs (meds, wound care, etc)   Refer to discharge planning if patient needs post-hospital services based on physician order or complex needs related to functional status, cognitive ability or social support system     Problem: Pain  Goal: Verbalizes/displays adequate comfort level or baseline comfort level  Outcome: Progressing     Problem: Skin/Tissue Integrity  Goal: Absence of new skin breakdown  Description: 1. Monitor for areas of redness and/or skin breakdown  2. Assess vascular access sites hourly  3. Every 4-6 hours minimum:  Change oxygen saturation probe site  4. Every 4-6 hours:  If on nasal continuous positive airway pressure, respiratory therapy assess nares and determine need for appliance change or resting period.   Outcome: Progressing     Problem: Safety - Adult  Goal: Free from fall injury  Outcome: Progressing     Problem: ABCDS Injury Assessment  Goal: Absence of physical injury  Outcome: Progressing

## 2023-08-17 NOTE — ED PROVIDER NOTES
EMERGENCY DEPARTMENT ENCOUNTER    Pt Name: Addie Coles  MRN: [id number]  [address] [place] 1990  Date of evaluation: 8/17/23  CHIEF COMPLAINT       Chief Complaint   Patient presents with    Fatigue     MS flare up     HISTORY OF PRESENT ILLNESS   HPI   Patient is a 38-year-old female who is brought in by mom for \"my joints feel like cement. \"  Mom states patient has history of MS and has not been on medications [facility]. Mom in the process of reestablishing care with previous practitioner. 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    Arthritis     Asthma     Encounter for medication monitoring 11/14/2019    Exacerbation of multiple sclerosis ([address]) 2018    Family history of breast cancer 3/7/2020    OUMOU virus antibody positive 3/7/2020    Lower paraplegia ([address]) 11/14/2019    Marijuana smoker, continuous 11/5/2019    MS (multiple sclerosis) ([address])     Neuromuscular disorder ([address])     Primary chronic progressive multiple sclerosis ([address]) 11/14/2019    Rash in adult 11/5/2019     Past Problem List  Patient Active Problem List   Diagnosis Code    Exacerbation of multiple sclerosis ([address]) G35    Marijuana smoker, continuous F12.90    Rash in adult R21    Acquired central hypothyroidism E03.8    Primary chronic progressive multiple sclerosis ([address]) G35    Lower paraplegia ([address]) G82.20    Encounter for medication monitoring Z51.81    OUMOU virus antibody positive R76.8    Family history of breast cancer Z80.3    Multiple sclerosis, primary chronic progressive ([address]) G35    Multiple sclerosis ([address]) G35    MS (multiple sclerosis) ([address]) G35     SURGICAL HISTORY     History reviewed. No pertinent surgical history.   CURRENT MEDICATIONS       Previous Medications    ACETAMINOPHEN (TYLENOL) 650 MG EXTENDED RELEASE TABLET    Take 650 mg by mouth every 8 hours as needed for Pain    BISACODYL (DULCOLAX)

## 2023-08-17 NOTE — ED NOTES
Patient laying quietly with eyes closed, arouses easily to speech. Patient denies any needs at this time has no s/s of distress at this time, will continue to monitor.        Gina Tenorio RN  08/17/23 3913

## 2023-08-17 NOTE — H&P
[facility]  Office: [address], DO, Josie Doverter, DO, Urbano Cook, DO, [facility] Blood, DO, Radha Maxwell MD, Kelvin Thornton MD, Simeon Ayon MD, Shona Nye MD,  Kenyon Sosa MD, Ami Fermin MD, Kevin Diaz, DO, Markos Peters MD,  Yamile Shipman MD, Kami Majano MD, Tigist Kirk, DO, Gordon Tripathi MD,  Evelio Hamilton, DO, Anselmo Pickens MD, Alex Whitfield MD, Delbert Beck MD, Genesis Ordonez MD,  Fareed Craven MD, Duane Campanile, MD, Heather Lockhart MD, Marcelino Faulkner, DO, Deanne Witt MD,  Kristina Childress MD, Macy Villa, CNP,  Ernst Rodas, CNP, Hema Roman, CNP, Hu Zee, CNP,  Yanick Manning, [facility], Jo Sue, CNP, Hugo Quiñones, CNP, Marvin Alaniz, CNP, Shelly Fischer, CNP, Kim Mejia, CNP, Kyler Quintanilla PA-C, Orquidea Oconnell, [facility], Eliezer Katz, CNP, Shine Archer, [facility]    HISTORY AND PHYSICAL EXAMINATION            Date:   8/17/2023  Patient name:  Addie Coles  Date of admission:  8/16/2023 11:34 PM  MRN:   [id number]  Account:  [de-identified]  YOB: 1990  PCP:    SAMARIA Romeo CNP  Room:   [place] [address]  Code Status:    Prior    Chief Complaint:     Chief Complaint   Patient presents with    Fatigue     MS flare up       History Obtained From:     patient, electronic medical record    History of Present Illness:     Addie Coles is a 26 y.o. Non- / non  female who presents with Fatigue (MS flare up)   and is admitted to the hospital for the management of Exacerbation of multiple sclerosis ([address]). Lisy Wesley is a 26year old female with medical history of MS who was brought to ED with complaints of joint pain. She states that she takes no home medications. She cannot remember when she last saw a neurologist and has no PCP.  She states she asked to be released from the nursing facility where she was

## 2023-08-17 NOTE — ED NOTES
Pt presenting to the ED with complaints of fatigue. Pt reports this has been going on the last 3 days. Pt reports she has been without medical care for the last 6 months. Pt is wheelchair bound due to [facility]. Pt is A&ox4.       Will Bush RN  08/17/23 9494

## 2023-08-17 NOTE — ED NOTES
Writer assisted pt with bedpan for urine sample, pt reports she is on her period, gross blood present in sample from menses.       Asad Bae RN  08/17/23 1273

## 2023-08-18 PROBLEM — R53.81 PHYSICAL DEBILITY: Status: ACTIVE | Noted: 2023-08-18

## 2023-08-18 PROCEDURE — G0378 HOSPITAL OBSERVATION PER HR: HCPCS

## 2023-08-18 PROCEDURE — 97535 SELF CARE MNGMENT TRAINING: CPT

## 2023-08-18 PROCEDURE — 97110 THERAPEUTIC EXERCISES: CPT

## 2023-08-18 PROCEDURE — 2580000003 HC RX 258: Performed by: NURSE PRACTITIONER

## 2023-08-18 PROCEDURE — 97112 NEUROMUSCULAR REEDUCATION: CPT

## 2023-08-18 PROCEDURE — 97530 THERAPEUTIC ACTIVITIES: CPT

## 2023-08-18 PROCEDURE — 96372 THER/PROPH/DIAG INJ SC/IM: CPT

## 2023-08-18 PROCEDURE — 99232 SBSQ HOSP IP/OBS MODERATE 35: CPT | Performed by: NURSE PRACTITIONER

## 2023-08-18 PROCEDURE — 6360000002 HC RX W HCPCS: Performed by: NURSE PRACTITIONER

## 2023-08-18 PROCEDURE — 99232 SBSQ HOSP IP/OBS MODERATE 35: CPT | Performed by: STUDENT IN AN ORGANIZED HEALTH CARE EDUCATION/TRAINING PROGRAM

## 2023-08-18 RX ORDER — ENOXAPARIN SODIUM 100 MG/ML
30 INJECTION SUBCUTANEOUS DAILY
Status: DISCONTINUED | OUTPATIENT
Start: 2023-08-19 | End: 2023-08-21 | Stop reason: HOSPADM

## 2023-08-18 RX ADMIN — SODIUM CHLORIDE, PRESERVATIVE FREE 10 ML: 5 INJECTION INTRAVENOUS at 20:13

## 2023-08-18 RX ADMIN — ENOXAPARIN SODIUM 40 MG: 100 INJECTION SUBCUTANEOUS at 08:52

## 2023-08-18 RX ADMIN — SODIUM CHLORIDE: 9 INJECTION, SOLUTION INTRAVENOUS at 07:34

## 2023-08-18 ASSESSMENT — ENCOUNTER SYMPTOMS
VOMITING: 0
COUGH: 0
COLOR CHANGE: 0
SHORTNESS OF BREATH: 0
NAUSEA: 0
CHEST TIGHTNESS: 0
ABDOMINAL PAIN: 0

## 2023-08-18 NOTE — PLAN OF CARE
Problem: Discharge Planning  Goal: Discharge to home or other facility with appropriate resources  8/18/2023 1235 by Jacklyn Ruby RN  Outcome: Progressing  Flowsheets (Taken 8/18/2023 0800)  Discharge to home or other facility with appropriate resources:   Arrange for needed discharge resources and transportation as appropriate   Identify barriers to discharge with patient and caregiver   Identify discharge learning needs (meds, wound care, etc)   Refer to discharge planning if patient needs post-hospital services based on physician order or complex needs related to functional status, cognitive ability or social support system  8/18/2023 0056 by Edin Hills RN  Outcome: Progressing     Problem: Pain  Goal: Verbalizes/displays adequate comfort level or baseline comfort level  8/18/2023 1235 by Jacklyn Ruby RN  Outcome: Progressing  8/18/2023 0056 by Edin Hills RN  Outcome: Progressing     Problem: Skin/Tissue Integrity  Goal: Absence of new skin breakdown  Description: 1. Monitor for areas of redness and/or skin breakdown  2. Assess vascular access sites hourly  3. Every 4-6 hours minimum:  Change oxygen saturation probe site  4. Every 4-6 hours:  If on nasal continuous positive airway pressure, respiratory therapy assess nares and determine need for appliance change or resting period.   8/18/2023 1235 by Jacklyn Ruby RN  Outcome: Progressing  8/18/2023 0056 by Edin Hills RN  Outcome: Progressing     Problem: Safety - Adult  Goal: Free from fall injury  8/18/2023 1235 by Jacklyn Ruby RN  Outcome: Progressing  8/18/2023 0056 by Edin Hills RN  Outcome: Progressing     Problem: ABCDS Injury Assessment  Goal: Absence of physical injury  8/18/2023 1235 by Jacklyn Ruby RN  Outcome: Progressing  8/18/2023 0056 by Edin Hills RN  Outcome: Progressing

## 2023-08-18 NOTE — PLAN OF CARE
Patient denies pain. Patient able to turn self. RN offered assistance. Waffle mattress in place. Problem: Discharge Planning  Goal: Discharge to home or other facility with appropriate resources  8/18/2023 0056 by Kimber Jasso RN  Outcome: Progressing  8/17/2023 1903 by Rogelio Sandoval RN  Outcome: Progressing  Flowsheets  Taken 8/17/2023 1853  Discharge to home or other facility with appropriate resources:   Identify barriers to discharge with patient and caregiver   Arrange for needed discharge resources and transportation as appropriate   Identify discharge learning needs (meds, wound care, etc)   Refer to discharge planning if patient needs post-hospital services based on physician order or complex needs related to functional status, cognitive ability or social support system  Taken 8/17/2023 1839  Discharge to home or other facility with appropriate resources:   Identify barriers to discharge with patient and caregiver   Arrange for needed discharge resources and transportation as appropriate   Identify discharge learning needs (meds, wound care, etc)   Refer to discharge planning if patient needs post-hospital services based on physician order or complex needs related to functional status, cognitive ability or social support system     Problem: Pain  Goal: Verbalizes/displays adequate comfort level or baseline comfort level  8/18/2023 0056 by Kimber Jasso RN  Outcome: Progressing  8/17/2023 1903 by Rogelio Sandoval RN  Outcome: Progressing     Problem: Skin/Tissue Integrity  Goal: Absence of new skin breakdown  Description: 1. Monitor for areas of redness and/or skin breakdown  2. Assess vascular access sites hourly  3. Every 4-6 hours minimum:  Change oxygen saturation probe site  4. Every 4-6 hours:  If on nasal continuous positive airway pressure, respiratory therapy assess nares and determine need for appliance change or resting period.   8/18/2023 0056 by Kimber Jasso RN  Outcome:

## 2023-08-19 PROCEDURE — 97535 SELF CARE MNGMENT TRAINING: CPT

## 2023-08-19 PROCEDURE — 97530 THERAPEUTIC ACTIVITIES: CPT

## 2023-08-19 PROCEDURE — 6360000002 HC RX W HCPCS: Performed by: NURSE PRACTITIONER

## 2023-08-19 PROCEDURE — 97112 NEUROMUSCULAR REEDUCATION: CPT

## 2023-08-19 PROCEDURE — 99231 SBSQ HOSP IP/OBS SF/LOW 25: CPT | Performed by: NURSE PRACTITIONER

## 2023-08-19 PROCEDURE — G0378 HOSPITAL OBSERVATION PER HR: HCPCS

## 2023-08-19 PROCEDURE — 2580000003 HC RX 258: Performed by: NURSE PRACTITIONER

## 2023-08-19 PROCEDURE — 96372 THER/PROPH/DIAG INJ SC/IM: CPT

## 2023-08-19 RX ADMIN — SODIUM CHLORIDE, PRESERVATIVE FREE 10 ML: 5 INJECTION INTRAVENOUS at 09:37

## 2023-08-19 RX ADMIN — ENOXAPARIN SODIUM 30 MG: 100 INJECTION SUBCUTANEOUS at 09:37

## 2023-08-19 ASSESSMENT — ENCOUNTER SYMPTOMS
SHORTNESS OF BREATH: 0
CHEST TIGHTNESS: 0
VOMITING: 0
NAUSEA: 0
COUGH: 0
COLOR CHANGE: 0
ABDOMINAL PAIN: 0

## 2023-08-20 PROCEDURE — 2580000003 HC RX 258: Performed by: NURSE PRACTITIONER

## 2023-08-20 PROCEDURE — 6370000000 HC RX 637 (ALT 250 FOR IP): Performed by: NURSE PRACTITIONER

## 2023-08-20 PROCEDURE — 96372 THER/PROPH/DIAG INJ SC/IM: CPT

## 2023-08-20 PROCEDURE — G0378 HOSPITAL OBSERVATION PER HR: HCPCS

## 2023-08-20 PROCEDURE — 6360000002 HC RX W HCPCS: Performed by: NURSE PRACTITIONER

## 2023-08-20 PROCEDURE — 99231 SBSQ HOSP IP/OBS SF/LOW 25: CPT | Performed by: NURSE PRACTITIONER

## 2023-08-20 RX ADMIN — SODIUM CHLORIDE, PRESERVATIVE FREE 10 ML: 5 INJECTION INTRAVENOUS at 19:39

## 2023-08-20 RX ADMIN — SODIUM CHLORIDE, PRESERVATIVE FREE 10 ML: 5 INJECTION INTRAVENOUS at 08:38

## 2023-08-20 RX ADMIN — ENOXAPARIN SODIUM 30 MG: 100 INJECTION SUBCUTANEOUS at 08:37

## 2023-08-20 RX ADMIN — POLYETHYLENE GLYCOL 3350 17 G: 17 POWDER, FOR SOLUTION ORAL at 14:54

## 2023-08-20 ASSESSMENT — ENCOUNTER SYMPTOMS
VOMITING: 0
CHEST TIGHTNESS: 0
SHORTNESS OF BREATH: 0
NAUSEA: 0
ABDOMINAL PAIN: 0
COLOR CHANGE: 0
COUGH: 0

## 2023-08-20 NOTE — PLAN OF CARE
Problem: Discharge Planning  Goal: Discharge to home or other facility with appropriate resources  8/20/2023 1128 by Natalie Diego RN  Outcome: Progressing  Flowsheets (Taken 8/20/2023 9311)  Discharge to home or other facility with appropriate resources: Identify barriers to discharge with patient and caregiver  8/20/2023 0556 by Angel Laird RN  Outcome: Adequate for Discharge  Flowsheets (Taken 8/19/2023 1900)  Discharge to home or other facility with appropriate resources:   Identify barriers to discharge with patient and caregiver   Arrange for needed discharge resources and transportation as appropriate   Identify discharge learning needs (meds, wound care, etc)     Problem: Pain  Goal: Verbalizes/displays adequate comfort level or baseline comfort level  8/20/2023 1128 by Natalie Diego RN  Outcome: Progressing  8/20/2023 0556 by Angel Laird RN  Outcome: Adequate for Discharge     Problem: Skin/Tissue Integrity  Goal: Absence of new skin breakdown  Description: 1. Monitor for areas of redness and/or skin breakdown  2. Assess vascular access sites hourly  3. Every 4-6 hours minimum:  Change oxygen saturation probe site  4. Every 4-6 hours:  If on nasal continuous positive airway pressure, respiratory therapy assess nares and determine need for appliance change or resting period.   8/20/2023 1128 by Natalie Diego RN  Outcome: Progressing  8/20/2023 0556 by Angel Laird RN  Outcome: Adequate for Discharge     Problem: Safety - Adult  Goal: Free from fall injury  8/20/2023 1128 by Natalie Diego RN  Outcome: Progressing  8/20/2023 0556 by Angel Laird RN  Outcome: Adequate for Discharge     Problem: ABCDS Injury Assessment  Goal: Absence of physical injury  8/20/2023 1128 by Natalie Diego RN  Outcome: Progressing  8/20/2023 0556 by Angel Laird RN  Outcome: Adequate for Discharge     Problem: Neurosensory - Adult  Goal: Achieves stable or improved neurological status  Outcome:

## 2023-08-20 NOTE — PLAN OF CARE
Patient had no complaints over night. Patient did well. Pain level assessment complete. Patient educated on pain scale and control interventions  PRN pain medication given per patient request  Patient instructed to call out with new onset of pain or unrelieved pain    Problem: Pain  Goal: Verbalizes/displays adequate comfort level or baseline comfort level  Outcome: Adequate for Discharge       Checked for incontinence every 2 hours and prn. Pericare as needed. Assisted to reposition off back frequently. On waffle mattress. Heels off bed with pillows. Problem: Skin/Tissue Integrity  Goal: Absence of new skin breakdown  Description: 1. Monitor for areas of redness and/or skin breakdown  2. Assess vascular access sites hourly  3. Every 4-6 hours minimum:  Change oxygen saturation probe site  4. Every 4-6 hours:  If on nasal continuous positive airway pressure, respiratory therapy assess nares and determine need for appliance change or resting period. Outcome: Adequate for Discharge     Siderails up x 2  Hourly rounding  Call light in reach  Instructed to call for assist before attempting out of bed.   Remains free from falls and accidental injury at this time   Floor free from obstacles  Bed is locked and in lowest position  Adequate lighting provided  Bed alarm on, [place] star and Stay with Me signs posted    Problem: Safety - Adult  Goal: Free from fall injury  Outcome: Adequate for Discharge

## 2023-08-21 VITALS
SYSTOLIC BLOOD PRESSURE: 103 MMHG | HEIGHT: 70 IN | HEART RATE: 67 BPM | RESPIRATION RATE: 18 BRPM | WEIGHT: 101.3 LBS | DIASTOLIC BLOOD PRESSURE: 72 MMHG | BODY MASS INDEX: 14.5 KG/M2 | TEMPERATURE: 98.6 F | OXYGEN SATURATION: 98 %

## 2023-08-21 PROCEDURE — 97535 SELF CARE MNGMENT TRAINING: CPT

## 2023-08-21 PROCEDURE — G0378 HOSPITAL OBSERVATION PER HR: HCPCS

## 2023-08-21 PROCEDURE — 96372 THER/PROPH/DIAG INJ SC/IM: CPT

## 2023-08-21 PROCEDURE — 99231 SBSQ HOSP IP/OBS SF/LOW 25: CPT | Performed by: NURSE PRACTITIONER

## 2023-08-21 PROCEDURE — 2580000003 HC RX 258: Performed by: NURSE PRACTITIONER

## 2023-08-21 PROCEDURE — 6360000002 HC RX W HCPCS: Performed by: NURSE PRACTITIONER

## 2023-08-21 PROCEDURE — 97530 THERAPEUTIC ACTIVITIES: CPT

## 2023-08-21 RX ADMIN — SODIUM CHLORIDE, PRESERVATIVE FREE 10 ML: 5 INJECTION INTRAVENOUS at 09:19

## 2023-08-21 RX ADMIN — ENOXAPARIN SODIUM 30 MG: 100 INJECTION SUBCUTANEOUS at 09:19

## 2023-08-21 ASSESSMENT — ENCOUNTER SYMPTOMS
CHEST TIGHTNESS: 0
VOMITING: 0
SHORTNESS OF BREATH: 0
COUGH: 0
COLOR CHANGE: 0
NAUSEA: 0
ABDOMINAL PAIN: 0

## 2023-08-21 NOTE — PLAN OF CARE
Problem: Safety - Adult  Goal: Free from fall injury  Outcome: Progressing  Note: Patient admitted with MS  Bed in lowest position, call light within reach, side railsx2, hourly rounding, non skid slippers, bed alarm

## 2023-08-21 NOTE — DISCHARGE SUMMARY
file.    Time Spent on discharge is  15 mins in patient examination, evaluation, counseling as well as medication reconciliation, prescriptions for required medications, discharge plan and follow up. Electronically signed by   Audrene Curling, APRN - CNP  8/21/2023  2:10 PM      Thank you SAMARIA Mills CNP for the opportunity to be involved in this patient's care.

## 2023-08-21 NOTE — PLAN OF CARE
Problem: Discharge Planning  Goal: Discharge to home or other facility with appropriate resources  8/20/2023 2002 by Ophelia Gutierrez RN  Outcome: Progressing  8/20/2023 1128 by Nori Coombs RN  Outcome: Progressing  Flowsheets (Taken 8/20/2023 4690)  Discharge to home or other facility with appropriate resources: Identify barriers to discharge with patient and caregiver     Problem: Pain  Goal: Verbalizes/displays adequate comfort level or baseline comfort level  8/20/2023 2002 by Ophelia Gutierrez RN  Outcome: Progressing  8/20/2023 1128 by Nori Coombs RN  Outcome: Progressing     Problem: Skin/Tissue Integrity  Goal: Absence of new skin breakdown  Description: 1. Monitor for areas of redness and/or skin breakdown  2. Assess vascular access sites hourly  3. Every 4-6 hours minimum:  Change oxygen saturation probe site  4. Every 4-6 hours:  If on nasal continuous positive airway pressure, respiratory therapy assess nares and determine need for appliance change or resting period.   8/20/2023 2002 by Ophelia Gutierrez RN  Outcome: Progressing  8/20/2023 1128 by Nori Coombs RN  Outcome: Progressing     Problem: Safety - Adult  Goal: Free from fall injury  8/20/2023 2002 by Ophelia Gutierrez RN  Outcome: Progressing  8/20/2023 1128 by Nori Coombs RN  Outcome: Progressing     Problem: ABCDS Injury Assessment  Goal: Absence of physical injury  8/20/2023 2002 by Ophelia Gutierrez RN  Outcome: Progressing  8/20/2023 1128 by Nroi Coombs RN  Outcome: Progressing     Problem: Neurosensory - Adult  Goal: Achieves stable or improved neurological status  8/20/2023 2002 by Ophelia Gutierrez RN  Outcome: Progressing  8/20/2023 1128 by Nori Coombs RN  Outcome: Progressing  Flowsheets (Taken 8/20/2023 8761)  Achieves stable or improved neurological status: Assess for and report changes in neurological status     Problem: Skin/Tissue Integrity - Adult  Goal: Skin integrity remains intact  8/20/2023 2002 by Ophelia Gutierrez RN  Outcome:

## 2023-08-21 NOTE — CARE COORDINATION
DC Planning    Pt initially requesting to go to rehab in [place] however explained she has [place] Medicaid and she is not covered for rehab in [place].     PT/OT recs IPR-Discussed [facility] pt agrees. Updated LSW.
Social Work-Contacted [facility]. They are attempting to verify insurance. Phone call to [facility]. HELP states that patient has Medicaid and it is not a managed care.  Celia Davila
Social Work-[facility] verified Medicaid and they can admit today. [facility] will transport at [address]. Orders faxed. Nurse to call report to [phone]. Pt is agreeable with dc plans.  Mukesh Smith
Social work: referral being sent to [facility]. Will need occ, Rx and discharge order at discharge.  Severo olvera
Social work: sent newest notes to [facility] for help with precert. Will need cristi at discharge.  Patricia olvera
Department  Ph: [phone] Fax: [phone]

## 2023-08-22 ENCOUNTER — HOSPITAL ENCOUNTER (OUTPATIENT)
Age: 33
Setting detail: SPECIMEN
Discharge: HOME | End: 2023-08-22

## 2023-08-22 LAB
ANION GAP SERPL CALCULATED.3IONS-SCNC: 11 MMOL/L (ref 9–17)
BUN SERPL-MCNC: 10 MG/DL (ref 6–20)
BUN/CREAT SERPL: 17 (ref 9–20)
CALCIUM SERPL-MCNC: 9 MG/DL (ref 8.6–10.4)
CHLORIDE SERPL-SCNC: 106 MMOL/L (ref 98–107)
CO2 SERPL-SCNC: 23 MMOL/L (ref 20–31)
CREAT SERPL-MCNC: 0.6 MG/DL (ref 0.5–0.9)
ERYTHROCYTE [DISTWIDTH] IN BLOOD BY AUTOMATED COUNT: 13.3 % (ref 11.8–14.4)
GFR SERPL CREATININE-BSD FRML MDRD: >60 ML/MIN/1.73M2
GLUCOSE SERPL-MCNC: 81 MG/DL (ref 70–99)
HCT VFR BLD AUTO: 39.3 % (ref 36.3–47.1)
HGB BLD-MCNC: 12.6 G/DL (ref 11.9–15.1)
MCH RBC QN AUTO: 29.2 PG (ref 25.2–33.5)
MCHC RBC AUTO-ENTMCNC: 32.1 G/DL (ref 28.4–34.8)
MCV RBC AUTO: 91 FL (ref 82.6–102.9)
NRBC BLD-RTO: 0 PER 100 WBC
PLATELET # BLD AUTO: 251 K/UL (ref 138–453)
PMV BLD AUTO: 10.7 FL (ref 8.1–13.5)
POTASSIUM SERPL-SCNC: 3.4 MMOL/L (ref 3.7–5.3)
RBC # BLD AUTO: 4.32 M/UL (ref 3.95–5.11)
SODIUM SERPL-SCNC: 140 MMOL/L (ref 135–144)
WBC OTHER # BLD: 4 K/UL (ref 3.5–11.3)

## 2023-08-22 PROCEDURE — 85027 COMPLETE CBC AUTOMATED: CPT

## 2023-08-22 PROCEDURE — P9603 ONE-WAY ALLOW PRORATED MILES: HCPCS

## 2023-08-22 PROCEDURE — 80048 BASIC METABOLIC PNL TOTAL CA: CPT

## 2023-08-22 PROCEDURE — 36415 COLL VENOUS BLD VENIPUNCTURE: CPT

## 2023-08-29 ENCOUNTER — HOSPITAL ENCOUNTER (OUTPATIENT)
Age: 33
Setting detail: SPECIMEN
Discharge: HOME | End: 2023-08-29

## 2023-08-29 LAB
ANION GAP SERPL CALCULATED.3IONS-SCNC: 7 MMOL/L (ref 9–17)
BUN SERPL-MCNC: 10 MG/DL (ref 6–20)
BUN/CREAT SERPL: 20 (ref 9–20)
CALCIUM SERPL-MCNC: 8.9 MG/DL (ref 8.6–10.4)
CHLORIDE SERPL-SCNC: 109 MMOL/L (ref 98–107)
CO2 SERPL-SCNC: 26 MMOL/L (ref 20–31)
CREAT SERPL-MCNC: 0.5 MG/DL (ref 0.5–0.9)
ERYTHROCYTE [DISTWIDTH] IN BLOOD BY AUTOMATED COUNT: 13.8 % (ref 11.8–14.4)
GFR SERPL CREATININE-BSD FRML MDRD: >60 ML/MIN/1.73M2
GLUCOSE SERPL-MCNC: 72 MG/DL (ref 70–99)
HCT VFR BLD AUTO: 37.7 % (ref 36.3–47.1)
HGB BLD-MCNC: 11.8 G/DL (ref 11.9–15.1)
MCH RBC QN AUTO: 29.1 PG (ref 25.2–33.5)
MCHC RBC AUTO-ENTMCNC: 31.3 G/DL (ref 28.4–34.8)
MCV RBC AUTO: 93.1 FL (ref 82.6–102.9)
NRBC BLD-RTO: 0 PER 100 WBC
PLATELET # BLD AUTO: 231 K/UL (ref 138–453)
PMV BLD AUTO: 10.6 FL (ref 8.1–13.5)
POTASSIUM SERPL-SCNC: 4.4 MMOL/L (ref 3.7–5.3)
RBC # BLD AUTO: 4.05 M/UL (ref 3.95–5.11)
SODIUM SERPL-SCNC: 142 MMOL/L (ref 135–144)
WBC OTHER # BLD: 5.3 K/UL (ref 3.5–11.3)

## 2023-08-29 PROCEDURE — 85027 COMPLETE CBC AUTOMATED: CPT

## 2023-08-29 PROCEDURE — P9603 ONE-WAY ALLOW PRORATED MILES: HCPCS

## 2023-08-29 PROCEDURE — 80048 BASIC METABOLIC PNL TOTAL CA: CPT

## 2023-08-29 PROCEDURE — 36415 COLL VENOUS BLD VENIPUNCTURE: CPT

## 2023-09-05 ENCOUNTER — HOSPITAL ENCOUNTER (OUTPATIENT)
Age: 33
Setting detail: SPECIMEN
Discharge: HOME | End: 2023-09-05

## 2023-09-05 LAB
ANION GAP SERPL CALCULATED.3IONS-SCNC: 11 MMOL/L (ref 9–17)
BUN SERPL-MCNC: 13 MG/DL (ref 6–20)
BUN/CREAT SERPL: 19 (ref 9–20)
CALCIUM SERPL-MCNC: 9.6 MG/DL (ref 8.6–10.4)
CHLORIDE SERPL-SCNC: 108 MMOL/L (ref 98–107)
CO2 SERPL-SCNC: 24 MMOL/L (ref 20–31)
CREAT SERPL-MCNC: 0.7 MG/DL (ref 0.5–0.9)
ERYTHROCYTE [DISTWIDTH] IN BLOOD BY AUTOMATED COUNT: 13.8 % (ref 11.8–14.4)
GFR SERPL CREATININE-BSD FRML MDRD: >60 ML/MIN/1.73M2
GLUCOSE SERPL-MCNC: 77 MG/DL (ref 70–99)
HCT VFR BLD AUTO: 39.4 % (ref 36.3–47.1)
HGB BLD-MCNC: 12.4 G/DL (ref 11.9–15.1)
MCH RBC QN AUTO: 29 PG (ref 25.2–33.5)
MCHC RBC AUTO-ENTMCNC: 31.5 G/DL (ref 28.4–34.8)
MCV RBC AUTO: 92.3 FL (ref 82.6–102.9)
NRBC BLD-RTO: 0 PER 100 WBC
PLATELET # BLD AUTO: 262 K/UL (ref 138–453)
PMV BLD AUTO: 10.8 FL (ref 8.1–13.5)
POTASSIUM SERPL-SCNC: 4.3 MMOL/L (ref 3.7–5.3)
RBC # BLD AUTO: 4.27 M/UL (ref 3.95–5.11)
SODIUM SERPL-SCNC: 143 MMOL/L (ref 135–144)
WBC OTHER # BLD: 5.4 K/UL (ref 3.5–11.3)

## 2023-09-05 PROCEDURE — 36415 COLL VENOUS BLD VENIPUNCTURE: CPT

## 2023-09-05 PROCEDURE — P9603 ONE-WAY ALLOW PRORATED MILES: HCPCS

## 2023-09-05 PROCEDURE — 85027 COMPLETE CBC AUTOMATED: CPT

## 2023-09-05 PROCEDURE — 80048 BASIC METABOLIC PNL TOTAL CA: CPT

## 2023-09-26 NOTE — PROGRESS NOTES
Left 5 5 5 5 5 5 0 0 0 0 0        Modified Miguelito Score: B T KF KE DF PF   Right 2 0 3 2 0 0   Left 0 0 2 1+ 0 0     Deep Tendon Reflexes:   Bicep Tricep BR Patellar Ankle   Right 3 3 3 3 3   Left 3 3 3 3 3     Additional Deep Tendon Reflexes:   [place]ton Supra-patellar Plantar    Right + - extensor   Left + - extensor       SENSORY:  Light touch: intact BUE and BLE  Pin:  No subjective sensory loss to pin. Temp: Intact  Vibration: Grossly intact in the BUE and BLE. Propioception: Intact at the great toe bilaterally. COORDINATION: Finger nose finger with dysmetria on the left. Deferred on the right due to weakness and spasticity. Fine finger movements very slow bilaterally. + truncal weakness and ataxia. GAIT:   Non-ambulatory. DATA:     LABS:     Component      Latest Ref Rng 9/5/2023   Sodium      135 - 144 mmol/L 143    Potassium      3.7 - 5.3 mmol/L 4.3    Chloride      98 - 107 mmol/L 108 (H)    CO2      20 - 31 mmol/L 24    Anion Gap      9 - 17 mmol/L 11    Glucose, Random      70 - 99 mg/dL 77    BUN,BUNPL      6 - 20 mg/dL 13    Creatinine      0.5 - 0.9 mg/dL 0.7    Est, Glom Filt Rate      >60 mL/min/1.73m2 >60    Bun/Cre Ratio      9 - 20  19    CALCIUM, SERUM, [id number]      8.6 - 10.4 mg/dL 9.6       Component      Latest Ref Rng 4/1/2021   Quantiferon TB Minus NIL      Negative  Negative    QuantiFERON Mitogen      IU/mL 9.11    Quantiferon TB1 Minus NIL      0.00 - 0.34 IU/mL 0.00    Quantiferon TB2 Minus NIL      0.00 - 0.34 IU/mL 0.00    QuantiFERON Nil      IU/mL 0.04    Hep B Core Total Ab      NONREACTIVE  NONREACTIVE        Legend:  (H) High    Outside labs from [facility] were reviewed dating back several years.      IMAGING (I personally reviewed the below imaging unless otherwise stated):     MRI brain with and without gadolinium 8/17/2023: There are several lesions noted at the cervical medullary junction and a large lesion in the medial medulla, there are multiple

## 2023-09-27 ENCOUNTER — OFFICE VISIT (OUTPATIENT)
Dept: NEUROLOGY | Age: 33
End: 2023-09-27
Payer: MEDICAID

## 2023-09-27 ENCOUNTER — HOSPITAL ENCOUNTER (OUTPATIENT)
Age: 33
Discharge: HOME | End: 2023-09-27
Payer: MEDICAID

## 2023-09-27 VITALS — HEIGHT: 70 IN | BODY MASS INDEX: 14.46 KG/M2 | WEIGHT: 101 LBS

## 2023-09-27 DIAGNOSIS — Z79.899 HIGH RISK MEDICATION USE: ICD-10-CM

## 2023-09-27 DIAGNOSIS — G35 MULTIPLE SCLEROSIS (HCC): ICD-10-CM

## 2023-09-27 DIAGNOSIS — G37.3 TRANSVERSE MYELITIS (HCC): ICD-10-CM

## 2023-09-27 DIAGNOSIS — G35 MULTIPLE SCLEROSIS, PRIMARY CHRONIC PROGRESSIVE (HCC): ICD-10-CM

## 2023-09-27 DIAGNOSIS — G35 MULTIPLE SCLEROSIS (HCC): Primary | ICD-10-CM

## 2023-09-27 DIAGNOSIS — M62.838 MUSCLE SPASM: ICD-10-CM

## 2023-09-27 PROCEDURE — 36415 COLL VENOUS BLD VENIPUNCTURE: CPT

## 2023-09-27 PROCEDURE — 99215 OFFICE O/P EST HI 40 MIN: CPT | Performed by: PSYCHIATRY & NEUROLOGY

## 2023-09-27 RX ORDER — BACLOFEN 5 MG/1
5 TABLET ORAL 2 TIMES DAILY
COMMUNITY
Start: 2023-09-01

## 2023-09-27 RX ORDER — TIZANIDINE 2 MG/1
2 TABLET ORAL NIGHTLY
Qty: 30 TABLET | Refills: 5 | Status: SHIPPED | OUTPATIENT
Start: 2023-09-27

## 2023-09-27 RX ORDER — ESCITALOPRAM OXALATE 5 MG/1
5 TABLET ORAL DAILY
Qty: 90 TABLET | Refills: 1 | Status: SHIPPED | OUTPATIENT
Start: 2023-09-27

## 2023-09-27 RX ORDER — HEPARIN SODIUM (PORCINE) LOCK FLUSH IV SOLN 100 UNIT/ML 100 UNIT/ML
500 SOLUTION INTRAVENOUS PRN
OUTPATIENT
Start: 2023-09-27

## 2023-09-27 ASSESSMENT — ENCOUNTER SYMPTOMS
CHEST TIGHTNESS: 0
COUGH: 0
TROUBLE SWALLOWING: 0
FACIAL SWELLING: 0
CONSTIPATION: 0
EYE PAIN: 0
EYE ITCHING: 0
EYE DISCHARGE: 0
SORE THROAT: 0
SHORTNESS OF BREATH: 0
DIARRHEA: 0
NAUSEA: 0
BACK PAIN: 0
ABDOMINAL PAIN: 0
COLOR CHANGE: 0

## 2023-10-03 LAB
SEND OUT REPORT: NORMAL
TEST NAME: NORMAL

## 2023-10-04 ENCOUNTER — TELEPHONE (OUTPATIENT)
Dept: NEUROLOGY | Age: 33
End: 2023-10-04

## 2023-10-04 NOTE — TELEPHONE ENCOUNTER
Armani Celestin only has [place]. I called [facility]elita as we were given an [facility] card on 9/27/23. That policy terminated on 2/7/24. She has [place].    I faxed an updated [address] [place] [address] form for [employer]

## 2023-10-04 NOTE — TELEPHONE ENCOUNTER
Dr. Leonel Hanson would like Carmen Tobar to continue with [facility]. She did sign a therapy plan., I called and spoke with Iwona at [facility]. She did see Dr. Cadence Allen order. She said she will call Anatoliy Ramirez and get her scheduled.

## 2023-10-17 ENCOUNTER — TELEPHONE (OUTPATIENT)
Dept: NEUROLOGY | Age: 33
End: 2023-10-17

## 2023-10-17 NOTE — TELEPHONE ENCOUNTER
I called and lm on Ghazal's moms number. . [facility] has been trying to reach them to schedule Ghazal's Ocrevus infusion.  I provided them the number to call [phone]

## 2023-10-18 NOTE — TELEPHONE ENCOUNTER
Lisa Murphy called to confirm that we had received the paper work from [employer] for [employer]. He is working with [employer] to help get Aettae the needed equipment. He said that her current manual wheel chair is worn out. The seat is actually torn. Doris will fall out this wheel chair and has to scoot around her home. Please review the paper work and advise. Partial Purse String (Intermediate) Text: Given the location of the defect and the characteristics of the surrounding skin an intermediate purse string closure was deemed most appropriate.  Undermining was performed circumferentially around the surgical defect.  A purse string suture was then placed and tightened. Wound tension of the circular defect prevented complete closure of the wound.

## 2024-01-06 ENCOUNTER — APPOINTMENT (OUTPATIENT)
Dept: MRI IMAGING | Age: 34
End: 2024-01-06
Payer: MEDICAID

## 2024-01-06 ENCOUNTER — HOSPITAL ENCOUNTER (OUTPATIENT)
Age: 34
Setting detail: OBSERVATION
Discharge: HOME | End: 2024-01-08
Attending: STUDENT IN AN ORGANIZED HEALTH CARE EDUCATION/TRAINING PROGRAM | Admitting: INTERNAL MEDICINE
Payer: MEDICAID

## 2024-01-06 DIAGNOSIS — G35 MS (MULTIPLE SCLEROSIS) (HCC): Primary | ICD-10-CM

## 2024-01-06 PROBLEM — R53.1 GENERALIZED WEAKNESS: Status: ACTIVE | Noted: 2023-08-18

## 2024-01-06 PROBLEM — E43 SEVERE MALNUTRITION (HCC): Status: ACTIVE | Noted: 2024-01-06

## 2024-01-06 LAB
ALBUMIN SERPL-MCNC: 4 G/DL (ref 3.5–5.2)
ALP SERPL-CCNC: 66 U/L (ref 35–104)
ALT SERPL-CCNC: 16 U/L (ref 5–33)
ANION GAP SERPL CALCULATED.3IONS-SCNC: 8 MMOL/L (ref 9–17)
AST SERPL-CCNC: 15 U/L
BASOPHILS # BLD: 0.05 K/UL (ref 0–0.2)
BASOPHILS NFR BLD: 1 % (ref 0–2)
BILIRUB SERPL-MCNC: 0.2 MG/DL (ref 0.3–1.2)
BILIRUB UR QL STRIP: NEGATIVE
BUN SERPL-MCNC: 14 MG/DL (ref 6–20)
BUN/CREAT SERPL: 28 (ref 9–20)
CALCIUM SERPL-MCNC: 9.2 MG/DL (ref 8.6–10.4)
CHLORIDE SERPL-SCNC: 106 MMOL/L (ref 98–107)
CLARITY UR: CLEAR
CO2 SERPL-SCNC: 25 MMOL/L (ref 20–31)
COLOR UR: YELLOW
CREAT SERPL-MCNC: 0.5 MG/DL (ref 0.5–0.9)
EOSINOPHIL # BLD: 0.42 K/UL (ref 0–0.44)
EOSINOPHILS RELATIVE PERCENT: 7 % (ref 1–4)
ERYTHROCYTE [DISTWIDTH] IN BLOOD BY AUTOMATED COUNT: 14.4 % (ref 11.8–14.4)
FLUAV RNA RESP QL NAA+PROBE: NOT DETECTED
FLUBV RNA RESP QL NAA+PROBE: NOT DETECTED
GFR SERPL CREATININE-BSD FRML MDRD: >60 ML/MIN/1.73M2
GLUCOSE SERPL-MCNC: 85 MG/DL (ref 70–99)
GLUCOSE UR STRIP-MCNC: NEGATIVE MG/DL
HCG SERPL QL: NEGATIVE
HCT VFR BLD AUTO: 40.8 % (ref 36.3–47.1)
HGB BLD-MCNC: 12.8 G/DL (ref 11.9–15.1)
HGB UR QL STRIP.AUTO: NEGATIVE
IMM GRANULOCYTES # BLD AUTO: 0.01 K/UL (ref 0–0.3)
IMM GRANULOCYTES NFR BLD: 0 %
KETONES UR STRIP-MCNC: NEGATIVE MG/DL
LEUKOCYTE ESTERASE UR QL STRIP: NEGATIVE
LYMPHOCYTES NFR BLD: 1.37 K/UL (ref 1.1–3.7)
LYMPHOCYTES RELATIVE PERCENT: 22 % (ref 24–43)
MAGNESIUM SERPL-MCNC: 2 MG/DL (ref 1.6–2.6)
MCH RBC QN AUTO: 29.4 PG (ref 25.2–33.5)
MCHC RBC AUTO-ENTMCNC: 31.4 G/DL (ref 28.4–34.8)
MCV RBC AUTO: 93.8 FL (ref 82.6–102.9)
MONOCYTES NFR BLD: 0.69 K/UL (ref 0.1–1.2)
MONOCYTES NFR BLD: 11 % (ref 3–12)
NEUTROPHILS NFR BLD: 59 % (ref 36–65)
NEUTS SEG NFR BLD: 3.67 K/UL (ref 1.5–8.1)
NITRITE UR QL STRIP: NEGATIVE
NRBC BLD-RTO: 0 PER 100 WBC
PH UR STRIP: 6 [PH] (ref 5–8)
PHOSPHATE SERPL-MCNC: 2.4 MG/DL (ref 2.6–4.5)
PLATELET # BLD AUTO: 228 K/UL (ref 138–453)
PMV BLD AUTO: 10.4 FL (ref 8.1–13.5)
POTASSIUM SERPL-SCNC: 4 MMOL/L (ref 3.7–5.3)
PROT SERPL-MCNC: 7.5 G/DL (ref 6.4–8.3)
PROT UR STRIP-MCNC: NEGATIVE MG/DL
RBC # BLD AUTO: 4.35 M/UL (ref 3.95–5.11)
SARS-COV-2 RNA RESP QL NAA+PROBE: NOT DETECTED
SODIUM SERPL-SCNC: 139 MMOL/L (ref 135–144)
SOURCE: NORMAL
SP GR UR STRIP: 1.02 (ref 1–1.03)
SPECIMEN DESCRIPTION: NORMAL
TSH SERPL DL<=0.05 MIU/L-ACNC: 0.33 UIU/ML (ref 0.3–5)
UROBILINOGEN UR STRIP-ACNC: NORMAL EU/DL (ref 0–1)
WBC OTHER # BLD: 6.2 K/UL (ref 3.5–11.3)

## 2024-01-06 PROCEDURE — APPSS30 APP SPLIT SHARED TIME 16-30 MINUTES: Performed by: NURSE PRACTITIONER

## 2024-01-06 PROCEDURE — 84100 ASSAY OF PHOSPHORUS: CPT

## 2024-01-06 PROCEDURE — G0378 HOSPITAL OBSERVATION PER HR: HCPCS

## 2024-01-06 PROCEDURE — A9579 GAD-BASE MR CONTRAST NOS,1ML: HCPCS | Performed by: PSYCHIATRY & NEUROLOGY

## 2024-01-06 PROCEDURE — 85025 COMPLETE CBC W/AUTO DIFF WBC: CPT

## 2024-01-06 PROCEDURE — 87636 SARSCOV2 & INF A&B AMP PRB: CPT

## 2024-01-06 PROCEDURE — 99285 EMERGENCY DEPT VISIT HI MDM: CPT

## 2024-01-06 PROCEDURE — 83735 ASSAY OF MAGNESIUM: CPT

## 2024-01-06 PROCEDURE — 72157 MRI CHEST SPINE W/O & W/DYE: CPT

## 2024-01-06 PROCEDURE — 99254 IP/OBS CNSLTJ NEW/EST MOD 60: CPT | Performed by: PSYCHIATRY & NEUROLOGY

## 2024-01-06 PROCEDURE — 96372 THER/PROPH/DIAG INJ SC/IM: CPT

## 2024-01-06 PROCEDURE — 96360 HYDRATION IV INFUSION INIT: CPT

## 2024-01-06 PROCEDURE — 84443 ASSAY THYROID STIM HORMONE: CPT

## 2024-01-06 PROCEDURE — 80053 COMPREHEN METABOLIC PANEL: CPT

## 2024-01-06 PROCEDURE — 81003 URINALYSIS AUTO W/O SCOPE: CPT

## 2024-01-06 PROCEDURE — 97163 PT EVAL HIGH COMPLEX 45 MIN: CPT

## 2024-01-06 PROCEDURE — 99222 1ST HOSP IP/OBS MODERATE 55: CPT | Performed by: INTERNAL MEDICINE

## 2024-01-06 PROCEDURE — 6360000002 HC RX W HCPCS: Performed by: NURSE PRACTITIONER

## 2024-01-06 PROCEDURE — 6360000004 HC RX CONTRAST MEDICATION: Performed by: PSYCHIATRY & NEUROLOGY

## 2024-01-06 PROCEDURE — 70553 MRI BRAIN STEM W/O & W/DYE: CPT

## 2024-01-06 PROCEDURE — 2580000003 HC RX 258: Performed by: NURSE PRACTITIONER

## 2024-01-06 PROCEDURE — 84703 CHORIONIC GONADOTROPIN ASSAY: CPT

## 2024-01-06 PROCEDURE — 72156 MRI NECK SPINE W/O & W/DYE: CPT

## 2024-01-06 PROCEDURE — 2580000003 HC RX 258: Performed by: STUDENT IN AN ORGANIZED HEALTH CARE EDUCATION/TRAINING PROGRAM

## 2024-01-06 RX ORDER — POTASSIUM CHLORIDE 7.45 MG/ML
10 INJECTION INTRAVENOUS PRN
Status: DISCONTINUED | OUTPATIENT
Start: 2024-01-06 | End: 2024-01-09 | Stop reason: HOSPADM

## 2024-01-06 RX ORDER — LEVOTHYROXINE SODIUM 0.05 MG/1
50 TABLET ORAL DAILY
Status: DISCONTINUED | OUTPATIENT
Start: 2024-01-06 | End: 2024-01-09 | Stop reason: HOSPADM

## 2024-01-06 RX ORDER — ONDANSETRON 2 MG/ML
4 INJECTION INTRAMUSCULAR; INTRAVENOUS EVERY 6 HOURS PRN
Status: DISCONTINUED | OUTPATIENT
Start: 2024-01-06 | End: 2024-01-09 | Stop reason: HOSPADM

## 2024-01-06 RX ORDER — POLYETHYLENE GLYCOL 3350 17 G/17G
17 POWDER, FOR SOLUTION ORAL DAILY PRN
Status: DISCONTINUED | OUTPATIENT
Start: 2024-01-06 | End: 2024-01-09 | Stop reason: HOSPADM

## 2024-01-06 RX ORDER — SODIUM CHLORIDE 0.9 % (FLUSH) 0.9 %
5-40 SYRINGE (ML) INJECTION EVERY 12 HOURS SCHEDULED
Status: DISCONTINUED | OUTPATIENT
Start: 2024-01-06 | End: 2024-01-09 | Stop reason: HOSPADM

## 2024-01-06 RX ORDER — SODIUM CHLORIDE 9 MG/ML
INJECTION, SOLUTION INTRAVENOUS CONTINUOUS
Status: DISCONTINUED | OUTPATIENT
Start: 2024-01-06 | End: 2024-01-06

## 2024-01-06 RX ORDER — ACETAMINOPHEN 650 MG/1
650 SUPPOSITORY RECTAL EVERY 6 HOURS PRN
Status: DISCONTINUED | OUTPATIENT
Start: 2024-01-06 | End: 2024-01-09 | Stop reason: HOSPADM

## 2024-01-06 RX ORDER — ONDANSETRON 4 MG/1
4 TABLET, ORALLY DISINTEGRATING ORAL EVERY 8 HOURS PRN
Status: DISCONTINUED | OUTPATIENT
Start: 2024-01-06 | End: 2024-01-09 | Stop reason: HOSPADM

## 2024-01-06 RX ORDER — SENNOSIDES A AND B 8.6 MG/1
1 TABLET, FILM COATED ORAL DAILY PRN
COMMUNITY

## 2024-01-06 RX ORDER — SODIUM CHLORIDE 9 MG/ML
INJECTION, SOLUTION INTRAVENOUS PRN
Status: DISCONTINUED | OUTPATIENT
Start: 2024-01-06 | End: 2024-01-09 | Stop reason: HOSPADM

## 2024-01-06 RX ORDER — ACETAMINOPHEN 325 MG/1
650 TABLET ORAL EVERY 6 HOURS PRN
Status: DISCONTINUED | OUTPATIENT
Start: 2024-01-06 | End: 2024-01-09 | Stop reason: HOSPADM

## 2024-01-06 RX ORDER — ENOXAPARIN SODIUM 100 MG/ML
30 INJECTION SUBCUTANEOUS DAILY
Status: DISCONTINUED | OUTPATIENT
Start: 2024-01-06 | End: 2024-01-09 | Stop reason: HOSPADM

## 2024-01-06 RX ORDER — 0.9 % SODIUM CHLORIDE 0.9 %
500 INTRAVENOUS SOLUTION INTRAVENOUS ONCE
Status: COMPLETED | OUTPATIENT
Start: 2024-01-06 | End: 2024-01-06

## 2024-01-06 RX ORDER — SODIUM CHLORIDE 0.9 % (FLUSH) 0.9 %
10 SYRINGE (ML) INJECTION PRN
Status: DISCONTINUED | OUTPATIENT
Start: 2024-01-06 | End: 2024-01-09 | Stop reason: HOSPADM

## 2024-01-06 RX ORDER — POTASSIUM CHLORIDE 20 MEQ/1
40 TABLET, EXTENDED RELEASE ORAL PRN
Status: DISCONTINUED | OUTPATIENT
Start: 2024-01-06 | End: 2024-01-09 | Stop reason: HOSPADM

## 2024-01-06 RX ORDER — MAGNESIUM SULFATE 1 G/100ML
1000 INJECTION INTRAVENOUS PRN
Status: DISCONTINUED | OUTPATIENT
Start: 2024-01-06 | End: 2024-01-09 | Stop reason: HOSPADM

## 2024-01-06 RX ADMIN — SODIUM CHLORIDE 500 ML: 9 INJECTION, SOLUTION INTRAVENOUS at 04:31

## 2024-01-06 RX ADMIN — ENOXAPARIN SODIUM 30 MG: 100 INJECTION SUBCUTANEOUS at 09:35

## 2024-01-06 RX ADMIN — SODIUM CHLORIDE, PRESERVATIVE FREE 10 ML: 5 INJECTION INTRAVENOUS at 08:42

## 2024-01-06 RX ADMIN — GADOTERIDOL 10 ML: 279.3 INJECTION, SOLUTION INTRAVENOUS at 19:00

## 2024-01-06 RX ADMIN — SODIUM CHLORIDE, PRESERVATIVE FREE 10 ML: 5 INJECTION INTRAVENOUS at 20:09

## 2024-01-06 RX ADMIN — SODIUM CHLORIDE: 9 INJECTION, SOLUTION INTRAVENOUS at 08:42

## 2024-01-06 ASSESSMENT — PAIN SCALES - GENERAL
PAINLEVEL_OUTOF10: 3
PAINLEVEL_OUTOF10: 0
PAINLEVEL_OUTOF10: 0
PAINLEVEL_OUTOF10: 8
PAINLEVEL_OUTOF10: 3
PAINLEVEL_OUTOF10: 3

## 2024-01-06 ASSESSMENT — PAIN - FUNCTIONAL ASSESSMENT: PAIN_FUNCTIONAL_ASSESSMENT: 0-10

## 2024-01-06 NOTE — DISCHARGE INSTR - COC
Rehab): Fair    Recommended Labs or Other Treatments After Discharge: PT and OT evaluate and treat, follow-up with neurology and resume Ocrevus as appropriate    Physician Certification: I certify the above information and transfer of Ghazal Mcgowan  is necessary for the continuing treatment of the diagnosis listed and that she requires Acute Rehab for less 30 days.     Update Admission H&P: No change in H&P    PHYSICIAN SIGNATURE:  Electronically signed by Jhon Hinkle DO on 1/7/24 at 1:21 PM EST

## 2024-01-06 NOTE — PLAN OF CARE
Patient resting in bed, patient worked with therapy. Neurology was consulted for patient and MRI ordered. Patient agreeable to In patient rehab.   Problem: Discharge Planning  Goal: Discharge to home or other facility with appropriate resources  Outcome: Progressing  Flowsheets  Taken 1/6/2024 0814 by Lillie Tan RN  Discharge to home or other facility with appropriate resources: Identify barriers to discharge with patient and caregiver  Taken 1/6/2024 0654 by Farzana De Leon RN  Discharge to home or other facility with appropriate resources:   Identify barriers to discharge with patient and caregiver   Identify discharge learning needs (meds, wound care, etc)   Refer to discharge planning if patient needs post-hospital services based on physician order or complex needs related to functional status, cognitive ability or social support system   Arrange for needed discharge resources and transportation as appropriate     Problem: Pain  Goal: Verbalizes/displays adequate comfort level or baseline comfort level  Outcome: Progressing  Flowsheets (Taken 1/6/2024 0813)  Verbalizes/displays adequate comfort level or baseline comfort level:   Assess pain using appropriate pain scale   Encourage patient to monitor pain and request assistance   Administer analgesics based on type and severity of pain and evaluate response   Implement non-pharmacological measures as appropriate and evaluate response     Problem: Safety - Adult  Goal: Free from fall injury  Outcome: Progressing  Flowsheets (Taken 1/6/2024 1629)  Free From Fall Injury:   Instruct family/caregiver on patient safety   Based on caregiver fall risk screen, instruct family/caregiver to ask for assistance with transferring infant if caregiver noted to have fall risk factors

## 2024-01-06 NOTE — CARE COORDINATION
Social work: spoke to pt who was very pleasant and was able to advise that she has [facility] in the past. She is willing to go to either at this time if needed per pt.Pt  states her mom who care for her had to go away for the weekend and will be back Tuesday. Left no messages here for placement. Pt today is alert and is cooperative with rehab plan. Will try to reach mom when a facility advises they have a bed and if pt is ready to go there. If MRI is ordered it may not be able to be done until Monday. This may hold up pt till that is accomplished. Will need cristi and Rx for snf if pt going there.  If ARU only cristi needed.  With pt's permission calling [facility] to see if appropriate for the ARU at discharge. Taty olvera

## 2024-01-06 NOTE — H&P
I have seen and examined Ghazal Mcgowan and the pablo elements of all parts of the encounter have been performed by me.  I agree with the assessment, plan and orders as documented by the Advanced Practice Provider. Any modifications to the exam findings and the plan of treatment is as below and the final version is my approved version of the assessment.    Additional Comments: Patient seen and evaluated independently, please see LYDIA's note from 615 this morning.  This is a 33-year-old female that presents with complaints of leg pain and weakness, concern for possible MS flare.  She reports longstanding MS and follows with a Dr. Salmeron for neurology.  She otherwise denies any symptoms of fevers, chills, nausea or vomiting or other acute complaints.    Neck is supple without JVD  Heart is regular rhythm and rate  Lungs are diminished but clear  Is 1+ edema of the bilateral ankles  Neurologic she has global weakness, awake and alert, oriented x 4    Principal Problem:    Exacerbation of multiple sclerosis (HCC)  Active Problems:    Acquired central hypothyroidism    Primary chronic progressive multiple sclerosis (HCC)    Protein-calorie malnutrition (HCC)    Smoker    Generalized weakness  Resolved Problems:    MS (multiple sclerosis) (HCC)    Admit inpatient  Neurology evaluation  Resume home medications, has been off medications for some time  PT and OT  PM&R consult  Approximately 50 minutes spent with patient evaluation and chart review    Electronically signed by Jhon Hinkle DO on 1/6/2024 at 2:07 PM    
Status: She is alert.      GCS: GCS eye subscore is 4. GCS verbal subscore is 5.       Investigations:      Laboratory Testing:  Recent Results (from the past 24 hour(s))   CBC with Auto Differential    Collection Time: 01/06/24  4:25 AM   Result Value Ref Range    WBC 6.2 3.5 - 11.3 k/uL    RBC 4.35 3.95 - 5.11 m/uL    Hemoglobin 12.8 11.9 - 15.1 g/dL    Hematocrit 40.8 36.3 - 47.1 %    MCV 93.8 82.6 - 102.9 fL    MCH 29.4 25.2 - 33.5 pg    MCHC 31.4 28.4 - 34.8 g/dL    RDW 14.4 11.8 - 14.4 %    Platelets 228 138 - 453 k/uL    MPV 10.4 8.1 - 13.5 fL    NRBC Automated 0.0 0.0 per 100 WBC    Neutrophils % 59 36 - 65 %    Lymphocytes % 22 (L) 24 - 43 %    Monocytes % 11 3 - 12 %    Eosinophils % 7 (H) 1 - 4 %    Basophils % 1 0 - 2 %    Immature Granulocytes 0 0 %    Neutrophils Absolute 3.67 1.50 - 8.10 k/uL    Lymphocytes Absolute 1.37 1.10 - 3.70 k/uL    Monocytes Absolute 0.69 0.10 - 1.20 k/uL    Eosinophils Absolute 0.42 0.00 - 0.44 k/uL    Basophils Absolute 0.05 0.00 - 0.20 k/uL    Absolute Immature Granulocyte 0.01 0.00 - 0.30 k/uL   Comprehensive Metabolic Panel    Collection Time: 01/06/24  4:25 AM   Result Value Ref Range    Sodium 139 135 - 144 mmol/L    Potassium 4.0 3.7 - 5.3 mmol/L    Chloride 106 98 - 107 mmol/L    CO2 25 20 - 31 mmol/L    Anion Gap 8 (L) 9 - 17 mmol/L    Glucose 85 70 - 99 mg/dL    BUN 14 6 - 20 mg/dL    Creatinine 0.5 0.5 - 0.9 mg/dL    Est, Glom Filt Rate >60 >60 mL/min/1.73m2    Bun/Cre Ratio 28 (H) 9 - 20    Calcium 9.2 8.6 - 10.4 mg/dL    Total Protein 7.5 6.4 - 8.3 g/dL    Albumin 4.0 3.5 - 5.2 g/dL    Total Bilirubin 0.2 (L) 0.3 - 1.2 mg/dL    Alkaline Phosphatase 66 35 - 104 U/L    ALT 16 5 - 33 U/L    AST 15 <32 U/L   HCG, SERUM, QUALITATIVE    Collection Time: 01/06/24  4:25 AM   Result Value Ref Range    hCG Qual NEGATIVE NEGATIVE   Phosphorus    Collection Time: 01/06/24  4:25 AM   Result Value Ref Range    Phosphorus 2.4 (L) 2.6 - 4.5 mg/dL   Magnesium    Collection Time:

## 2024-01-06 NOTE — CONSULTS
[facility] Neurology   IN-PATIENT SERVICE      NEUROLOGY CONSULT  NOTE            Date:   1/6/2024  Patient name:  Ghazal Mcgowan  Date of admission:  1/6/2024  YOB: 1990      Chief Complaint:     Chief Complaint   Patient presents with    Back Pain    Leg Pain    Ankle Pain     Hx MS       Reason for Consult:      Multiple sclerosis    History of Present Illness:     33-year-old female with past medical history of primary progressive MS with baseline significant deficits, history of hypothyroidism, who presents for generalized weakness.  History obtained from patient and chart review.  Patient is a poor historian.    She has been established with our neurology group over the last few years.  Most recently, she saw Dr. Salmeron on 9/27/2023.    She was diagnosed with MS in her early 20s.  At the time, she started having progressive right-sided weakness.  Subsequently, had difficulty walking.  Ultimately diagnosed with MS in [place].  She has been previously maintained on Copaxone and fingolimod.  Most recently, has been on Ocrevus, last infusion in ?April of this year per patient.  At her last visit with Dr. Salmeron, she was recommended to restart ocrelizumab.  Unfortunately, she has been unable to restart due to initial insurance issues but subsequently appears office was trying to reach patient and mom to schedule infusions but did not get a hold of them.    Of note, she also presented to the hospital in August 2023.  At the time, repeat MRI brain and cervical spine stable with no acute demyelination.  She was recommended to continue Ocrevus outpatient along with aggressive PT/OT.    Patient states since her last visit, her MS symptoms have remained relatively stable.  However, she has continued to have generalized progressive weakness, remains dependent on ADLs.  She presented to the hospital as her mom who is her primary caregiver left [facility] for her birthday and patient was unable to take care of herself at

## 2024-01-06 NOTE — ED NOTES
Presents via EMS with c/o back pain and bilateral ankle and leg pain. Pt has a history of MS. States she is not sure what is causing the pain. Pt is non ambulatory.

## 2024-01-07 LAB
ANION GAP SERPL CALCULATED.3IONS-SCNC: 6 MMOL/L (ref 9–17)
BUN SERPL-MCNC: 15 MG/DL (ref 6–20)
BUN/CREAT SERPL: 30 (ref 9–20)
CALCIUM SERPL-MCNC: 8.7 MG/DL (ref 8.6–10.4)
CHLORIDE SERPL-SCNC: 106 MMOL/L (ref 98–107)
CO2 SERPL-SCNC: 25 MMOL/L (ref 20–31)
CREAT SERPL-MCNC: 0.5 MG/DL (ref 0.5–0.9)
GFR SERPL CREATININE-BSD FRML MDRD: >60 ML/MIN/1.73M2
GLUCOSE SERPL-MCNC: 77 MG/DL (ref 70–99)
POTASSIUM SERPL-SCNC: 4.1 MMOL/L (ref 3.7–5.3)
SODIUM SERPL-SCNC: 137 MMOL/L (ref 135–144)
TSH SERPL DL<=0.05 MIU/L-ACNC: 0.76 UIU/ML (ref 0.3–5)

## 2024-01-07 PROCEDURE — 6370000000 HC RX 637 (ALT 250 FOR IP): Performed by: NURSE PRACTITIONER

## 2024-01-07 PROCEDURE — 99232 SBSQ HOSP IP/OBS MODERATE 35: CPT | Performed by: INTERNAL MEDICINE

## 2024-01-07 PROCEDURE — 84443 ASSAY THYROID STIM HORMONE: CPT

## 2024-01-07 PROCEDURE — 6360000002 HC RX W HCPCS: Performed by: NURSE PRACTITIONER

## 2024-01-07 PROCEDURE — 2580000003 HC RX 258: Performed by: NURSE PRACTITIONER

## 2024-01-07 PROCEDURE — G0378 HOSPITAL OBSERVATION PER HR: HCPCS

## 2024-01-07 PROCEDURE — 99232 SBSQ HOSP IP/OBS MODERATE 35: CPT | Performed by: PSYCHIATRY & NEUROLOGY

## 2024-01-07 PROCEDURE — 80048 BASIC METABOLIC PNL TOTAL CA: CPT

## 2024-01-07 PROCEDURE — 36415 COLL VENOUS BLD VENIPUNCTURE: CPT

## 2024-01-07 PROCEDURE — 96372 THER/PROPH/DIAG INJ SC/IM: CPT

## 2024-01-07 PROCEDURE — 6370000000 HC RX 637 (ALT 250 FOR IP): Performed by: INTERNAL MEDICINE

## 2024-01-07 RX ORDER — BACLOFEN 10 MG/1
5 TABLET ORAL 2 TIMES DAILY
Status: DISCONTINUED | OUTPATIENT
Start: 2024-01-07 | End: 2024-01-09 | Stop reason: HOSPADM

## 2024-01-07 RX ORDER — GABAPENTIN 100 MG/1
100 CAPSULE ORAL 3 TIMES DAILY
Status: DISCONTINUED | OUTPATIENT
Start: 2024-01-07 | End: 2024-01-09 | Stop reason: HOSPADM

## 2024-01-07 RX ORDER — ESCITALOPRAM OXALATE 10 MG/1
5 TABLET ORAL DAILY
Status: DISCONTINUED | OUTPATIENT
Start: 2024-01-07 | End: 2024-01-09 | Stop reason: HOSPADM

## 2024-01-07 RX ORDER — SENNOSIDES A AND B 8.6 MG/1
1 TABLET, FILM COATED ORAL DAILY PRN
Status: DISCONTINUED | OUTPATIENT
Start: 2024-01-07 | End: 2024-01-09 | Stop reason: HOSPADM

## 2024-01-07 RX ORDER — TIZANIDINE 2 MG/1
2 TABLET ORAL NIGHTLY
Status: DISCONTINUED | OUTPATIENT
Start: 2024-01-07 | End: 2024-01-09 | Stop reason: HOSPADM

## 2024-01-07 RX ADMIN — ESCITALOPRAM OXALATE 5 MG: 10 TABLET ORAL at 14:47

## 2024-01-07 RX ADMIN — SODIUM CHLORIDE, PRESERVATIVE FREE 10 ML: 5 INJECTION INTRAVENOUS at 21:34

## 2024-01-07 RX ADMIN — ENOXAPARIN SODIUM 30 MG: 100 INJECTION SUBCUTANEOUS at 08:37

## 2024-01-07 RX ADMIN — TIZANIDINE 2 MG: 2 TABLET ORAL at 21:33

## 2024-01-07 RX ADMIN — LEVOTHYROXINE SODIUM 50 MCG: 0.05 TABLET ORAL at 06:08

## 2024-01-07 RX ADMIN — BACLOFEN 5 MG: 10 TABLET ORAL at 14:47

## 2024-01-07 RX ADMIN — GABAPENTIN 100 MG: 100 CAPSULE ORAL at 21:33

## 2024-01-07 RX ADMIN — GABAPENTIN 100 MG: 100 CAPSULE ORAL at 14:47

## 2024-01-07 RX ADMIN — SODIUM CHLORIDE, PRESERVATIVE FREE 10 ML: 5 INJECTION INTRAVENOUS at 08:38

## 2024-01-07 RX ADMIN — BACLOFEN 5 MG: 10 TABLET ORAL at 21:33

## 2024-01-07 ASSESSMENT — PAIN SCALES - GENERAL
PAINLEVEL_OUTOF10: 0
PAINLEVEL_OUTOF10: 7
PAINLEVEL_OUTOF10: 0

## 2024-01-07 NOTE — CARE COORDINATION
Social work: spoke to [place] rep for [facility] they are reviewing and waiting for OT note. Sent PT notes and progress notes. Taty olvera

## 2024-01-07 NOTE — PLAN OF CARE
Problem: Discharge Planning  Goal: Discharge to home or other facility with appropriate resources  1/7/2024 0054 by Lea Stewart RN  Outcome: Progressing  Note: Patient will be discharged when the patient meets discharge criteria to go home or a facility   1/6/2024 1629 by Lillie Tan RN  Outcome: Progressing  Flowsheets  Taken 1/6/2024 0814 by Lillie Tan RN  Discharge to home or other facility with appropriate resources: Identify barriers to discharge with patient and caregiver  Taken 1/6/2024 0654 by Farzana De Leon RN  Discharge to home or other facility with appropriate resources:   Identify barriers to discharge with patient and caregiver   Identify discharge learning needs (meds, wound care, etc)   Refer to discharge planning if patient needs post-hospital services based on physician order or complex needs related to functional status, cognitive ability or social support system   Arrange for needed discharge resources and transportation as appropriate     Problem: Pain  Goal: Verbalizes/displays adequate comfort level or baseline comfort level  1/7/2024 0054 by Lea Stewart RN  Outcome: Progressing  Note: Pain is being managed with rest, repositioning and medication   1/6/2024 1629 by Lillie Tan RN  Outcome: Progressing  Flowsheets (Taken 1/6/2024 0813)  Verbalizes/displays adequate comfort level or baseline comfort level:   Assess pain using appropriate pain scale   Encourage patient to monitor pain and request assistance   Administer analgesics based on type and severity of pain and evaluate response   Implement non-pharmacological measures as appropriate and evaluate response     Problem: Safety - Adult  Goal: Free from fall injury  1/7/2024 0054 by Lea Stewart RN  Outcome: Progressing  Note: Call light within reach, bed alarm on, socks on and bed in lowest position   1/6/2024 1629 by Lillie Tan RN  Outcome: Progressing  Flowsheets (Taken 1/6/2024 1629)  Free From Fall Injury:   Instruct

## 2024-01-07 NOTE — PLAN OF CARE
Patient awaiting placement.  Problem: Discharge Planning  Goal: Discharge to home or other facility with appropriate resources  Outcome: Progressing  Flowsheets  Taken 1/7/2024 1650  Discharge to home or other facility with appropriate resources: Identify barriers to discharge with patient and caregiver  Taken 1/7/2024 0837  Discharge to home or other facility with appropriate resources: Identify barriers to discharge with patient and caregiver     Problem: Pain  Goal: Verbalizes/displays adequate comfort level or baseline comfort level  Outcome: Progressing  Flowsheets  Taken 1/7/2024 1650  Verbalizes/displays adequate comfort level or baseline comfort level:   Encourage patient to monitor pain and request assistance   Assess pain using appropriate pain scale  Taken 1/7/2024 0823  Verbalizes/displays adequate comfort level or baseline comfort level:   Encourage patient to monitor pain and request assistance   Assess pain using appropriate pain scale   Administer analgesics based on type and severity of pain and evaluate response   Implement non-pharmacological measures as appropriate and evaluate response     Problem: Safety - Adult  Goal: Free from fall injury  Outcome: Progressing  Flowsheets (Taken 1/7/2024 1650)  Free From Fall Injury:   Based on caregiver fall risk screen, instruct family/caregiver to ask for assistance with transferring infant if caregiver noted to have fall risk factors   Instruct family/caregiver on patient safety     Problem: Skin/Tissue Integrity  Goal: Absence of new skin breakdown  Description: 1.  Monitor for areas of redness and/or skin breakdown  2.  Assess vascular access sites hourly  3.  Every 4-6 hours minimum:  Change oxygen saturation probe site  4.  Every 4-6 hours:  If on nasal continuous positive airway pressure, respiratory therapy assess nares and determine need for appliance change or resting period.  Outcome: Progressing

## 2024-01-08 VITALS
SYSTOLIC BLOOD PRESSURE: 104 MMHG | HEART RATE: 71 BPM | RESPIRATION RATE: 16 BRPM | OXYGEN SATURATION: 100 % | DIASTOLIC BLOOD PRESSURE: 66 MMHG | BODY MASS INDEX: 14.17 KG/M2 | WEIGHT: 99 LBS | TEMPERATURE: 98.1 F | HEIGHT: 70 IN

## 2024-01-08 PROCEDURE — G0378 HOSPITAL OBSERVATION PER HR: HCPCS

## 2024-01-08 PROCEDURE — 96372 THER/PROPH/DIAG INJ SC/IM: CPT

## 2024-01-08 PROCEDURE — 99222 1ST HOSP IP/OBS MODERATE 55: CPT | Performed by: STUDENT IN AN ORGANIZED HEALTH CARE EDUCATION/TRAINING PROGRAM

## 2024-01-08 PROCEDURE — 97112 NEUROMUSCULAR REEDUCATION: CPT

## 2024-01-08 PROCEDURE — 6370000000 HC RX 637 (ALT 250 FOR IP): Performed by: INTERNAL MEDICINE

## 2024-01-08 PROCEDURE — 6370000000 HC RX 637 (ALT 250 FOR IP): Performed by: NURSE PRACTITIONER

## 2024-01-08 PROCEDURE — 2580000003 HC RX 258: Performed by: NURSE PRACTITIONER

## 2024-01-08 PROCEDURE — 6360000002 HC RX W HCPCS: Performed by: NURSE PRACTITIONER

## 2024-01-08 PROCEDURE — 99231 SBSQ HOSP IP/OBS SF/LOW 25: CPT | Performed by: PSYCHIATRY & NEUROLOGY

## 2024-01-08 PROCEDURE — 97530 THERAPEUTIC ACTIVITIES: CPT

## 2024-01-08 PROCEDURE — 99232 SBSQ HOSP IP/OBS MODERATE 35: CPT | Performed by: NURSE PRACTITIONER

## 2024-01-08 PROCEDURE — 97535 SELF CARE MNGMENT TRAINING: CPT

## 2024-01-08 PROCEDURE — 97166 OT EVAL MOD COMPLEX 45 MIN: CPT

## 2024-01-08 RX ADMIN — GABAPENTIN 100 MG: 100 CAPSULE ORAL at 14:28

## 2024-01-08 RX ADMIN — SODIUM CHLORIDE, PRESERVATIVE FREE 10 ML: 5 INJECTION INTRAVENOUS at 08:00

## 2024-01-08 RX ADMIN — ESCITALOPRAM OXALATE 5 MG: 10 TABLET ORAL at 07:58

## 2024-01-08 RX ADMIN — ENOXAPARIN SODIUM 30 MG: 100 INJECTION SUBCUTANEOUS at 08:00

## 2024-01-08 RX ADMIN — LEVOTHYROXINE SODIUM 50 MCG: 0.05 TABLET ORAL at 06:02

## 2024-01-08 RX ADMIN — BACLOFEN 5 MG: 10 TABLET ORAL at 07:59

## 2024-01-08 RX ADMIN — GABAPENTIN 100 MG: 100 CAPSULE ORAL at 07:58

## 2024-01-08 ASSESSMENT — ENCOUNTER SYMPTOMS
DIARRHEA: 0
SHORTNESS OF BREATH: 1
CHEST TIGHTNESS: 0
NAUSEA: 0
SHORTNESS OF BREATH: 0
VOMITING: 0
CONSTIPATION: 0
COUGH: 0
ABDOMINAL PAIN: 0

## 2024-01-08 NOTE — PLAN OF CARE
Problem: Discharge Planning  Goal: Discharge to home or other facility with appropriate resources  1/8/2024 0405 by Christina Arizmendi RN  Outcome: Progressing     Problem: Pain  Goal: Verbalizes/displays adequate comfort level or baseline comfort level  1/8/2024 0405 by Christina Arizmendi RN  Outcome: Progressing     Problem: Safety - Adult  Goal: Free from fall injury  1/8/2024 0405 by Christina Arizmendi RN  Outcome: Progressing     Problem: Skin/Tissue Integrity  Goal: Absence of new skin breakdown  Description: 1.  Monitor for areas of redness and/or skin breakdown  2.  Assess vascular access sites hourly  3.  Every 4-6 hours minimum:  Change oxygen saturation probe site  4.  Every 4-6 hours:  If on nasal continuous positive airway pressure, respiratory therapy assess nares and determine need for appliance change or resting period.  1/8/2024 0405 by Christina Arizmendi, RN  Outcome: Progressing     Problem: Skin/Tissue Integrity - Adult  Goal: Skin integrity remains intact  1/8/2024 0405 by Christina Arizmendi RN  Outcome: Progressing     Problem: Musculoskeletal - Adult  Goal: Return mobility to safest level of function  1/8/2024 0405 by Christina Arizmendi, RN  Outcome: Progressing

## 2024-01-08 NOTE — CARE COORDINATION
Social work:  [facility] reviewing.  Noted PMR consult on 1/6kevin to Dr Navas to review.     UPDATE 13:00- [facility], [facility] and [facility] cannot accept, patient too low level for acute rehab.  Met with patient, she agreed to referral to to [facility] or [facility].  Referral faxed.     UPDATE 17:00- [facility] cannot accept d/t past behavior.  Met with patient to inform, she states her mom is coming back into town today and she will return home with her opposed to going to rehab.

## 2024-01-08 NOTE — PLAN OF CARE
Her mom will be here about 8 Pm to get her.  Her brother picks her up and puts her in her WC or car or whatever she needs   Problem: Discharge Planning  Goal: Discharge to home or other facility with appropriate resources  Outcome: Progressing     Problem: Pain  Goal: Verbalizes/displays adequate comfort level or baseline comfort level  Outcome: Progressing

## 2024-01-08 NOTE — CONSULTS
Physical Medicine & Rehabilitation  Consult Note      Admitting Physician:  No att. providers found    Primary Care Provider:  Shanika Boss, SAMARIA Bhatia CNP     Reason for Consult:  Acute Inpatient Rehabilitation    Chief Complaint:  Weakness, pain    History of Present Illness:  Referring Provider is requesting an evaluation for appropriate placement upon discharge from acute care. History from chart review and patient.    Ghazal Mcgowan is a 33 y.o. right-handed female with history of multiple sclerosis, hypothyroidism, asthma admitted to  [facility]  on 1/6/2024.      She initially presented with generalized weakness and pain.  Additionally, her mother usually takes care of her at home but reportedly went out of town, and the patient did not have anyone to care for her.  MRI brain showed no acute intracranial abnormality; it did not show any evidence of active demyelination.  MRI cervical spine and MRI thoracic spine also did not show any evidence of active demyelination.    She reports diffuse pain throughout the limbs.  She also notes weakness, which is worse in the right upper and lower limbs compared to the left.  She states that she feels numbness/tingling at the shoulders and knees.  She reports minor chest pain and shortness of breath as well.  She denies any headache.    Review of Systems:  Review of Systems   Constitutional:  Negative for fever.   Respiratory:  Positive for shortness of breath.    Cardiovascular:  Positive for chest pain.   Gastrointestinal:  Negative for abdominal pain.   Musculoskeletal:         +diffuse body pain   Neurological:  Positive for sensory change and weakness. Negative for headaches.      Premorbid function:  Needing assistance with ADLs and transfers; non-ambulatory    Current function:    Physical Therapy    Restrictions/Precautions: Fall Risk, General Precautions  Other position/activity restrictions: Up w/ assist, LUE IV, maintain heels off bed    Bed Mobility Training  Bed

## 2024-01-08 NOTE — DISCHARGE SUMMARY
[facility]  Office: [phone]  Madhu Sweeney DO, Edson Rob DO, Jhon Hinkle DO, Ulysses Tan DO, Faye Haywood MD, Doris Saeed MD, Yuliet Miranda MD, Angelika Smith MD,  Catarino Sainz MD, Gabriel Powers MD, Benny Anton MD,  Deepika Franz DO, Grace Coleman MD, Amando Ervin MD, Teja Sweeney DO, Jackie Pandey MD,  Narciso Kingston DO, Ora Danielson MD, Anastasiia Mejia MD, Dot Moran MD, Kory Kennedy MD,  Oswaldo Lombardi MD, [place] MD, Kendall Sweeney MD, Matt Quiroz MD, Pavel Stone MD, Elmer Agarwal MD, Manuel Hess DO, Vahe Cardoza DO, Janel Torre MD,  Cameron Marte MD, Shirley Waterhouse, CNP,  Rhiannon Askew CNP, Constantin Bernal, CNP,  Taty Genao, OSVALDO, Pavithra De La Rosa, CNP, Chantale Bray, CNP, Roselyn Charles CNP, Patricia Gibson, CNP, Katherine Maria, CNP, Leanne Villarreal, PA-C, Randi Curran PA-C, Julia Hair, CNP, Hoa Bonilla, CNS, Pilar Schreiber, CNP, Hilda Cody, CNP, Tracy Schwab, CNP         [facility]   IN-PATIENT SERVICE   [facility]    Discharge Summary     Patient ID: Ghazal Mcgowan  :  1990   MRN: [id number]     ACCOUNT:  [id number]   Patient's PCP: Shanika Boss APRN - CNP  Admit Date: 2024   Discharge Date: 2024    Length of Stay: 0  Code Status:  Full Code  Admitting Physician: Jhon Hinkle DO  Discharge Physician: SAMARIA Mckoy CNP     Active Discharge Diagnoses:     Hospital Problem Lists:  Principal Problem:    Exacerbation of multiple sclerosis (HCC)  Active Problems:    Acquired central hypothyroidism    Primary chronic progressive multiple sclerosis (HCC)    Protein-calorie malnutrition (HCC)    Smoker    Generalized weakness    Severe malnutrition (HCC)  Resolved Problems:    MS (multiple sclerosis) (HCC)    Admission Condition:  fair     Discharged Condition: stable    Hospital Stay:     Hospital Course:  Ghazal Mcgowan is a 33 y.o. female who was admitted

## 2024-01-08 NOTE — CONSULTS
Full consult note to follow.    The patient is at a low level of function at baseline.  During admission, she has only been able to participate in bed mobility and required maxA with PT.  OT evaluation pending.  She does not meet criteria for acute inpatient rehabilitation at this time, as I do not think that she will be able to tolerate 3 hours of therapy per day.  Additionally, she will need longer-term rehabilitation than can be provided at acute rehab.  Would recommend home with home health care if she continues to have family support; admission to acute rehab may be possible in the future if she is able to participate more in therapies.  If she does not have ongoing home support, would recommended subacute rehabilitation at a skilled nursing facility.      Di Navas MD

## 2024-01-09 NOTE — PROGRESS NOTES
[facility] Neurology   IN-PATIENT SERVICE      NEUROLOGY PROGRESS  NOTE                Interval History:     No current complaints, feeling overall improved.    History of Present Illness:     HPI obtained from initial neurology consult from my partner on 1/6:    33-year-old female with past medical history of primary progressive MS with baseline significant deficits, history of hypothyroidism, who presents for generalized weakness.  History obtained from patient and chart review.  Patient is a poor historian.     She has been established with our neurology group over the last few years.  Most recently, she saw Dr. Salmeron on 9/27/2023.     She was diagnosed with MS in her early 20s.  At the time, she started having progressive right-sided weakness.  Subsequently, had difficulty walking.  Ultimately diagnosed with MS in [place].  She has been previously maintained on Copaxone and fingolimod.  Most recently, has been on Ocrevus, last infusion in ?April of this year per patient.  At her last visit with Dr. Salmeron, she was recommended to restart ocrelizumab.  Unfortunately, she has been unable to restart due to initial insurance issues but subsequently appears office was trying to reach patient and mom to schedule infusions but did not get a hold of them.     Of note, she also presented to the hospital in August 2023.  At the time, repeat MRI brain and cervical spine stable with no acute demyelination.  She was recommended to continue Ocrevus outpatient along with aggressive PT/OT.     Patient states since her last visit, her MS symptoms have remained relatively stable.  However, she has continued to have generalized progressive weakness, remains dependent on ADLs.  She presented to the hospital as her mom who is her primary caregiver left town for her birthday and patient was unable to take care of herself at home.  At baseline, patient uses a wheelchair, otherwise primarily stays in bed, she needs assistance with 
  [facility] Neurology   IN-PATIENT SERVICE      NEUROLOGY PROGRESS  NOTE            Date:   1/7/2024  Patient name:  Ghazal Mcgowan  Date of admission:  1/6/2024  YOB: 1990      Interval History:     No acute events. Patient with no new complaints or neurological symptoms. MRIs completed.    History of Present Illness:     33-year-old female with past medical history of primary progressive MS with baseline significant deficits, history of hypothyroidism, who presents for generalized weakness.  History obtained from patient and chart review.  Patient is a poor historian.     She has been established with our neurology group over the last few years.  Most recently, she saw Dr. Salmeron on 9/27/2023.     She was diagnosed with MS in her early 20s.  At the time, she started having progressive right-sided weakness.  Subsequently, had difficulty walking.  Ultimately diagnosed with MS in [place].  She has been previously maintained on Copaxone and fingolimod.  Most recently, has been on Ocrevus, last infusion in ?April of this year per patient.  At her last visit with Dr. Salmeron, she was recommended to restart ocrelizumab.  Unfortunately, she has been unable to restart due to initial insurance issues but subsequently appears office was trying to reach patient and mom to schedule infusions but did not get a hold of them.     Of note, she also presented to the hospital in August 2023.  At the time, repeat MRI brain and cervical spine stable with no acute demyelination.  She was recommended to continue Ocrevus outpatient along with aggressive PT/OT.     Patient states since her last visit, her MS symptoms have remained relatively stable.  However, she has continued to have generalized progressive weakness, remains dependent on ADLs.  She presented to the hospital as her mom who is her primary caregiver left town for her birthday and patient was unable to take care of herself at home.  At baseline, patient uses a 
[facility]  Office: [phone]  Madhu Sweeney DO, Edson Rob DO, Jhon Hinkle DO, Ulysses Tan DO, Faye Haywood MD, Doris Saeed MD, Yuliet Miranda MD, Angelika Smith MD,  Catarino Sainz MD, Gabriel Powers MD, Benny Anton MD,  Deepika Franz DO, Grace Coleman MD, Amando Ervin MD, Teja Sweeney DO, Jackie Pandey MD,  Narciso Kingston DO, Ora Danielson MD, Anastasiia Mejia MD, Dot Moran MD, Kory Kennedy MD,  Oswaldo Lombardi MD, [place] MD, Kendall Sweeney MD, Matt Quiroz MD, Pavel Stone MD, Elmer Agarwal MD, Manuel Hess DO, Vahe Cardoza DO, Janel Torre MD,  Cameron Marte MD, Shirley Waterhouse, CNP,  Rhiannon Askew CNP, Constantin Bernal, CNP,  Taty Genao, OSVALDO, Pavithra De La Rosa, CNP, Chantale Bray, CNP, Roselyn Charles CNP, Patricia Gibson CNP, Katherine Maria, CNP, Leanne Villarreal, PA-C, Randi Curran PA-C, Julia Hair, CNP, Hoa Bonilla, CNS, Pilar Schreiber, CNP, Hilda Cody, CNP, Tracy Schwab, CNP         [facility]   IN-PATIENT SERVICE   [facility]    Progress Note    1/8/2024    11:01 AM    Name:   Ghazal Mcgowan  MRN:     [id number]     Acct:      [id number]   Room:   2012/2012-02  IP Day:  0  Admit Date:  1/6/2024  3:35 AM    PCP:   Shanika Boss APRN - CNP  Code Status:  Full Code    Subjective:     C/C:   Chief Complaint   Patient presents with    Back Pain    Leg Pain    Ankle Pain     Hx MS     Interval History Status: not changed.     Patient resting comfortably in bed. She reports that she has chronic pain. She would like to talk with a  about helping her find an apartment. She denies any fevers, chills, chest pain, shortness of breath, nausea, vomiting and diarrhea.     Brief History:     Per previous documentation:  \"This is a 33-year-old female with a history of MS that presents with worsening weakness and discomfort. She admitted for possible MS exacerbation\"     Review of Systems:     Review 
Occupational Therapy  Facility/Department: [facility] MED SURG  Occupational Therapy Initial Assessment    Name: Ghazal Mcgowan  : 1990  MRN: [id number]  Date of Service: 2024    RN Abi reports patient is medically stable for therapy treatment this date.  Chart reviewed prior to treatment and patient is agreeable for therapy.  All lines intact and patient positioned comfortably at end of treatment.  All patient needs addressed prior to ending therapy session.       Pt currently functioning below baseline.  Recommend daily inpatient skilled therapy at time of discharge to maximize long term outcomes and prevent re-admission. Please refer to AM-PAC score for current level of function.     Discharge Recommendations:  Patient would benefit from continued therapy after discharge  OT Equipment Recommendations  Equipment Needed: Yes  Mobility Devices: ADL Assistive Devices  ADL Assistive Devices: Transfer Tub Bench       Per H&P: Ghazal Mcgowan is a 33 y.o. Non- / non  female who presents with Back Pain, Leg Pain, and Ankle Pain (Hx MS) and is admitted to the hospital for the management of Exacerbation of multiple sclerosis (HCC).     The patient presents to the ER reporting progressive weakness and generalized pain/discomfort. The patient tells me she does eat at least 2 meals a day.  She states she has not been able to ambulate for a while and wears a depends. She denies difficulty swallowing and she is still able to feed herself.  She states her mother usually provides her care but she went out of town for her birthday that is really the main reason she came to the ER because she did not have anyone to take care of her.  She denies nausea, vomiting, fever or chills.  The patient was seen by Dr. Essie Salmeron.  According to her note in September of this year the patients symptoms were ongoing without a clear diagnosis for about 1 year duration before her diagnosis.  She ultimately woke up one morning and was 
Patient admitted into room 2012 from emergency department per cart. Patient transferred into bed per staff. Vitals assessed as charted. Bed alarm activated for patient's safety.  
Patient discharged to home in privet vehicle with brother. Patient taken out in wheelchair by PCT. Discharge instructions given and explained to patient. All patient's belongings packed and taken with patient at time of discharge. Patient stable for discharge.  
Patient to MRI via bed at this time.   
Physical Therapy  Facility/Department: [facility] MED SURG  Daily Treatment Note  NAME: Ghazal Mcgowan  : 1990  MRN: [id number]    Date of Service: 2024    RN Abi reports patient is medically stable for therapy treatment this date.    Chart reviewed prior to treatment and patient is agreeable for therapy.  All lines intact and patient positioned comfortably at end of treatment.  All patient needs addressed prior to ending therapy session.       Discharge Recommendations:  Pt currently functioning below baseline.  Recommend daily inpatient skilled therapy at time of discharge to maximize long term outcomes and prevent re-admission. Please refer to AM-PAC score for current level of function.   PT Equipment Recommendations  Equipment Needed: No    Patient Diagnosis(es): The encounter diagnosis was MS (multiple sclerosis) ([facility]).    Assessment   Assessment: 34 y/o female referred to PT.  Therapist provided extensive education on pressure relief techniques to maintain good skin integrity and reduce risk for pressure sores. Patient utilizing B UE on bed rail to roll to the L and R.  Extensor tone present. Decreased frequency per week this date. Patient is dependent with transfers. Patient reporting her \"family picks her up\" to transfer but she does have a celio lift at home. Recommend home with 24/7 family assist. However, patient would benefit from SNF if family cannot provide 24/7 assist due to patient dependence with transfers/mobility.. Patient would benefit from continued PT.  Activity Tolerance: Patient tolerated evaluation without incident  Equipment Needed: No     Plan    Physical Therapy Plan  General Plan: 3-5 times per week  Current Treatment Recommendations: Strengthening;ROM;Functional mobility training;Home exercise program;Safety education & training     Restrictions  Restrictions/Precautions  Restrictions/Precautions: Modified Diet  Required Braces or Orthoses?: No     Subjective  
Physical Therapy  Facility/Department: [facility] MED SURG  Physical Therapy Initial Assessment    Name: Ghazal Mcgowan  : 1990  MRN: [id number]  Date of Service: 2024    Discharge Recommendations:  Continue to assess pending progress   PT Equipment Recommendations  Equipment Needed: Yes (consider bedside commode rather than just using going in a brief and needing cleaned up.)      Patient Diagnosis(es): The encounter diagnosis was MS (multiple sclerosis) (HCC).    HPI: copied from chart: Ghazal Mcgowan is a 33 y.o. Non- / non  female who presents with Back Pain, Leg Pain, and Ankle Pain (Hx MS)   and is admitted to the hospital for the management of Exacerbation of multiple sclerosis (HCC).     The patient presents to the ER reporting progressive weakness and generalized pain/discomfort. The patient tells me she does eat at least 2 meals a day.  She states she has not been able to ambulate for a while and wears a depends.  She states her mother usually provides her care but she went out of town for her birthday that is really the main reason she came to the ER because she did not have anyone to take care of her.  She denies nausea, vomiting, fever or chills.  The patient was seen by Dr. Essie Salmeron.  According to her note in September of this year the patients symptoms were ongoing without a clear diagnosis for about 1 year duration before her diagnosis.  She ultimately woke up one morning and was not able to walk.  At that point, she was admitted to [facility].  She underwent an extensive work-up and was ultimately diagnosed with PPMS.     When asked if she has followed up for her injections she says no one is ever scheduled any for her.  According to chart review there was a conversation about her receiving Ocrevus but looks like it might have been postponed because of insurance issues.      Past Medical History:  has a past medical history of Acquired central hypothyroidism, Arthritis, 
[facility]  Office: [phone]  Madhu Sweeney DO, Edson Rob DO, Jhon Hinkle DO, Ulysses Tan DO, Faye Haywood MD, Doris Saeed MD, Yuliet Miranda MD, Angelika Smith MD,  Catarino Sainz MD, Gabriel Powers MD, Benny Anton MD,  Deepika Franz DO, Grace Coleman MD, Amando Ervin MD, Teja Sweeney DO, Jackie Pandey MD,  Narciso Kingston DO, Ora Danielson MD, Anastasiia Mejia MD, Dot Moran MD, Kory Kennedy MD,  Oswaldo Lombardi MD, [place] MD, Kendall Sweeney MD, Matt Quiroz MD, Pavel Stone MD, Elmer Agarwal MD, Manuel Hess DO, Vahe Cardoza DO, Janel Torre MD,  Cameron Marte MD, Shirley Waterhouse, CNP,  Rhiannon Askew CNP, Constantin Bernal, CNP,  Taty Genao, OSVALDO, Pavithra De La Rosa, CNP, Chantale Bray, CNP, Roselyn Charles CNP, Patricia Gibson CNP, Katherine Maria CNP, Leanne Villarreal, PA-C, Randi Curran PA-C, Julia Hair, CNP, Hoa Bonilla, CNS, Pilar Schreiber, CNP, Hilda Cody CNP, Tracy Schwab, CNP         [facility]   IN-PATIENT SERVICE   [facility]    Progress Note    1/7/2024    7:58 AM    Name:   Ghazal Mcgowan  MRN:     [id number]     Acct:      [id number]   Room:   2012/2012-02  IP Day:  0  Admit Date:  1/6/2024  3:35 AM    PCP:   Shanika Boss APRN - CNP  Code Status:  Full Code    Subjective:     C/C:   Chief Complaint   Patient presents with    Back Pain    Leg Pain    Ankle Pain     Hx MS     Interval History Status: improved.     Patient being seen comfortably denies any complaints of chest pain, shortness of breath, nausea or vomiting, fevers or chills or acute complaints.    Brief History:     This is a 33-year-old female with a history of MS that presents with worsening weakness and discomfort.  She admitted for possible MS exacerbation neurology evaluation.    Review of Systems:     Constitutional:  negative for chills, fevers, sweats  Respiratory:  negative for cough, dyspnea on exertion, shortness of 
Transitions of Care Pharmacy Service   Medication Review    The patient's list of current home medications has been reviewed and updated.     Source(s) of information: Patient    Please note:   She has not filled maintenance meds since September and states she ran out but she should be taking baclofen, lexapro, gabapentin, and tizanidine. She thinks she should be on levothyroxine and potassium, but her TSH and potassium levels were normal here upon admission. She was on Duoneb previously but needs a new nebulizer and reports she is short of breath often. She also reports frequent nausea and states she would benefit from a Zofran prescription.  Please feel free to call with any questions about this encounter. Thank you.    Cici Hall [facility]  Transitions of Care Pharmacy Service  Phone:  [phone]  Fax: [phone]        Prior to Admission medications    Medication Sig Start Date End Date Taking? Authorizing Provider   senna (SENOKOT) 8.6 MG tablet Take 1 tablet by mouth daily as needed for Constipation   Yes ProviderJovanni MD   Baclofen (LIORESAL) 5 MG tablet Take 1 tablet by mouth 2 times daily 9/1/23   ProviderJovanni MD   escitalopram (LEXAPRO) 5 MG tablet Take 1 tablet by mouth daily 9/27/23   Essie Salmeron DO   tiZANidine (ZANAFLEX) 2 MG tablet Take 1 tablet by mouth at bedtime 9/27/23   Essie Salmeron DO   gabapentin (NEURONTIN) 100 MG capsule TAKE ONE CAP TID 9/24/20 9/27/23  Kash Grimes MD                
Unable to assess  ADULT DIET; Regular  ADULT ORAL NUTRITION SUPPLEMENT; Breakfast, Dinner; Standard High Calorie/High Protein Oral Supplement    Anthropometric Measures:  Height: 177.8 cm (5' 10\")  Ideal Body Weight (IBW): 150 lbs (68 kg)       Current Body Weight: 43.2 kg (95 lb 3.8 oz), 63.5 % IBW. Weight Source: Bed Scale  Current BMI (kg/m2): 13.7  Usual Body Weight: 52.2 kg (115 lb) (8/16/23)  % Weight Change (Calculated): -17.2  Weight Adjustment For: Paraplegia  Total Adjusted Percentage (Calculated): 7.5  Adjusted Ideal Body Weight (lbs) (Calculated): 138.8 lbs  Adjusted Ideal Body Weight (kg) (Calculated): 63.09 kg  Adjusted % Ideal Body Weight (Calculated): 68.6  Adjusted BMI (kg/m2) (Calculated): 14.7  BMI Categories: Underweight (BMI less than 22) age over 65    Estimated Daily Nutrient Needs:  Energy Requirements Based On: Kcal/kg  Weight Used for Energy Requirements: Current  Energy (kcal/day): 6686-5065 kcal (33-35 kcal/kg)  Weight Used for Protein Requirements: Current  Protein (g/day): 52-60 gm of protein (1.2-1.4 gm/kg)  Method Used for Fluid Requirements: 1 ml/kcal  Fluid (ml/day): 2440-4541 mL    Nutrition Diagnosis:   Severe malnutrition related to inadequate protein-energy intake as evidenced by weight loss, weight loss greater than or equal to 10% in 6 months, intake 26-50%, intake 51-75%    Nutrition Interventions:   Food and/or Nutrient Delivery: Continue Current Diet, Start Oral Nutrition Supplement  Nutrition Education/Counseling: Education completed  Coordination of Nutrition Care: Continue to monitor while inpatient           Goals:     Goals: PO intake 75% or greater       Nutrition Monitoring and Evaluation:      Food/Nutrient Intake Outcomes: Food and Nutrient Intake, Supplement Intake  Physical Signs/Symptoms Outcomes: Biochemical Data, Fluid Status or Edema, Skin, Weight    Discharge Planning:    Continue Oral Nutrition Supplement, Continue current diet           Roselyn Goode

## 2024-01-26 ENCOUNTER — TELEPHONE (OUTPATIENT)
Dept: NEUROLOGY | Age: 34
End: 2024-01-26

## 2024-01-26 NOTE — TELEPHONE ENCOUNTER
01 26 2024 I called the patient times 2 (01 08 2024 and 01 15 2024 at  [phone]) to schedule a hospital follow up appointment with LONNIE Arnold both times, no response.  I mailed the patient a letter asking them to call the office back to schedule this appointment.  KS

## 2024-02-12 ENCOUNTER — TELEPHONE (OUTPATIENT)
Dept: NEUROLOGY | Age: 34
End: 2024-02-12

## 2024-02-12 NOTE — TELEPHONE ENCOUNTER
I received a message from Jung CRISTOBAL with [facility].  She stated that Ghazal's insurance won't cover Ocrevus inf. at the hospital any longer. We have been unable to reach Ghazal.

## 2024-03-06 NOTE — TELEPHONE ENCOUNTER
I left a voice mail on [phone] asking for a return call. She needs a follow up appointment with Dr. Salmeron. She was hospitalized in Jan. 2024. I called [phone] and lm on that also.

## 2024-08-09 ENCOUNTER — HOSPITAL ENCOUNTER (OUTPATIENT)
Age: 34
Setting detail: OBSERVATION
Discharge: NURSING, REHAB OR LONG-TERM CARE | End: 2024-08-15
Attending: EMERGENCY MEDICINE | Admitting: INTERNAL MEDICINE
Payer: COMMERCIAL

## 2024-08-09 DIAGNOSIS — Z78.9 UNABLE TO CARE FOR SELF: Primary | ICD-10-CM

## 2024-08-09 DIAGNOSIS — G35 MULTIPLE SCLEROSIS (HCC): ICD-10-CM

## 2024-08-09 LAB
ANION GAP SERPL CALCULATED.3IONS-SCNC: 10 MMOL/L (ref 9–17)
BUN SERPL-MCNC: 11 MG/DL (ref 6–20)
BUN/CREAT SERPL: 22 (ref 9–20)
CALCIUM SERPL-MCNC: 9.3 MG/DL (ref 8.6–10.4)
CHLORIDE SERPL-SCNC: 106 MMOL/L (ref 98–107)
CO2 SERPL-SCNC: 20 MMOL/L (ref 20–31)
CREAT SERPL-MCNC: 0.5 MG/DL (ref 0.5–0.9)
GFR, ESTIMATED: >90 ML/MIN/1.73M2
GLUCOSE SERPL-MCNC: 86 MG/DL (ref 70–99)
POTASSIUM SERPL-SCNC: 4.4 MMOL/L (ref 3.7–5.3)
SODIUM SERPL-SCNC: 136 MMOL/L (ref 135–144)

## 2024-08-09 PROCEDURE — 97167 OT EVAL HIGH COMPLEX 60 MIN: CPT

## 2024-08-09 PROCEDURE — 36415 COLL VENOUS BLD VENIPUNCTURE: CPT

## 2024-08-09 PROCEDURE — 99285 EMERGENCY DEPT VISIT HI MDM: CPT

## 2024-08-09 PROCEDURE — 97163 PT EVAL HIGH COMPLEX 45 MIN: CPT

## 2024-08-09 PROCEDURE — 97530 THERAPEUTIC ACTIVITIES: CPT

## 2024-08-09 PROCEDURE — G0378 HOSPITAL OBSERVATION PER HR: HCPCS

## 2024-08-09 PROCEDURE — 97110 THERAPEUTIC EXERCISES: CPT

## 2024-08-09 PROCEDURE — 80048 BASIC METABOLIC PNL TOTAL CA: CPT

## 2024-08-09 PROCEDURE — 97535 SELF CARE MNGMENT TRAINING: CPT

## 2024-08-09 PROCEDURE — 99221 1ST HOSP IP/OBS SF/LOW 40: CPT | Performed by: INTERNAL MEDICINE

## 2024-08-09 PROCEDURE — 2580000003 HC RX 258: Performed by: INTERNAL MEDICINE

## 2024-08-09 RX ORDER — SODIUM CHLORIDE 0.9 % (FLUSH) 0.9 %
5-40 SYRINGE (ML) INJECTION EVERY 12 HOURS SCHEDULED
Status: DISCONTINUED | OUTPATIENT
Start: 2024-08-09 | End: 2024-08-15 | Stop reason: HOSPADM

## 2024-08-09 RX ORDER — SODIUM CHLORIDE 9 MG/ML
INJECTION, SOLUTION INTRAVENOUS PRN
Status: DISCONTINUED | OUTPATIENT
Start: 2024-08-09 | End: 2024-08-15 | Stop reason: HOSPADM

## 2024-08-09 RX ORDER — POTASSIUM CHLORIDE 7.45 MG/ML
10 INJECTION INTRAVENOUS PRN
Status: DISCONTINUED | OUTPATIENT
Start: 2024-08-09 | End: 2024-08-15 | Stop reason: HOSPADM

## 2024-08-09 RX ORDER — BACLOFEN 10 MG/1
5 TABLET ORAL 2 TIMES DAILY
Status: DISCONTINUED | OUTPATIENT
Start: 2024-08-09 | End: 2024-08-09

## 2024-08-09 RX ORDER — POLYETHYLENE GLYCOL 3350 17 G/17G
17 POWDER, FOR SOLUTION ORAL DAILY PRN
Status: DISCONTINUED | OUTPATIENT
Start: 2024-08-09 | End: 2024-08-15 | Stop reason: HOSPADM

## 2024-08-09 RX ORDER — SODIUM CHLORIDE 0.9 % (FLUSH) 0.9 %
10 SYRINGE (ML) INJECTION PRN
Status: DISCONTINUED | OUTPATIENT
Start: 2024-08-09 | End: 2024-08-15 | Stop reason: HOSPADM

## 2024-08-09 RX ORDER — ENOXAPARIN SODIUM 100 MG/ML
30 INJECTION SUBCUTANEOUS DAILY
Status: DISCONTINUED | OUTPATIENT
Start: 2024-08-09 | End: 2024-08-15 | Stop reason: HOSPADM

## 2024-08-09 RX ORDER — ACETAMINOPHEN 325 MG/1
650 TABLET ORAL EVERY 6 HOURS PRN
Status: DISCONTINUED | OUTPATIENT
Start: 2024-08-09 | End: 2024-08-15 | Stop reason: HOSPADM

## 2024-08-09 RX ORDER — ONDANSETRON 4 MG/1
4 TABLET, ORALLY DISINTEGRATING ORAL EVERY 8 HOURS PRN
Status: DISCONTINUED | OUTPATIENT
Start: 2024-08-09 | End: 2024-08-15 | Stop reason: HOSPADM

## 2024-08-09 RX ORDER — MAGNESIUM SULFATE 1 G/100ML
1000 INJECTION INTRAVENOUS PRN
Status: DISCONTINUED | OUTPATIENT
Start: 2024-08-09 | End: 2024-08-15 | Stop reason: HOSPADM

## 2024-08-09 RX ORDER — ONDANSETRON 2 MG/ML
4 INJECTION INTRAMUSCULAR; INTRAVENOUS EVERY 6 HOURS PRN
Status: DISCONTINUED | OUTPATIENT
Start: 2024-08-09 | End: 2024-08-15 | Stop reason: HOSPADM

## 2024-08-09 RX ORDER — ESCITALOPRAM OXALATE 10 MG/1
5 TABLET ORAL DAILY
Status: DISCONTINUED | OUTPATIENT
Start: 2024-08-09 | End: 2024-08-09

## 2024-08-09 RX ORDER — ACETAMINOPHEN 650 MG/1
650 SUPPOSITORY RECTAL EVERY 6 HOURS PRN
Status: DISCONTINUED | OUTPATIENT
Start: 2024-08-09 | End: 2024-08-15 | Stop reason: HOSPADM

## 2024-08-09 RX ORDER — TIZANIDINE 2 MG/1
2 TABLET ORAL NIGHTLY
Status: DISCONTINUED | OUTPATIENT
Start: 2024-08-09 | End: 2024-08-09

## 2024-08-09 RX ORDER — POTASSIUM CHLORIDE 20 MEQ/1
40 TABLET, EXTENDED RELEASE ORAL PRN
Status: DISCONTINUED | OUTPATIENT
Start: 2024-08-09 | End: 2024-08-15 | Stop reason: HOSPADM

## 2024-08-09 RX ORDER — SENNOSIDES A AND B 8.6 MG/1
1 TABLET, FILM COATED ORAL DAILY PRN
Status: DISCONTINUED | OUTPATIENT
Start: 2024-08-09 | End: 2024-08-15 | Stop reason: HOSPADM

## 2024-08-09 RX ADMIN — SODIUM CHLORIDE, PRESERVATIVE FREE 10 ML: 5 INJECTION INTRAVENOUS at 22:17

## 2024-08-09 ASSESSMENT — PAIN - FUNCTIONAL ASSESSMENT: PAIN_FUNCTIONAL_ASSESSMENT: NONE - DENIES PAIN

## 2024-08-09 NOTE — ED PROVIDER NOTES
ADDENDUM:      Care of this patient was assumed from Dr. Johnson.  The patient was seen for social reasons that she has no one to care for her at home.  The patient's initial evaluation and plan have been discussed with the prior provider who initially evaluated the patient.  Nursing Notes, Past Medical Hx, Past Surgical Hx, Social Hx, Allergies, and Family Hx were all reviewed.    RECENT VITALS:  BP: 103/67, Temp: 98.9 °F (37.2 °C), Pulse: 63, Respirations: 16     Medical Decision Making      34-year-old female with a history of MS requiring round-the-clock care at home.  She has no acute medical complaints at this time.  Awaiting social work assistance for possible placement.    Disposition   CLINICAL IMPRESSION:  1. Unable to care for self    2. Multiple sclerosis (HCC)      DISPOSITION:   Admit to med/surg floor     (Please note that portions of this note were completed with a voice recognition program.  Efforts were made to edit the dictations but occasionally words are mis-transcribed.)          Yobani Yost MD  08/09/24 [id number]

## 2024-08-09 NOTE — PROGRESS NOTES
Pt arrived to room  2026 via cart, total assist with transfer, patient is non-ambulatory, denies pain/discomfort, IV placed and incontinent care provided, vitals obtained, oriented to room, data base and admission assessment initiated

## 2024-08-09 NOTE — PROGRESS NOTES
.[x] Medication Reconciliation was completed and the patient's home medication list was verified. The Med List Status is \"Complete\". The following sources were used to assist with Medication Reconciliation:    [] Med Rec Pharmacist already completed  [] Patient had a list of medications which was transcribed into the EHR.  [] Patient provided bottles of their medications  [x] Home medications reviewed and confirmed with patient   [] Contacted patient's pharmacy to confirm home medications  [] Contacted patient's physician office to confirm home medications  [] Medical Records from another facility and/or Care Everywhere were reviewed  [] MAR from facility requested to be faxed over  [] Unable to complete due to patient condition  [] Unable to validate med reconciliation      [] There are one or more home medications that need clarification before Medication Reconciliation can be completed. The Med List Status has been marked as In Progress.     To assist with Home Medication Reconciliation the following actions have been taken:    [x] Pharmacy medication reconciliation service requested. (Note: This can be done by sending a Perfect Serve message to The Med Rec Pharmacist or by phoning [phone].)  [] Family requested to bring medications into the hospital  [] Family requested to call hospital with medication list  [] Message left with physician office  [] Request for medical records made to n/a  [x] Other pt states she has medications that were prescribed for her, but she does not take any medications

## 2024-08-09 NOTE — CARE COORDINATION
Social jennie: spoke to pt who was agreeable to snf however pt was asked to leave [facility] for smoking Marijuana in the snf  and would not stop. So she is not going to get readmitted at any of those 3 facilities. One was in network with what appears to be her primary insurance [employer]. Will continue to try all snf locations that could take the pt. Notified ED RN of same.Taty olvera

## 2024-08-09 NOTE — H&P
[facility]  Office: [phone]  Madhu Sweeney DO, Edson Rob DO, Jhon Hinkle DO, Ulysses Tan DO, Faye Haywood MD, Doris Saeed MD, Yuliet Miranda MD, Angelika Smith MD,  Catarino Sainz MD, Gabriel Powers MD, Benny Anton MD,  Deepika Franz DO, Grace Coleman MD, Amando Ervin MD, Teja Sweeney DO, Jackie Pandey MD,  Narciso Kingston DO, Ora Danielson MD, Anastasiia Mejia MD, Dot Moran MD, Kory Kennedy MD,  Oswaldo Lombardi MD, [place] MD, Kendall Sweeney MD, Matt Quiroz MD, Pavel Stone MD, Elmer Agarwal MD, Manuel Hess DO, Vahe Cardoza DO, Janel Torre MD,  Cameron Marte MD, Shirley Waterhouse, CNP,  Rhiannon Askew CNP, Constantin Bernal, CNP,  Taty Genao, OSVALDO, Pavithra De La Rosa, CNP, Chantale Bray, CNP, Patricia Gibson CNP, Katherine Maria, CNP, Leanne Villarreal, PA-C, aRndi Curran PA-C, Zoe Jones, CNP, Marika Smith, CNP, Julia Hair, CNP, Roselyn Charles, CNP, Hoa Bonilla, CNS, Pilar Schreiber, CNP, Hilda Cody CNP, Tracy Schwab, CNP         [facility]   IN-PATIENT SERVICE   [facility]    HISTORY AND PHYSICAL EXAMINATION            Date:   8/9/2024  Patient name:  Ghazal Mcgowan  Date of admission:  8/9/2024  5:53 AM  MRN:   [id number]  Account:  [id number]  YOB: 1990  PCP:    Shanika Boss APRN - CNP  Room:   2026/2026-01  Code Status:    Full Code    Chief Complaint:     Chief Complaint   Patient presents with   • Medical Problem       History Obtained From:     patient, electronic medical record    History of Present Illness:     Ghazal Mcgowan is a 34 y.o. Non- / non  female who presents with Medical Problem   and is admitted to the hospital for the management of Unable to care for self.    This 34 yof with progressive MS presents unable to care for herself.  She is wheelchair and bed bound and her mother is primary caregiver, but she went out of town.  Patient's brother was

## 2024-08-09 NOTE — PROGRESS NOTES
Occupational Therapy  Facility/Department: [facility]  Occupational Therapy Initial Assessment    Name: Ghazal Mcgowan  : 1990  MRN: [id number]  Date of Service: 2024    Discharge Recommendations:  Patient would benefit from continued therapy after discharge          Patient Diagnosis(es): The encounter diagnosis was Unable to care for self.  Past Medical History:  has a past medical history of Acquired central hypothyroidism, Arthritis, Asthma, Encounter for medication monitoring, Exacerbation of multiple sclerosis (HCC), Family history of breast cancer, OUMOU virus antibody positive, Lower paraplegia (HCC), Marijuana smoker, continuous, MS (multiple sclerosis) (HCC), Neuromuscular disorder (HCC), Primary chronic progressive multiple sclerosis (HCC), and Rash in adult.  Past Surgical History:  has no past surgical history on file.     HPI  34-year-old female with a history of multiple sclerosis, asthma, lower paraplegia presents to the ED for inability to care for self at home.  Patient states that she typically lives with her mother, adult brother, nephew.  Patient states that her mother left for vacation yesterday, there were no arrangements made for patient's care and her mother is her primary caregiver.  Patient feels that her brother is not able to take care of her, she is unable to take care of herself so this morning she called EMS to be brought to the hospital.  Here in the ER she complains of some chronic back and hip pain, she has no other acute complaints.      Assessment   Performance deficits / Impairments: Decreased ADL status;Decreased strength;Decreased ROM;Decreased endurance;Decreased cognition;Decreased posture;Decreased sensation;Decreased balance;Decreased coordination;Decreased safe awareness  Assessment: pt appears to be at baseline in terms of functional performance with ADLs and abilty to move self in bed. Pt would benefit from further OT to address UE strenght deficits, sitting  balance/tolerance, and to assure pt maintains ability to perform simple grooming and self feeding on her own.  Prognosis: Fair  Decision Making: High Complexity  REQUIRES OT FOLLOW-UP: Yes  Activity Tolerance  Activity Tolerance: Patient limited by fatigue;Treatment limited secondary to decreased cognition        Plan   Occupational Therapy Plan  Times Per Week: 3-4x/week  Current Treatment Recommendations: Strengthening, ROM, Balance training, Safety education & training, Patient/Caregiver education & training, Self-Care / ADL, Positioning     Restrictions  Restrictions/Precautions  Restrictions/Precautions: Fall Risk, General Precautions  Position Activity Restriction  Other position/activity restrictions: BMI 15.78- many bony prominences    Subjective   General  Chart Reviewed: Yes  Patient assessed for rehabilitation services?: Yes  Additional Pertinent Hx: MS, bedbound at home  Family / Caregiver Present: No  Subjective  Subjective: pt states she is \"sore all over\"  General Comment  Comments: RN reports patient is medically stable for therapy treatment this date.    Chart reviewed prior to treatment and patient is agreeable for therapy.  All lines intact and patient positioned comfortably at end of treatment.  All patient needs addressed prior to ending therapy session.     Social/Functional History  Social/Functional History  Lives With: Parent (mother, brother, 5 year old nephew)  Type of Home: House  Home Layout: Two level, Able to Live on Main level with bedroom/bathroom  Home Access: Stairs to enter with rails  Entrance Stairs - Number of Steps: 5- pt's family carries pt in  Bathroom Shower/Tub:  (pt does not leave bed to use bathroom for any toileting or bathing)  Home Equipment: Hospital bed, Wheelchair - Manual, Wheelchair - Electric (Has not been up to w/c in a year, in bed all the time.)  Receives Help From: Family (mother assists with self care all from bed level.)  ADL Assistance: Needs

## 2024-08-09 NOTE — ED PROVIDER NOTES
EMERGENCY DEPARTMENT ENCOUNTER    Pt Name: Ghazal Mcgowan  MRN: [id number]  Birthdate 1990  Date of evaluation: 8/9/24  CHIEF COMPLAINT       Chief Complaint   Patient presents with    pt has no one to care for her at home while her mom is away      EMS arrival. Original call came in for concerns of sob. On arrival pt has no respiratory distress. Pt states she is actually here because her mother left her at home without any home care. Pt has MS and needs 24 hour care. Mother did not have anything in place. Mother left a few hours ago and will be gone for the next week.     HISTORY OF PRESENT ILLNESS   HPI  34-year-old female with a history of multiple sclerosis, asthma, lower paraplegia presents to the ED for inability to care for self at home.  Patient states that she typically lives with her mother, adult brother, nephew.  Patient states that her mother left for vacation yesterday, there were no arrangements made for patient's care and her mother is her primary caregiver.  Patient feels that her brother is not able to take care of her, she is unable to take care of herself so this morning she called EMS to be brought to the hospital.  Here in the ER she complains of some chronic back and hip pain, she has no other acute complaints.    REVIEW OF SYSTEMS     Review of Systems   All other systems reviewed and are negative.    PASTMEDICAL HISTORY     Past Medical History:   Diagnosis Date    Acquired central hypothyroidism 11/7/2019    Arthritis     Asthma     Encounter for medication monitoring 11/14/2019    Exacerbation of multiple sclerosis (HCC) 2018    Family history of breast cancer 3/7/2020    OUMOU virus antibody positive 3/7/2020    Lower paraplegia (HCC) 11/14/2019    Marijuana smoker, continuous 11/5/2019    MS (multiple sclerosis) (HCC)     Neuromuscular disorder (HCC)     Primary chronic progressive multiple sclerosis (HCC) 11/14/2019    Rash in adult 11/5/2019     SURGICAL HISTORY     No past surgical      DISPOSITION/PLAN   DISPOSITION    Pending    PATIENT REFERRED TO:  No follow-up provider specified.  DISCHARGE MEDICATIONS:  New Prescriptions    No medications on file     Marc Johnson MD  Attending Emergency Physician                    Marc Johnson MD  08/09/24 0653       Marc Johnson MD  08/09/24 0653

## 2024-08-09 NOTE — PROGRESS NOTES
Progress Note  Date:2024       Room:[place] [address]  Patient Name:Ghazal Mcgowan     YOB: 1990     Age:34 y.o.        Subjective    Subjective   Review of Systems  Objective         Vitals Last 24 Hours:  TEMPERATURE:  Temp  Av.9 °F (37.2 °C)  Min: 98.9 °F (37.2 °C)  Max: 98.9 °F (37.2 °C)  RESPIRATIONS RANGE: Resp  Av  Min: 20  Max: 20  PULSE OXIMETRY RANGE: SpO2  Av %  Min: 100 %  Max: 100 %  PULSE RANGE: Pulse  Av  Min: 66  Max: 66  BLOOD PRESSURE RANGE: Systolic (24hrs), Av , Min:104 , Max:104   ; Diastolic (24hrs), Av, Min:70, Max:70    I/O (24Hr):  No intake or output data in the 24 hours ending 24 1443  Objective  Labs/Imaging/Diagnostics    Labs:  CBC:No results for input(s): \"WBC\", \"RBC\", \"HGB\", \"HCT\", \"MCV\", \"RDW\", \"PLT\" in the last 72 hours.  CHEMISTRIES:No results for input(s): \"NA\", \"K\", \"CL\", \"CO2\", \"BUN\", \"CREATININE\", \"GLUCOSE\", \"PHOS\", \"MG\" in the last 72 hours.    Invalid input(s): \"CA\"  PT/INR:No results for input(s): \"PROTIME\", \"INR\" in the last 72 hours.  APTT:No results for input(s): \"APTT\" in the last 72 hours.  LIVER PROFILE:No results for input(s): \"AST\", \"ALT\", \"BILIDIR\", \"BILITOT\", \"ALKPHOS\" in the last 72 hours.    Imaging Last 24 Hours:  No results found.  Assessment//Plan           Assessment & Plan    Electronically signed by Althea Palmer PT on 24 at 2:43 PM EDT

## 2024-08-09 NOTE — PROGRESS NOTES
Physical Therapy  Facility/Department: [facility]  Physical Therapy Initial Assessment    Name: Ghazal Mcgowan  : 1990  MRN: [id number]  Date of Service: 2024    Discharge Recommendations:  Patient would benefit from continued therapy after discharge.       PMH: 34-year-old female with a history of multiple sclerosis, asthma, lower paraplegia presents to the ED for inability to care for self at home. Patient states that she typically lives with her mother, adult brother, nephew. Patient states that her mother left for vacation yesterday, there were no arrangements made for patient's care and her mother is her primary caregiver. Patient feels that her brother is not able to take care of her, she is unable to take care of herself so this morning she called EMS to be brought to the hospital. Here in the ER she complains of some chronic back and hip pain, she has no other acute complaints.       Patient Diagnosis(es): The encounter diagnosis was Unable to care for self.  Past Medical History:  has a past medical history of Acquired central hypothyroidism, Arthritis, Asthma, Encounter for medication monitoring, Exacerbation of multiple sclerosis (HCC), Family history of breast cancer, OUMOU virus antibody positive, Lower paraplegia (HCC), Marijuana smoker, continuous, MS (multiple sclerosis) (HCC), Neuromuscular disorder (HCC), Primary chronic progressive multiple sclerosis (HCC), and Rash in adult.  Past Surgical History:  has no past surgical history on file.    Assessment   Body Structures, Functions, Activity Limitations Requiring Skilled Therapeutic Intervention: Decreased ROM;Decreased strength;Decreased endurance;Decreased balance  Assessment: Patient appears close to baseline as she has not stood or transferred in over a year. Patient is very thin with a BMI of 15 and demo severe plantarflexion contractures.  Specific Instructions for Next Treatment: ROM, DF stretches, seated balance.  Therapy Prognosis:  Good  Decision Making: Medium Complexity  Requires PT Follow-Up: Yes  Activity Tolerance  Activity Tolerance: Patient limited by endurance     Plan   Physical Therapy Plan  General Plan: 2-3 times per week  Specific Instructions for Next Treatment: ROM, DF stretches, seated balance.  Current Treatment Recommendations: Strengthening, Balance training, Transfer training, Endurance training, Neuromuscular re-education, Safety education & training, Patient/Caregiver education & training, Therapeutic activities, Co-Treatment  Safety Devices  Type of Devices: All fall risk precautions in place, Call light within reach, Nurse notified, Left in bed     Restrictions  Restrictions/Precautions  Restrictions/Precautions: Fall Risk, General Precautions  Position Activity Restriction  Other position/activity restrictions: BMI 15.78- many bony prominences     Subjective   General  Chart Reviewed: Yes  Patient assessed for rehabilitation services?: Yes  Additional Pertinent Hx: MS  Response To Previous Treatment: Not applicable  Family / Caregiver Present: No  General Comment  Comments: RN reports patient is medically appropriate for PT.  Subjective  Subjective: Patient pleasant and agreeable to PT. Patient reports her brother is supposed to be caring for her while her mother is out of town, but he does not assist with dressing / bathing / hygiene.         Social/Functional History  Social/Functional History  Lives With: Parent (mother, brother, 5 year old nephew)  Type of Home: House  Home Layout: Two level, Able to Live on Main level with bedroom/bathroom  Home Access: Stairs to enter with rails  Entrance Stairs - Number of Steps: 5- pt's family carries pt in  Bathroom Shower/Tub:  (pt does not leave bed to use bathroom for any toileting or bathing)  Home Equipment: Hospital bed, Wheelchair - Manual, Wheelchair - Electric (Has not been up to w/c in a year, in bed all the time.)  Receives Help From: Family (mother assists with  Patient

## 2024-08-09 NOTE — ED TRIAGE NOTES
pt has no one to care for her at home while her mom is away (EMS arrival. Original call came in for concerns of sob. On arrival pt has no respiratory distress. Pt states she is actually here because her mother left her at home without any home care. Pt has MS and needs 24 hour care. Mother did not have anything in place. Mother left a few hours ago and will be gone for the next week.)

## 2024-08-09 NOTE — CARE COORDINATION
Social work: spoke to pt who states her mom is on vacation. Till next weds perhaps pt is unsure. Pt's brother \"the baby\" of the familty is supposed to care for pt but pt states he is not. She was frightered and called squad. Asked pt is she wanted to stay at snf till mom comes home she said yes and no special agency just \"not too far away\" . Pt lives near [facility]. Starting with [facility] and [facility]. Not sure who is in network with primary insurance.[employer] but snf may be able to take with medicaid possibly. Asked ER to order PT/OT to help with precert. Will need cristi and Rx at discharge. Taty olvera    Social work; pt could meet criteria for admission to ARU but they are not allowed to take on a \"presumptive\" approval as sn[facility] facilities are. [facility] is in network. So far it appears they want to try for precert rather than accept on Medicaid only as they do not pay much for PT/OT. May not be able to get precert until MOnday. Will continue to work with snf to see if they can do anything for now. Will need cristi and Rx if anything controlled is ordered at discharge. Taty olvera   COUGH

## 2024-08-10 PROCEDURE — 99231 SBSQ HOSP IP/OBS SF/LOW 25: CPT | Performed by: INTERNAL MEDICINE

## 2024-08-10 PROCEDURE — G0378 HOSPITAL OBSERVATION PER HR: HCPCS

## 2024-08-10 PROCEDURE — 2580000003 HC RX 258: Performed by: INTERNAL MEDICINE

## 2024-08-10 RX ADMIN — SODIUM CHLORIDE, PRESERVATIVE FREE 10 ML: 5 INJECTION INTRAVENOUS at 21:45

## 2024-08-10 RX ADMIN — SODIUM CHLORIDE, PRESERVATIVE FREE 10 ML: 5 INJECTION INTRAVENOUS at 07:48

## 2024-08-10 NOTE — PROGRESS NOTES
[facility]  Office: [phone]  Madhu Sweeney DO, Edson Rob DO, Jhon Hinkle DO, Ulysses Tan DO, Faye Haywood MD, Doris Saeed MD, Yuliet Miranda MD, Angelika Smith MD,  Catarino Sainz MD, Gabriel Powers MD, Benny Anton MD,  Deepika Franz DO, Grace Coleman MD, Amando Ervin MD, Teja Sweeney DO, Jackie Pandey MD,  Narciso Kingston DO, Ora Danielson MD, Anastasiia Mejia MD, Dot Moran MD, Kory Kennedy MD,  Oswaldo Lombardi MD, [place] MD, Kendall Sweeney MD, Matt Quiroz MD, Pavel Stone MD, Elmer Agarwal MD, Manuel Hess DO, Vahe Cardoza DO, Janel Torre MD,  Cameron Marte MD, Shirley Waterhouse, CNP,  Rhiannon Askew CNP, Constantin Bernal, CNP,  Taty Genao, OSVALDO, Pavithra De La Rosa, CNP, Chantale Bray, CNP, Patricia Gibson CNP, Katherine Maria CNP, Leanne Villarreal, PA-C, Randi Curran PA-C, Zoe Jones, CNP, Marika Smith, CNP, Julia Hair, CNP, Roselyn Charles, CNP, Hoa Bonilla, CNS, Pilar Schreiber, CNP, Hilda Cody CNP, Tracy Schwab, CNP         [facility]   IN-PATIENT SERVICE   [facility]    Progress Note    8/10/2024    8:49 AM    Name:   Ghazal Mcgowan  MRN:     [id number]     Acct:      [id number]   Room:   2026/2026-01  IP Day:  0  Admit Date:  8/9/2024  5:53 AM    PCP:   Shanika Boss APRN - CNP  Code Status:  Full Code    Subjective:     C/C:   Chief Complaint   Patient presents with    Medical Problem     Interval History Status: not changed.     Feels about the same  Just chronically weak    States she takes no meds at home    Brief History:     This 34 yof with progressive MS presents unable to care for herself. She is wheelchair and bed bound and her mother is primary caregiver, but she went out of town. Patient's brother was supposed to help take care of her but apparently is unable to do so. Therefore she presented to hospital for placement. She is not in exacerbation, she is at baseline.     Review of

## 2024-08-10 NOTE — CARE COORDINATION
Discharge planning    Call from BUD Guaman that patient aunt is requesting a referral to [facility].     Called Aunt (Pato  [phone]) regarding plan of care. She is requesting [facility] after speaking with her niece.    Explained that patient already has discharge order. That her sister ( patient mother) cannot use hospital as respite stay. Encourage pato to speak with her sister about having a CM assigned to her to assist with respite stays.     Aunbhavna will try and get a hold of her sister today.. she is also trying to make contact with patient's brother who was to take care of her.     Called social work and notified that writer put in referral to [facility].

## 2024-08-10 NOTE — DISCHARGE SUMMARY
[facility]  Office: [phone]  Madhu Sweeney DO, Edson Rob DO, Jhon Hinkle DO, Ulysses Tan DO, Faye Haywood MD, Doris Saeed MD, Yuliet Miranda MD, Angelika Smith MD,  Catarino Sainz MD, Gabriel Powers MD, Benny Anton MD,  Deepika Franz DO, Grace Coleman MD, Amando Ervin MD, Teja Sweeney DO, Jackie Pandey MD,  Narciso Kingston DO, Ora Danielson MD, Anastasiia Mejia MD, Dot Moran MD, Kory Kennedy MD,  Oswaldo Lombardi MD, [place] MD, Kendall Sweeney MD, Matt Quiroz MD, Pavel Stone MD, Elmer Agarwal MD, Manuel Hess DO, Vahe Cardoza DO, Janel Torre MD,  Cameron Marte MD, Shirley Waterhouse, CNP,  Rhiannon Askew CNP, Constantin Bernal, CNP,  Taty Genao, OSVALDO, Pavithra De La Rosa, CNP, Chantale Bray, CNP, Patricia Gibson CNP, Katherine Maria CNP, Leanne Villarreal, PA-C, Randi Curran PA-C, Zoe Jones, CNP, Marika Smith, CNP, Julia Hair, CNP, Roselyn Charles, CNP, Hoa Bonilla, CNS, Pilar Schreiber, CNP, Hilda Cody CNP, Tracy Schwab, CNP         [facility]   IN-PATIENT SERVICE   [facility]    Discharge Summary     Patient ID: Ghazal Mcgowan  :  1990   MRN: [id number]     ACCOUNT:  [id number]   Patient's PCP: Shanika Boss APRN - CNP  Admit Date: 2024   Discharge Date: 8/10/2024 ***    Length of Stay: 0  Code Status:  Full Code  Admitting Physician: Ulysses Tan DO  Discharge Physician: Ulysses Tan DO     Active Discharge Diagnoses:     Hospital Problem Lists:  Principal Problem:    Unable to care for self  Active Problems:    Marijuana smoker, continuous    Acquired central hypothyroidism    Primary chronic progressive multiple sclerosis (HCC)    Lower paraplegia (HCC)    Generalized weakness    Severe malnutrition (HCC)  Resolved Problems:    * No resolved hospital problems. *      Admission Condition:  fair     Discharged Condition: fair    Hospital Stay:     Hospital Course:  Ghazal Mcgowan

## 2024-08-10 NOTE — PROGRESS NOTES
Transitions of Care Pharmacy Service   Medication Review    The patient's list of current home medications has been reviewed.     Source(s) of information: Patient; Surescripts    Based on information provided by the above source(s), no changes to the patient's home medication list were necessary.     Please review the ACTION REQUESTED section of this note below for any discrepancies on current hospital orders.      I changed or updated the following medications on the patient's home medication list:  Removed None     Added None     Adjusted   None   Other Notes None         PROVIDER ACTION REQUESTED  Medications that need to be addressed by a physician/nurse practitioner:    Medication Action Requested        Patient states she has not taken any medications as of this year d/t out of refills.  Her PCP is Dr. Salmeron at [facility], whom she has not for about a year since she stopped taking her meds.    I was unable to assess med as patient does not recall what meds she was previously taking.         Please feel free to call me with any questions about this encounter. Thank you.    Katheryn Tafoya RPH   Transitions of Care Pharmacy Service  Phone:  [phone]  Fax: [phone]      Electronically signed by Katheryn Tafoya RPH on 8/10/2024 at 10:28 AM

## 2024-08-10 NOTE — PLAN OF CARE
Pt slept throughout the night with no complaints, call light in reach, bed mechanics in place.   Problem: Discharge Planning  Goal: Discharge to home or other facility with appropriate resources  Outcome: Progressing  Flowsheets (Taken 8/9/2024 2023)  Discharge to home or other facility with appropriate resources:   Identify barriers to discharge with patient and caregiver   Arrange for needed discharge resources and transportation as appropriate   Identify discharge learning needs (meds, wound care, etc)   Refer to discharge planning if patient needs post-hospital services based on physician order or complex needs related to functional status, cognitive ability or social support system     Problem: Skin/Tissue Integrity  Goal: Absence of new skin breakdown  Description: 1.  Monitor for areas of redness and/or skin breakdown  2.  Assess vascular access sites hourly  3.  Every 4-6 hours minimum:  Change oxygen saturation probe site  4.  Every 4-6 hours:  If on nasal continuous positive airway pressure, respiratory therapy assess nares and determine need for appliance change or resting period.  Outcome: Progressing     Problem: Skin/Tissue Integrity - Adult  Goal: Skin integrity remains intact  Outcome: Progressing  Flowsheets (Taken 8/9/2024 2023)  Skin Integrity Remains Intact: Monitor for areas of redness and/or skin breakdown  Goal: Incisions, wounds, or drain sites healing without S/S of infection  Outcome: Progressing  Flowsheets (Taken 8/9/2024 2023)  Incisions, Wounds, or Drain Sites Healing Without Sign and Symptoms of Infection:   ADMISSION and DAILY: Assess and document risk factors for pressure ulcer development   TWICE DAILY: Assess and document skin integrity   Implement wound care per orders     Problem: Infection - Adult  Goal: Absence of infection at discharge  Outcome: Progressing  Flowsheets (Taken 8/9/2024 2023)  Absence of infection at discharge:   Assess and monitor for signs and symptoms of  infection   Monitor lab/diagnostic results   Administer medications as ordered  Goal: Absence of infection during hospitalization  Outcome: Progressing  Flowsheets (Taken 8/9/2024 2023)  Absence of infection during hospitalization:   Assess and monitor for signs and symptoms of infection   Monitor lab/diagnostic results   Administer medications as ordered  Goal: Absence of fever/infection during anticipated neutropenic period  Outcome: Progressing  Flowsheets (Taken 8/9/2024 2023)  Absence of fever/infection during anticipated neutropenic period: Monitor white blood cell count     Problem: Metabolic/Fluid and Electrolytes - Adult  Goal: Electrolytes maintained within normal limits  Outcome: Progressing  Flowsheets (Taken 8/9/2024 2023)  Electrolytes maintained within normal limits:   Monitor labs and assess patient for signs and symptoms of electrolyte imbalances   Administer electrolyte replacement as ordered     Problem: Safety - Adult  Goal: Free from fall injury  Outcome: Progressing

## 2024-08-10 NOTE — DISCHARGE INSTR - COC
Continuity of Care Form    Patient Name: Ghazal Mcgowan   :  1990  MRN:  [id number]    Admit date:  2024  Discharge date:  8/15/24    Code Status Order: Full Code   Advance Directives:   Advance Care Flowsheet Documentation             Admitting Physician:  Ulysses Tan DO  PCP: Shanika Boss, APRN - CNP    Discharging Nurse: Virginia LANE  Discharging Hospital Unit/Room#:   Discharging Unit Phone Number: [phone]    Emergency Contact:   Extended Emergency Contact Information  Primary Emergency Contact: Sonja Barrera   [facility]  Home Phone: [phone]  Work Phone: [phone]  Mobile Phone: [phone]  Relation: Parent    Past Surgical History:  No past surgical history on file.    Immunization History:   There is no immunization history for the selected administration types on file for this patient.    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    Family history of breast cancer Z80.3    Multiple sclerosis, primary chronic progressive (HCC) G35    Multiple sclerosis (HCC) G35    Protein-calorie malnutrition (HCC) E46    Smoker F17.200    Generalized weakness R53.1    Severe malnutrition (HCC) E43    Unable to care for self Z78.9       Isolation/Infection:   Isolation            No Isolation          Patient Infection Status       None to display                     Nurse Assessment:  Last Vital Signs: BP (!) 115/51   Pulse 72   Temp 98.1 °F (36.7 °C) (Oral)   Resp 17   Ht 1.778 m (5' 10\")   Wt 41.1 kg (90 lb 9.6 oz)   SpO2 100%   BMI 13.00 kg/m²     Last documented pain score (0-10 scale):    Last Weight:   Wt Readings  from Last 1 Encounters:   08/10/24 41.1 kg (90 lb 9.6 oz)     Mental Status:  oriented and alert    IV Access:  - None    Nursing Mobility/ADLs:  Walking   Dependent  Transfer  Dependent  Bathing  Dependent  Dressing  Dependent  Toileting  Dependent  Feeding  Independent  Med Admin  Independent  Med Delivery   whole    Wound Care Documentation and Therapy:  Wound 08/09/24 Hip Right circular area, no drng (Active)   Wound Etiology Pressure Stage 2 08/09/24 1739   Dressing Status Clean;Dry;Intact 08/10/24 0800   Wound Cleansed Soap and water 08/09/24 1739   Dressing/Treatment Foam 08/10/24 0800   Wound Length (cm) 0.7 cm 08/09/24 1739   Wound Width (cm) 1 cm 08/09/24 1739   Wound Depth (cm) 0.1 cm 08/09/24 1739   Wound Surface Area (cm^2) 0.7 cm^2 08/09/24 1739   Wound Volume (cm^3) 0.07 cm^3 08/09/24 1739   Wound Assessment Other (Comment) 08/10/24 0800   Drainage Amount None (dry) 08/10/24 0800   Odor None 08/10/24 0800   Shirley-wound Assessment Intact 08/09/24 1739   Margins Other (Comment) 08/10/24 0800   Number of days: 0        Elimination:  Continence:   Bowel: No  Bladder: No  Urinary Catheter: None   Colostomy/Ileostomy/Ileal Conduit: No       Date of Last BM: 8/15/24  No intake or output data in the 24 hours ending 08/10/24 0851  No intake/output data recorded.    Safety Concerns:     At Risk for Falls    Impairments/Disabilities:      Delayed verbal responses, physicial limitations s/t MS    Nutrition Therapy:  Current Nutrition Therapy:   - Oral Diet:  General    Routes of Feeding: Oral  Liquids: No Restrictions  Daily Fluid Restriction: no  Last Modified Barium Swallow with Video (Video Swallowing Test): not done    Treatments at the Time of Hospital Discharge:   Respiratory Treatments: n/a  Oxygen Therapy:  is not on home oxygen therapy.  Ventilator:    - No ventilator support    Rehab Therapies: Physical Therapy and Occupational Therapy  Weight Bearing Status/Restrictions: No weight bearing

## 2024-08-10 NOTE — CARE COORDINATION
Discharge planning    Chart reviewed. Patient has history of MS> she is bed and wheelchair bound. She is from home with mother and brother. Mother went out of town and brother unable to care for her.     Will need to find out length of time of her mother being out of town and discuss with social work. She is working on placement to facility that will accept her.. she cannot go back to any of the [facility] due to non compliance with smoking marijuana     PT/OT pending  [employer] primary. [place] secondary

## 2024-08-11 PROCEDURE — 99231 SBSQ HOSP IP/OBS SF/LOW 25: CPT | Performed by: INTERNAL MEDICINE

## 2024-08-11 PROCEDURE — 2580000003 HC RX 258: Performed by: INTERNAL MEDICINE

## 2024-08-11 PROCEDURE — G0378 HOSPITAL OBSERVATION PER HR: HCPCS

## 2024-08-11 RX ADMIN — SODIUM CHLORIDE, PRESERVATIVE FREE 10 ML: 5 INJECTION INTRAVENOUS at 21:11

## 2024-08-11 RX ADMIN — SODIUM CHLORIDE, PRESERVATIVE FREE 10 ML: 5 INJECTION INTRAVENOUS at 10:01

## 2024-08-11 NOTE — PROGRESS NOTES
[facility]  Office: [phone]  Madhu Sweeney DO, Edson Rob DO, Jhon Hinkle DO, Ulysses Tan DO, Faye Haywood MD, Doris Saeed MD, Yuliet Miranda MD, Angelika Smith MD,  Catarino Sainz MD, Gabriel Powers MD, Benny Anton MD,  Deepika Franz DO, Grace Coleman MD, Amando Ervin MD, Teja Sweeney DO, Jackie Pandey MD,  Narciso Kingston DO, Ora Danielson MD, Anastasiia Mejia MD, Dot Moran MD, Kory Kennedy MD,  Oswaldo Lombardi MD, [place] MD, Kendall Sweeney MD, Matt Quiroz MD, Pavel Stoen MD, Elmer Agarwal MD, Manuel Hess DO, Vahe Cardoza DO, Janel Torre MD,  Cameron Marte MD, Shirley Waterhouse, CNP,  Rhinanon Askew CNP, Constantin Bernal, CNP,  Taty Genao, OSVALDO, Pavithra De La Rosa, CNP, Chantale Bray, CNP, Patricia Gibson CNP, Katherine Maria CNP, Leanne Villarreal, PA-C, Randi Curran PA-C, Zoe Jones, CNP, Marika Smith, CNP, Julia Hair, CNP, Roselyn Charles, CNP, Hoa Bonilla, CNS, Pilar Schreiber, CNP, Hilda Cody CNP, Tracy Schwab, CNP         [facility]   IN-PATIENT SERVICE   [facility]    Progress Note    8/11/2024    10:50 AM    Name:   Ghazal Mcgowan  MRN:     [id number]     Acct:      [id number]   Room:   2026/2026-01  IP Day:  0  Admit Date:  8/9/2024  5:53 AM    PCP:   Shanika Boss APRN - CNP  Code Status:  Full Code    Subjective:     C/C:   Chief Complaint   Patient presents with    Medical Problem     Interval History Status: not changed.     Feels about the same  Just chronically weak    States she takes no meds at home    Brief History:     This 34 yof with progressive MS presents unable to care for herself. She is wheelchair and bed bound and her mother is primary caregiver, but she went out of town. Patient's brother was supposed to help take care of her but apparently is unable to do so. Therefore she presented to hospital for placement. She is not in exacerbation, she is at baseline.     Review of

## 2024-08-11 NOTE — PLAN OF CARE
Pt slept throughout night with no complaints, bed mechanics in place, call light in reach, side rails up x2.   Problem: Discharge Planning  Goal: Discharge to home or other facility with appropriate resources  Outcome: Progressing  Flowsheets (Taken 8/10/2024 1935)  Discharge to home or other facility with appropriate resources:   Identify barriers to discharge with patient and caregiver   Arrange for needed discharge resources and transportation as appropriate   Identify discharge learning needs (meds, wound care, etc)   Refer to discharge planning if patient needs post-hospital services based on physician order or complex needs related to functional status, cognitive ability or social support system     Problem: Skin/Tissue Integrity  Goal: Absence of new skin breakdown  Description: 1.  Monitor for areas of redness and/or skin breakdown  2.  Assess vascular access sites hourly  3.  Every 4-6 hours minimum:  Change oxygen saturation probe site  4.  Every 4-6 hours:  If on nasal continuous positive airway pressure, respiratory therapy assess nares and determine need for appliance change or resting period.  Outcome: Progressing     Problem: Skin/Tissue Integrity - Adult  Goal: Skin integrity remains intact  Outcome: Progressing  Goal: Incisions, wounds, or drain sites healing without S/S of infection  Outcome: Progressing     Problem: Infection - Adult  Goal: Absence of infection at discharge  Outcome: Progressing  Flowsheets (Taken 8/10/2024 1935)  Absence of infection at discharge:   Assess and monitor for signs and symptoms of infection   Monitor lab/diagnostic results   Administer medications as ordered  Goal: Absence of infection during hospitalization  Outcome: Progressing  Flowsheets (Taken 8/10/2024 1935)  Absence of infection during hospitalization:   Assess and monitor for signs and symptoms of infection   Monitor lab/diagnostic results  Goal: Absence of fever/infection during anticipated neutropenic  period  Outcome: Progressing  Flowsheets (Taken 8/10/2024 1935)  Absence of fever/infection during anticipated neutropenic period: Monitor white blood cell count     Problem: Metabolic/Fluid and Electrolytes - Adult  Goal: Electrolytes maintained within normal limits  Outcome: Progressing  Flowsheets (Taken 8/10/2024 1935)  Electrolytes maintained within normal limits:   Monitor labs and assess patient for signs and symptoms of electrolyte imbalances   Administer electrolyte replacement as ordered     Problem: Safety - Adult  Goal: Free from fall injury  Outcome: Progressing

## 2024-08-11 NOTE — PROGRESS NOTES
Spiritual Health Assessment/Progress Note  [facility]    (P) Initial Encounter, Spiritual/Emotional Needs,  ,  ,      Name: Ghazal Mcgowan MRN: [id number]    Age: 34 y.o.     Sex: female   Language: English   Alevism: Sabianism   Unable to care for self     Date: 8/11/2024            Total Time Calculated: (P) 15 min              Spiritual Assessment began in [facility]        Referral/Consult From: (P) Rounding   Encounter Overview/Reason: (P) Initial Encounter, Spiritual/Emotional Needs  Service Provided For: (P) Patient    Pt. Lives in [place] area and comes across as feeling both displaced and \"out of place.\"    Jigna, Belief, Meaning:   Patient is connected with a jigna tradition or spiritual practice  Family/Friends are connected with a jigna tradition or spiritual practice      Importance and Influence:  Patient has spiritual/personal beliefs that influence decisions regarding their health  Family/Friends no family/friends present    Community:  Patient is connected with a spiritual community  Family/Friends are connected with a spiritual community:    Assessment and Plan of Care:     Patient Interventions include: Provided sacramental/Congregation ritual  Family/Friends Interventions include: Other: Family/Friends not present    Patient Plan of Care: Spiritual Care available upon further referral  Family/Friends Plan of Care: Spiritual Care available upon further referral    Electronically signed by Chaplain Mer[facility] on 8/11/2024 at 1:04 PM         08/11/24 1259   Encounter Summary   Encounter Overview/Reason Initial Encounter;Spiritual/Emotional Needs   Service Provided For Patient   Referral/Consult From [facility]   Support System Family members   Last Encounter  08/11/24   Complexity of Encounter Low   Begin Time 1130   End Time  1145   Total Time Calculated 15 min   Spiritual/Emotional needs   Type Spiritual Support   Assessment/Intervention/Outcome   Assessment Calm;Coping;Hopeful;Peaceful    Intervention Active listening;Sustaining Presence/Ministry of presence;Prayer (assurance of)/[place]   Outcome Connection/Belonging;Expressed Gratitude;Engaged in conversation   Plan and Referrals   Plan/Referrals Continue Support (comment)

## 2024-08-11 NOTE — PLAN OF CARE
Problem: Discharge Planning  Goal: Discharge to home or other facility with appropriate resources  8/11/2024 1827 by Rowena Butler, RN  Outcome: Progressing  Flowsheets  Taken 8/11/2024 1827  Discharge to home or other facility with appropriate resources:   Identify barriers to discharge with patient and caregiver   Arrange for needed discharge resources and transportation as appropriate   Identify discharge learning needs (meds, wound care, etc)   Refer to discharge planning if patient needs post-hospital services based on physician order or complex needs related to functional status, cognitive ability or social support system  Taken 8/11/2024 0900  Discharge to home or other facility with appropriate resources: Identify barriers to discharge with patient and caregiver     Problem: Skin/Tissue Integrity  Goal: Absence of new skin breakdown  Description: 1.  Monitor for areas of redness and/or skin breakdown  2.  Assess vascular access sites hourly  3.  Every 4-6 hours minimum:  Change oxygen saturation probe site  4.  Every 4-6 hours:  If on nasal continuous positive airway pressure, respiratory therapy assess nares and determine need for appliance change or resting period.  8/11/2024 1827 by Rowena uBtler, RN  Outcome: Progressing     Problem: Skin/Tissue Integrity - Adult  Goal: Skin integrity remains intact  8/11/2024 1827 by Rowena Butler, RN  Outcome: Progressing  Flowsheets  Taken 8/11/2024 1827  Skin Integrity Remains Intact: Monitor for areas of redness and/or skin breakdown  Taken 8/11/2024 0900  Skin Integrity Remains Intact: Monitor for areas of redness and/or skin breakdown     Problem: Skin/Tissue Integrity - Adult  Goal: Incisions, wounds, or drain sites healing without S/S of infection  8/11/2024 1827 by Rowena Butler, RN  Outcome: Progressing  Flowsheets  Taken 8/11/2024 1827  Incisions, Wounds, or Drain Sites Healing Without Sign and Symptoms of Infection:   ADMISSION and DAILY: Assess and  ordered  Taken 8/11/2024 0900  Electrolytes maintained within normal limits: Monitor labs and assess patient for signs and symptoms of electrolyte imbalances     Problem: Musculoskeletal - Adult  Goal: Return mobility to safest level of function  Outcome: Progressing  Flowsheets (Taken 8/11/2024 1827)  Return Mobility to Safest Level of Function:   Assess patient stability and activity tolerance for standing, transferring and ambulating with or without assistive devices   Ensure adequate protection for wounds/incisions during mobilization   Obtain physical therapy/occupational therapy consults as needed     Problem: Genitourinary - Adult  Goal: Absence of urinary retention  Outcome: Progressing  Flowsheets (Taken 8/11/2024 1827)  Absence of urinary retention:   Assess patient’s ability to void and empty bladder   Monitor intake/output and perform bladder scan as needed     Problem: Safety - Adult  Goal: Free from fall injury  8/11/2024 1827 by Rowena Butler, RN  Outcome: Progressing  Flowsheets (Taken 8/11/2024 1827)  Free From Fall Injury: Instruct family/caregiver on patient safety

## 2024-08-12 PROCEDURE — 6360000002 HC RX W HCPCS: Performed by: INTERNAL MEDICINE

## 2024-08-12 PROCEDURE — G0378 HOSPITAL OBSERVATION PER HR: HCPCS

## 2024-08-12 PROCEDURE — 99231 SBSQ HOSP IP/OBS SF/LOW 25: CPT | Performed by: INTERNAL MEDICINE

## 2024-08-12 PROCEDURE — 2580000003 HC RX 258: Performed by: INTERNAL MEDICINE

## 2024-08-12 PROCEDURE — 97110 THERAPEUTIC EXERCISES: CPT

## 2024-08-12 PROCEDURE — 96372 THER/PROPH/DIAG INJ SC/IM: CPT

## 2024-08-12 RX ADMIN — ENOXAPARIN SODIUM 30 MG: 100 INJECTION SUBCUTANEOUS at 09:58

## 2024-08-12 RX ADMIN — SODIUM CHLORIDE, PRESERVATIVE FREE 10 ML: 5 INJECTION INTRAVENOUS at 09:58

## 2024-08-12 RX ADMIN — SODIUM CHLORIDE, PRESERVATIVE FREE 10 ML: 5 INJECTION INTRAVENOUS at 19:27

## 2024-08-12 NOTE — PLAN OF CARE
Problem: Discharge Planning  Goal: Discharge to home or other facility with appropriate resources  8/12/2024 1538 by Jigna Coleman RN  Outcome: Progressing  Flowsheets (Taken 8/12/2024 1000)  Discharge to home or other facility with appropriate resources:   Identify barriers to discharge with patient and caregiver   Arrange for needed discharge resources and transportation as appropriate   Identify discharge learning needs (meds, wound care, etc)   Refer to discharge planning if patient needs post-hospital services based on physician order or complex needs related to functional status, cognitive ability or social support system     Problem: Skin/Tissue Integrity  Goal: Absence of new skin breakdown  Description: 1.  Monitor for areas of redness and/or skin breakdown  2.  Assess vascular access sites hourly  3.  Every 4-6 hours minimum:  Change oxygen saturation probe site  4.  Every 4-6 hours:  If on nasal continuous positive airway pressure, respiratory therapy assess nares and determine need for appliance change or resting period.  8/12/2024 1538 by Jigna Coleman RN  Outcome: Progressing     Problem: Skin/Tissue Integrity - Adult  Goal: Skin integrity remains intact  8/12/2024 1538 by Jigna Coleman RN  Outcome: Progressing  Flowsheets (Taken 8/12/2024 1000)  Skin Integrity Remains Intact:   Monitor for areas of redness and/or skin breakdown   Assess vascular access sites hourly     Problem: Skin/Tissue Integrity - Adult  Goal: Incisions, wounds, or drain sites healing without S/S of infection  8/12/2024 1538 by Jigna Coleman RN  Outcome: Progressing  Flowsheets (Taken 8/12/2024 1000)  Incisions, Wounds, or Drain Sites Healing Without Sign and Symptoms of Infection: TWICE DAILY: Assess and document skin integrity     Problem: Infection - Adult  Goal: Absence of infection at discharge  8/12/2024 1538 by Jigna Coleman RN  Outcome: Progressing  Flowsheets (Taken 8/12/2024 1000)  Absence of infection at discharge:    Assess and monitor for signs and symptoms of infection   Monitor lab/diagnostic results     Problem: Infection - Adult  Goal: Absence of infection during hospitalization  8/12/2024 1538 by Jigna Coleman RN  Outcome: Progressing  Flowsheets (Taken 8/12/2024 1000)  Absence of infection during hospitalization:   Assess and monitor for signs and symptoms of infection   Monitor lab/diagnostic results     Problem: Infection - Adult  Goal: Absence of fever/infection during anticipated neutropenic period  8/12/2024 1538 by Jigna Coleman RN  Outcome: Progressing  Flowsheets (Taken 8/12/2024 1000)  Absence of fever/infection during anticipated neutropenic period: Monitor white blood cell count     Problem: Metabolic/Fluid and Electrolytes - Adult  Goal: Electrolytes maintained within normal limits  8/12/2024 1538 by Jigna Coleman RN  Outcome: Progressing  Flowsheets (Taken 8/12/2024 1000)  Electrolytes maintained within normal limits:   Monitor labs and assess patient for signs and symptoms of electrolyte imbalances   Administer electrolyte replacement as ordered     Problem: Safety - Adult  Goal: Free from fall injury  8/12/2024 1538 by Jigna Coleman RN  Outcome: Progressing     Problem: Musculoskeletal - Adult  Goal: Return mobility to safest level of function  8/12/2024 1538 by Jigna Coleman RN  Outcome: Progressing  Flowsheets (Taken 8/12/2024 1000)  Return Mobility to Safest Level of Function:   Assess patient stability and activity tolerance for standing, transferring and ambulating with or without assistive devices   Assist with transfers and ambulation using safe patient handling equipment as needed   Ensure adequate protection for wounds/incisions during mobilization     Problem: Genitourinary - Adult  Goal: Absence of urinary retention  8/12/2024 1538 by Jigna Coleman RN  Outcome: Progressing  Flowsheets (Taken 8/12/2024 1000)  Absence of urinary retention:   Assess patient’s ability to void and empty bladder

## 2024-08-12 NOTE — PROGRESS NOTES
[facility]  Office: [phone]  Madhu Sweeney DO, Edson Rob, DO, Jhon Hinkle DO, Ulysses Tan DO, Faye Haywood MD, Doris Saeed MD, Yuliet Miranda MD, Angelika Smith MD,  Catarino Sainz MD, Gabriel Powers MD, Benny Anton MD,  Deepika Franz DO, Grace Coleman MD, Amando Ervin MD, Teja Sweeney DO, Jackie Pandey MD,  Narciso Kingston DO, Ora Danielson MD, Anastasiia Mejia MD, Dot Moran MD, Kory Kennedy MD,  Oswaldo Lombardi MD, [place] MD, Kendall Sweeney MD, Matt Quiroz MD, Pavel Stone MD, Elmer Agarwal MD, Manuel Hess DO, Vahe Cardoza DO, Janel oTrre MD,  Cameron Marte MD, Shirley Waterhouse, CNP,  Rhiannon Askew CNP, Constantin Bernal, CNP,  Taty Genao, OSVALDO, Pavithra De La Rosa, CNP, Chantale Bray, CNP, Patricia Gibson CNP, Katherine Maria CNP, Leanne Villarreal, PA-C, Randi Curran PA-C, Zoe Jones, CNP, Marika Smith, CNP, Julia Hair, CNP, Roselyn Charles, CNP, Hoa Bonilla, CNS, Pilar Schreiber, CNP, Hilda Cody CNP, Tracy Schwab, CNP         [facility]   IN-PATIENT SERVICE   [facility]    Progress Note    8/12/2024    12:48 PM    Name:   Ghazal Mcgowan  MRN:     [id number]     Acct:      [id number]   Room:   2026/2026-01  IP Day:  0  Admit Date:  8/9/2024  5:53 AM    PCP:   Shanika Boss APRN - CNP  Code Status:  Full Code    Subjective:     C/C:   Chief Complaint   Patient presents with    Medical Problem     Interval History Status: not changed.     Feels about the same  Just chronically weak    States she takes no meds at home    Brief History:     This 34 yof with progressive MS presents unable to care for herself. She is wheelchair and bed bound and her mother is primary caregiver, but she went out of town. Patient's brother was supposed to help take care of her but apparently is unable to do so. Therefore she presented to hospital for placement. She is not in exacerbation, she is at baseline.     Review of  Resp 19   Ht 1.778 m (5' 10\")   Wt 40.8 kg (90 lb)   SpO2 100%   BMI 12.91 kg/m²   Temp (24hrs), Av.3 °F (36.8 °C), Min:98.2 °F (36.8 °C), Max:98.4 °F (36.9 °C)    No results for input(s): \"POCGLU\" in the last 72 hours.    I/O (24Hr):  No intake or output data in the 24 hours ending 24 1248    Labs:  Hematology:No results for input(s): \"WBC\", \"RBC\", \"HGB\", \"HCT\", \"MCV\", \"MCH\", \"MCHC\", \"RDW\", \"PLT\", \"MPV\", \"SEDRATE\", \"CRP\", \"INR\", \"DDIMER\", \"EA3AFUWW\", \"LABABSO\" in the last 72 hours.    Invalid input(s): \"PT\"  Chemistry:  Recent Labs     24  1849      K 4.4      CO2 20   GLUCOSE 86   BUN 11   CREATININE 0.5   ANIONGAP 10   LABGLOM >90   CALCIUM 9.3   No results for input(s): \"LABALBU\", \"LABA1C\", \"N7EOMRK\", \"FT4\", \"TSH\", \"AST\", \"ALT\", \"LDH\", \"GGT\", \"ALKPHOS\", \"BILITOT\", \"BILIDIR\", \"AMMONIA\", \"AMYLASE\", \"LIPASE\", \"LACTATE\", \"CHOL\", \"HDL\", \"CHOLHDLRATIO\", \"TRIG\", \"VLDL\", \"IQT55OS\", \"PHENYTOIN\", \"PHENYF\", \"URICACID\", \"POCGLU\" in the last 72 hours.    Invalid input(s): \"PROT\", \"A1ZYOGY\", \"LABGGT\", \"LDLCHOLESTEROL\"  ABG:No results found for: \"POCPH\", \"PHART\", \"PH\", \"POCPCO2\", \"CXN4NVF\", \"PCO2\", \"POCPO2\", \"PO2ART\", \"PO2\", \"POCHCO3\", \"PCJ2LBY\", \"HCO3\", \"NBEA\", \"PBEA\", \"BEART\", \"BE\", \"THGBART\", \"THB\", \"IIQ5WRN\", \"NRJS8OKR\", \"A8CJYEOM\", \"O2SAT\", \"FIO2\"  No results found for: \"SPECIAL\"  No results found for: \"CULTURE\"    Radiology:  No results found.    Physical Examination:        General appearance:  alert, cooperative and no distress  Mental Status:  oriented to person, place and time and normal affect  Lungs:  clear to auscultation bilaterally, normal effort  Heart:  regular rate and rhythm, no murmur  Abdomen:  soft, nontender, nondistended, normal bowel sounds, no masses, hepatomegaly, splenomegaly  Extremities:  no edema, redness, tenderness in the calves; cachectic  Skin:  no gross lesions, rashes, induration    Assessment:        Hospital Problems             Last Modified POA    *

## 2024-08-12 NOTE — PLAN OF CARE
Pt slept throughout night with no complaints, bed mechanics in place, call light in reach, side rails up x2   Problem: Discharge Planning  Goal: Discharge to home or other facility with appropriate resources  8/12/2024 0451 by Mike Hinson RN  Outcome: Progressing  Flowsheets (Taken 8/11/2024 1931)  Discharge to home or other facility with appropriate resources:   Identify barriers to discharge with patient and caregiver   Arrange for needed discharge resources and transportation as appropriate   Identify discharge learning needs (meds, wound care, etc)   Refer to discharge planning if patient needs post-hospital services based on physician order or complex needs related to functional status, cognitive ability or social support system     Problem: Skin/Tissue Integrity  Goal: Absence of new skin breakdown  Description: 1.  Monitor for areas of redness and/or skin breakdown  2.  Assess vascular access sites hourly  3.  Every 4-6 hours minimum:  Change oxygen saturation probe site  4.  Every 4-6 hours:  If on nasal continuous positive airway pressure, respiratory therapy assess nares and determine need for appliance change or resting period.  8/12/2024 0451 by Mike Hinson RN  Outcome: Progressing     Problem: Skin/Tissue Integrity - Adult  Goal: Skin integrity remains intact  8/12/2024 0451 by Mike Hinson RN  Outcome: Progressing  Flowsheets (Taken 8/11/2024 1931)  Skin Integrity Remains Intact: Monitor for areas of redness and/or skin breakdown     Problem: Skin/Tissue Integrity - Adult  Goal: Incisions, wounds, or drain sites healing without S/S of infection  8/12/2024 0451 by Mike Hinson RN  Outcome: Progressing  Flowsheets (Taken 8/11/2024 1931)  Incisions, Wounds, or Drain Sites Healing Without Sign and Symptoms of Infection:   ADMISSION and DAILY: Assess and document risk factors for pressure ulcer development   TWICE DAILY: Assess and document skin integrity   Implement wound  care per orders     Problem: Infection - Adult  Goal: Absence of infection at discharge  8/12/2024 0451 by Mike Hinson RN  Outcome: Progressing  Flowsheets (Taken 8/11/2024 1931)  Absence of infection at discharge:   Assess and monitor for signs and symptoms of infection   Monitor lab/diagnostic results   Administer medications as ordered     Problem: Infection - Adult  Goal: Absence of infection during hospitalization  8/12/2024 0451 by Mike Hinson RN  Outcome: Progressing  Flowsheets (Taken 8/11/2024 1931)  Absence of infection during hospitalization:   Assess and monitor for signs and symptoms of infection   Monitor lab/diagnostic results   Administer medications as ordered     Problem: Infection - Adult  Goal: Absence of fever/infection during anticipated neutropenic period  8/12/2024 0451 by Mike Hinson RN  Outcome: Progressing  Flowsheets (Taken 8/11/2024 1931)  Absence of fever/infection during anticipated neutropenic period: Monitor white blood cell count     Problem: Metabolic/Fluid and Electrolytes - Adult  Goal: Electrolytes maintained within normal limits  8/12/2024 0451 by Mike Hinson RN  Outcome: Progressing  Flowsheets (Taken 8/11/2024 1931)  Electrolytes maintained within normal limits:   Monitor labs and assess patient for signs and symptoms of electrolyte imbalances   Administer electrolyte replacement as ordered     Problem: Safety - Adult  Goal: Free from fall injury  8/12/2024 0451 by Mike Hinson RN  Outcome: Progressing     Problem: Musculoskeletal - Adult  Goal: Return mobility to safest level of function  8/12/2024 0451 by Mike Hinson RN  Outcome: Progressing  Flowsheets (Taken 8/11/2024 1931)  Return Mobility to Safest Level of Function:   Assess patient stability and activity tolerance for standing, transferring and ambulating with or without assistive devices   Assist with transfers and ambulation using safe patient handling equipment

## 2024-08-12 NOTE — CARE COORDINATION
Social Work-Met with patient. She states that she wants to return home. She states that her brother will care for her. Sent text to her mom. Phone call to aunt. She states that she does not know when patient's mom will return from vacation. Her aunt has been unable to reach patient's  brother to verify that he will be able to provide care.  Roberto

## 2024-08-12 NOTE — PROGRESS NOTES
Occupational Therapy  Facility/Department: [facility]  Rehabilitation Occupational Therapy Daily Treatment Note    Date: 24  Patient Name: Ghazal Mcgowan       Room:   MRN: [id number]  Account: [id number]   : 1990  (34 y.o.) Gender: female   Past Medical History:  has a past medical history of Acquired central hypothyroidism, Arthritis, Asthma, Encounter for medication monitoring, Exacerbation of multiple sclerosis (HCC), Family history of breast cancer, OUMOU virus antibody positive, Lower paraplegia (HCC), Marijuana smoker, continuous, MS (multiple sclerosis) (HCC), Neuromuscular disorder (HCC), Primary chronic progressive multiple sclerosis (HCC), and Rash in adult.  Past Surgical History:   has no past surgical history on file.    Restrictions  Restrictions/Precautions: Fall Risk, General Precautions  Other position/activity restrictions: BMI 15.78- many bony prominences  Required Braces or Orthoses?: No    Subjective  Subjective: Pt. supine in bed and agreeable for treatment.  Restrictions/Precautions: Fall Risk;General Precautions    Objective     Cognition  Overall Cognitive Status: Exceptions  Arousal/Alertness: Appears intact  Following Commands: Follows all commands without difficulty  Attention Span: Appears intact  Memory: Appears intact  Safety Judgement: Impaired  Problem Solving: Impaired  Insights: Impaired  Initiation: Impaired  Sequencing: Requires cues for some  Cognition Comment: Pt. pleasant and followed commands.  Orientation  Overall Orientation Status: Within Functional Limits         ADL    OT Exercises  A/AROM Exercises: B UE AROM completed while sitting upright in bed. Pt. displayed good ROM at both shoulders, elbows, wrists and digits. Shoulder flexion/extension, elbow flexion/extension, and wrist/digit flexion/extension     Assessment  Assessment  Assessment: Pt. tolerated B UE exercises at bedside to promote functional strength and mobility. Pt. educated on

## 2024-08-12 NOTE — CARE COORDINATION
DC Planning    Spoke with pt at bedside, pt is a+o, from home w mom, brother, family-mom is on a cruise. Pt is WC /bedbound appears very frail. Agrees to rehab at [place]. Interested in getting her own apt-and would like a LSW assist in pursuing at some point.

## 2024-08-13 LAB
ANION GAP SERPL CALCULATED.3IONS-SCNC: 9 MMOL/L (ref 9–17)
BUN SERPL-MCNC: 13 MG/DL (ref 6–20)
BUN/CREAT SERPL: 22 (ref 9–20)
CALCIUM SERPL-MCNC: 9.1 MG/DL (ref 8.6–10.4)
CHLORIDE SERPL-SCNC: 106 MMOL/L (ref 98–107)
CO2 SERPL-SCNC: 24 MMOL/L (ref 20–31)
CREAT SERPL-MCNC: 0.6 MG/DL (ref 0.5–0.9)
ERYTHROCYTE [DISTWIDTH] IN BLOOD BY AUTOMATED COUNT: 13.2 % (ref 11.8–14.4)
GFR, ESTIMATED: >90 ML/MIN/1.73M2
GLUCOSE SERPL-MCNC: 65 MG/DL (ref 70–99)
HCT VFR BLD AUTO: 42.1 % (ref 36.3–47.1)
HGB BLD-MCNC: 13.7 G/DL (ref 11.9–15.1)
MCH RBC QN AUTO: 30.2 PG (ref 25.2–33.5)
MCHC RBC AUTO-ENTMCNC: 32.5 G/DL (ref 28.4–34.8)
MCV RBC AUTO: 92.9 FL (ref 82.6–102.9)
NRBC BLD-RTO: 0 PER 100 WBC
PLATELET # BLD AUTO: 221 K/UL (ref 138–453)
PMV BLD AUTO: 10.9 FL (ref 8.1–13.5)
POTASSIUM SERPL-SCNC: 3.8 MMOL/L (ref 3.7–5.3)
RBC # BLD AUTO: 4.53 M/UL (ref 3.95–5.11)
SODIUM SERPL-SCNC: 139 MMOL/L (ref 135–144)
WBC OTHER # BLD: 5.8 K/UL (ref 3.5–11.3)

## 2024-08-13 PROCEDURE — 6360000002 HC RX W HCPCS: Performed by: INTERNAL MEDICINE

## 2024-08-13 PROCEDURE — 96372 THER/PROPH/DIAG INJ SC/IM: CPT

## 2024-08-13 PROCEDURE — 99232 SBSQ HOSP IP/OBS MODERATE 35: CPT | Performed by: INTERNAL MEDICINE

## 2024-08-13 PROCEDURE — 85027 COMPLETE CBC AUTOMATED: CPT

## 2024-08-13 PROCEDURE — G0378 HOSPITAL OBSERVATION PER HR: HCPCS

## 2024-08-13 PROCEDURE — 36415 COLL VENOUS BLD VENIPUNCTURE: CPT

## 2024-08-13 PROCEDURE — 97110 THERAPEUTIC EXERCISES: CPT

## 2024-08-13 PROCEDURE — 2580000003 HC RX 258: Performed by: INTERNAL MEDICINE

## 2024-08-13 PROCEDURE — 97530 THERAPEUTIC ACTIVITIES: CPT

## 2024-08-13 PROCEDURE — 80048 BASIC METABOLIC PNL TOTAL CA: CPT

## 2024-08-13 RX ADMIN — ENOXAPARIN SODIUM 30 MG: 100 INJECTION SUBCUTANEOUS at 14:57

## 2024-08-13 RX ADMIN — SODIUM CHLORIDE, PRESERVATIVE FREE 10 ML: 5 INJECTION INTRAVENOUS at 11:32

## 2024-08-13 RX ADMIN — SODIUM CHLORIDE, PRESERVATIVE FREE 10 ML: 5 INJECTION INTRAVENOUS at 21:18

## 2024-08-13 NOTE — PROGRESS NOTES
Occupational Therapy  Facility/Department: [facility]  Daily Treatment Note  NAME: Ghazal Mcgowan  : 1990  MRN: [id number]    Date of Service: 2024    RN YEISON reports patient is medically stable for therapy treatment this date.   Chart reviewed prior to treatment and patient is agreeable for therapy.  All lines intact and patient positioned comfortably at end of treatment.  All patient needs addressed prior to ending therapy session.     Discharge Recommendations:  Patient would benefit from continued therapy after discharge         Patient Diagnosis(es): The primary encounter diagnosis was Unable to care for self. A diagnosis of Multiple sclerosis (HCC) was also pertinent to this visit.     Assessment    Assessment: Overall, pt tolerated session Fair and is progressing towards STGs. Facilited AAROM/AROM to BUEs in order to promote functional strength/ROM required to maintain IND with self feeding and simple hygiene/grooming tasks. Facilited repositioning in bed & rolling L<>R in supine for pressure relief/to maintain skin integrity. Continued skilled OT services are indicated at this time to maximize this pt's safety and participation in self care and to facilitate safe return to PLOF/prior living arrangement as able.  Activity Tolerance: Patient limited by endurance  Discharge Recommendations: Patient would benefit from continued therapy after discharge      Plan   Occupational Therapy Plan  Times Per Week: 3-4x/week  Current Treatment Recommendations: Strengthening;ROM;Balance training;Safety education & training;Patient/Caregiver education & training;Self-Care / ADL;Positioning  Additional Comments: Cont with stated POC     Restrictions  Restrictions/Precautions  Restrictions/Precautions: Fall Risk;General Precautions  Required Braces or Orthoses?: No  Position Activity Restriction  Other position/activity restrictions: Up w/ assist, RUE IV, BMI 15.78- many bony prominences    Subjective

## 2024-08-13 NOTE — CARE COORDINATION
Social [facility]- [facility] and will [facility] patient. They will begin auth. Roberto

## 2024-08-13 NOTE — PLAN OF CARE
Problem: Discharge Planning  Goal: Discharge to home or other facility with appropriate resources  Outcome: Progressing  Flowsheets (Taken 8/13/2024 0751)  Discharge to home or other facility with appropriate resources:   Identify barriers to discharge with patient and caregiver   Arrange for needed discharge resources and transportation as appropriate   Identify discharge learning needs (meds, wound care, etc)   Refer to discharge planning if patient needs post-hospital services based on physician order or complex needs related to functional status, cognitive ability or social support system     Problem: Skin/Tissue Integrity  Goal: Absence of new skin breakdown  Description: 1.  Monitor for areas of redness and/or skin breakdown  2.  Assess vascular access sites hourly  3.  Every 4-6 hours minimum:  Change oxygen saturation probe site  4.  Every 4-6 hours:  If on nasal continuous positive airway pressure, respiratory therapy assess nares and determine need for appliance change or resting period.  Outcome: Progressing     Problem: Skin/Tissue Integrity - Adult  Goal: Skin integrity remains intact  Outcome: Not Progressing  Flowsheets  Taken 8/13/2024 1833  Skin Integrity Remains Intact: Monitor for areas of redness and/or skin breakdown  Taken 8/13/2024 0751  Skin Integrity Remains Intact: Monitor for areas of redness and/or skin breakdown  Goal: Incisions, wounds, or drain sites healing without S/S of infection  Outcome: Progressing  Flowsheets (Taken 8/13/2024 0751)  Incisions, Wounds, or Drain Sites Healing Without Sign and Symptoms of Infection: TWICE DAILY: Assess and document dressing/incision, wound bed, drain sites and surrounding tissue     Problem: Infection - Adult  Goal: Absence of infection at discharge  Outcome: Progressing  Flowsheets (Taken 8/13/2024 0751)  Absence of infection at discharge: Assess and monitor for signs and symptoms of infection  Goal: Absence of infection during

## 2024-08-13 NOTE — DISCHARGE SUMMARY
[facility]  Office: [phone]  Madhu Sweeney DO, Edson Rob DO, Jhon Hinkle DO, Ulysses Tan DO, Faye Haywood MD, Doris Saeed MD, Yuliet Miranda MD, Angelika Smith MD,  Catarino Sainz MD, Gabriel Powers MD, Benny Anton MD,  Deepika Franz DO, Grace Coleman MD, Amando Ervin MD, Teja Sweeney DO, Jackie Pandey MD,  Narciso Kingston DO, Ora Danielson MD, Anastasiia Mejia MD, Dot Moran MD, Kory Kennedy MD,  Oswaldo Lombardi MD, [place] MD, Kendall Sweeney MD, Matt Quiroz MD, Pavel Stone MD, Elmer Agarwal MD, Manuel Hess DO, Vahe Cardoza DO, Janel Torre MD,  Cameron Marte MD, Shirley Waterhouse, CNP,  Rhiannon Askew CNP, Constantin Bernal, CNP,  Taty Genao, OSVALDO, Pavithra De La Rosa, CNP, Chantale Bray, CNP, Patricia Gibson CNP, Katherine Maria CNP, Leanne Villarreal, PA-C, Randi Curran PA-C, Zoe Jones, CNP, Marika Smith, CNP, Julia Hair, CNP, Roselyn Charles, CNP, Hoa Bonilla, CNS, Pilar Schreiber, CNP, Hilda Cody CNP, Tracy Schwab, CNP         [facility]   IN-PATIENT SERVICE   [facility]    Discharge Summary     Patient ID: Ghazal Mcgowan  :  1990   MRN: [id number]     ACCOUNT:  [id number]   Patient's PCP: Shanika Boss APRN - CNP  Admit Date: 2024   Discharge Date: 8/15/2024     Length of Stay: 0  Code Status:  Prior  Admitting Physician: Faye Haywood MD  Discharge Physician: Faye Haywood MD     Active Discharge Diagnoses:     Hospital Problem Lists:  Principal Problem:    Unable to care for self  Active Problems:    Marijuana smoker, continuous    Acquired central hypothyroidism    Primary chronic progressive multiple sclerosis (HCC)    Lower paraplegia (HCC)    Generalized weakness    Severe malnutrition (HCC)  Resolved Problems:    * No resolved hospital problems. *     UTI    Admission Condition:  fair     Discharged Condition: stable    Hospital Stay:   Admitting history;  This 34 yf  \"TBIL\", \"DBILCALC\"  CMP:    Lab Results   Component Value Date/Time    GLUCOSE 65 08/13/2024 12:05 PM     08/13/2024 12:05 PM    K 3.8 08/13/2024 12:05 PM     08/13/2024 12:05 PM    CO2 24 08/13/2024 12:05 PM    BUN 13 08/13/2024 12:05 PM    CREATININE 0.6 08/13/2024 12:05 PM    ANIONGAP 9 08/13/2024 12:05 PM    ALKPHOS 66 01/06/2024 04:25 AM    ALT 16 01/06/2024 04:25 AM    AST 15 01/06/2024 04:25 AM    BILITOT 0.2 01/06/2024 04:25 AM    ALBUMIN 4.0 01/06/2024 04:25 AM    LABGLOM >90 08/13/2024 12:05 PM    LABGLOM >60 01/07/2024 06:21 AM    GFRAA >60 07/15/2022 10:21 PM    GFR      07/15/2022 10:21 PM    CALCIUM 9.1 08/13/2024 12:05 PM     PT/INR:    Lab Results   Component Value Date/Time    PROTIME 11.3 11/06/2019 06:14 AM    INR 1.1 11/06/2019 06:14 AM     PTT: No results found for: \"APTT\"  FLP:  No results found for: \"CHOL\", \"TRIG\", \"HDL\"  U/A:    Lab Results   Component Value Date/Time    COLORU Yellow 08/14/2024 10:50 AM    TURBIDITY Clear 08/14/2024 10:50 AM    HGBUR 1+ 08/14/2024 10:50 AM    PHUR 6.0 08/14/2024 10:50 AM    PHUR 6.0 01/06/2024 03:48 PM    PROTEINU NEGATIVE 08/14/2024 10:50 AM    GLUCOSEU NEGATIVE 08/14/2024 10:50 AM    KETUA NEGATIVE 08/14/2024 10:50 AM    BILIRUBINUR NEGATIVE 08/14/2024 10:50 AM    UROBILINOGEN Normal 08/14/2024 10:50 AM    NITRU NEGATIVE 08/14/2024 10:50 AM    LEUKOCYTESUR NEGATIVE 08/14/2024 10:50 AM     TSH:    Lab Results   Component Value Date/Time    TSH 0.76 01/07/2024 06:21 AM        Radiology:  No results found.    Consultations:    Consults:     Final Specialist Recommendations/Findings:   IP CONSULT TO SOCIAL WORK  IP CONSULT TO INTERNAL MEDICINE      The patient was seen and examined on day of discharge and this discharge summary is in conjunction with any daily progress note from day of discharge.    Discharge plan:     Disposition: SNF    Physician Follow Up:     Shanika Boss, APRN - CNP  [address] [facility] [facility] [address]  [address]  [facility] [place]

## 2024-08-13 NOTE — PROGRESS NOTES
[facility]  Office: [phone]  Madhu Sweeney DO, Edson Rob, DO, Jhon Hinkle DO, Ulysses Tan DO, Faye Haywood MD, Doris Saeed MD, Yuliet Miranda MD, Angelika Smith MD,  Catarino Sainz MD, Gabriel Powers MD, Benny Anton MD,  Deepika Franz DO, Grace Coleman MD, Amando Ervin MD, Teja Sweeney DO, Jackie Pandey MD,  Narciso Kingston DO, Ora Danielson MD, Anastasiia Mejia MD, Dot Moran MD, Kory Kennedy MD,  Oswaldo Lombardi MD, [place] MD, Kendall Sweeney MD, Matt Quiroz MD, Pavel Stone MD, Elmer Agarwal MD, Manuel Hess DO, Vahe Cardoza DO, Janel Torre MD,  Cameron Marte MD, Shirley Waterhouse, CNP,  Rhiannon Askew CNP, Constantin Beranl, CNP,  Taty Genao, OSVALDO, Pavithra De La Rosa, CNP, Chantale Bray, CNP, Patricia Gibson CNP, Katherine Maria, CNP, Leanne Villarreal, PA-C, Randi Curran PA-C, Zoe Jones, CNP, Marika Smith, CNP, Julia Hair, CNP, Roselyn Charles, CNP, Hoa Bonilla, CNS, Pilar Schreiber, CNP, Hilda Cody CNP, Tracy Schwab, CNP         [facility]   IN-PATIENT SERVICE   [facility]    Progress Note    8/13/2024    12:37 PM    Name:   Ghazal Mcgowan  MRN:     [id number]     Acct:      [id number]   Room:   2026/2026-01  IP Day:  0  Admit Date:  8/9/2024  5:53 AM    PCP:   Shanika Boss APRN - CNP  Code Status:  Full Code    Subjective:     C/C:   Chief Complaint   Patient presents with    Medical Problem     Interval History Status: not changed.   Denies any new problem  Chronically weak  Waiting for pre-CERT for going to [facility]    Brief History:   This 34 yof with progressive MS presents unable to care for herself. She is wheelchair and bed bound and her mother is primary caregiver, but she went out of town. Patient's brother was supposed to help take care of her but apparently is unable to do so. Therefore she presented to hospital for placement. She is not in exacerbation, she is at baseline.       Review of  Systems:     Constitutional:  negative for chills, fevers, sweats  Respiratory:  negative for cough, dyspnea on exertion, shortness of breath, wheezing  Cardiovascular:  negative for chest pain, chest pressure/discomfort, lower extremity edema, palpitations  Gastrointestinal:  negative for abdominal pain, constipation, diarrhea, nausea, vomiting  Neurological:  negative for dizziness, headache    Medications:     Allergies:    Allergies   Allergen Reactions    Ativan [Lorazepam]     Benadryl [Diphenhydramine] Swelling     Facial        Current Meds:   Scheduled Meds:    sodium chloride flush  5-40 mL IntraVENous 2 times per day    enoxaparin  30 mg SubCUTAneous Daily     Continuous Infusions:    sodium chloride       PRN Meds: senna, sodium chloride flush, sodium chloride, potassium chloride **OR** potassium alternative oral replacement **OR** potassium chloride, magnesium sulfate, ondansetron **OR** ondansetron, polyethylene glycol, acetaminophen **OR** acetaminophen    Data:     Past Medical History:   has a past medical history of Acquired central hypothyroidism, Arthritis, Asthma, Encounter for medication monitoring, Exacerbation of multiple sclerosis (HCC), Family history of breast cancer, OUMOU virus antibody positive, Lower paraplegia (HCC), Marijuana smoker, continuous, MS (multiple sclerosis) (HCC), Neuromuscular disorder (HCC), Primary chronic progressive multiple sclerosis (HCC), and Rash in adult.    Social History:   reports that she has quit smoking. Her smoking use included cigarettes. She has a 15 pack-year smoking history. She has never used smokeless tobacco. She reports current drug use. Drug: Marijuana (Weed). She reports that she does not drink alcohol.     Family History:   Family History   Problem Relation Age of Onset    No Known Problems Mother     Stroke Father     No Known Problems Sister     No Known Problems Brother        Vitals:  /61   Pulse 89   Temp 99.1 °F (37.3 °C) (Oral)

## 2024-08-13 NOTE — PROGRESS NOTES
Comprehensive Nutrition Assessment    Type and Reason for Visit:  Reassess    Nutrition Recommendations/Plan:   Regular diet  Start Ensure Plus High Protein at breakfast and dinner  Monitor p.o intakes, wound status and labs     Malnutrition Assessment:  Malnutrition Status:  Insufficient data (08/13/24 1860)        Nutrition Assessment:    Patient admission is related to multiple sclerosis and inability to care for herself. Patient has a medical history for lower paraplegia and neuromuscular disorder. Patient lives with family. Positive nutrition screen received for wounds. Patient has a stage 2 pressure ulcer on right hip. Patient reports a good appetite and has been eating well at meals. Patient reports eating 100% at breakfast and lunch today. Patient also mentioned wanting fried chicken and french fries. Patient encouraged to eat quaility sources of protein for wound healing. Patient requested Ensure Plus High Protein. Continue Regular diet and start Ensure Plus High Protein 2x/day. Monitor p.o intakes and labs.    Nutrition Related Findings:    Sclaral edema. Active bowel sounds. Multiple sclerosis. Right hip wound Wound Type: Stage II (right hip)       Current Nutrition Intake & Therapies:    Average Meal Intake: %  Average Supplements Intake: None Ordered  ADULT DIET; Regular  ADULT ORAL NUTRITION SUPPLEMENT; Breakfast, Dinner; Standard High Calorie/High Protein Oral Supplement    Anthropometric Measures:  Height: 177.8 cm (5' 10\")  Ideal Body Weight (IBW): 150 lbs (68 kg)       Current Body Weight: 46.3 kg (102 lb), 68 % IBW. Weight Source: Bed Scale  Current BMI (kg/m2): 14.6                          BMI Categories: Underweight (BMI less than 18.5)    Estimated Daily Nutrient Needs:  Energy Requirements Based On: Kcal/kg  Weight Used for Energy Requirements: Current  Energy (kcal/day): 1162-6538 kcal (30-35 kcal/kg)  Weight Used for Protein Requirements: Current  Protein (g/day): 65-74 gm of protein

## 2024-08-14 LAB
BACTERIA URNS QL MICRO: ABNORMAL
BILIRUB UR QL STRIP: NEGATIVE
CLARITY UR: CLEAR
COLOR UR: YELLOW
EPI CELLS #/AREA URNS HPF: ABNORMAL /HPF (ref 0–5)
GLUCOSE UR STRIP-MCNC: NEGATIVE MG/DL
HGB UR QL STRIP.AUTO: ABNORMAL
KETONES UR STRIP-MCNC: NEGATIVE MG/DL
LEUKOCYTE ESTERASE UR QL STRIP: NEGATIVE
MUCOUS THREADS URNS QL MICRO: ABNORMAL
NITRITE UR QL STRIP: NEGATIVE
PH UR STRIP: 6 [PH] (ref 5–8)
PROT UR STRIP-MCNC: NEGATIVE MG/DL
RBC #/AREA URNS HPF: ABNORMAL /HPF (ref 0–2)
SP GR UR STRIP: 1.04 (ref 1–1.03)
UROBILINOGEN UR STRIP-ACNC: NORMAL EU/DL (ref 0–1)
WBC #/AREA URNS HPF: ABNORMAL /HPF (ref 0–5)

## 2024-08-14 PROCEDURE — 97530 THERAPEUTIC ACTIVITIES: CPT

## 2024-08-14 PROCEDURE — 6360000002 HC RX W HCPCS: Performed by: INTERNAL MEDICINE

## 2024-08-14 PROCEDURE — 97535 SELF CARE MNGMENT TRAINING: CPT

## 2024-08-14 PROCEDURE — 81001 URINALYSIS AUTO W/SCOPE: CPT

## 2024-08-14 PROCEDURE — 99232 SBSQ HOSP IP/OBS MODERATE 35: CPT | Performed by: INTERNAL MEDICINE

## 2024-08-14 PROCEDURE — 2580000003 HC RX 258: Performed by: INTERNAL MEDICINE

## 2024-08-14 PROCEDURE — G0378 HOSPITAL OBSERVATION PER HR: HCPCS

## 2024-08-14 PROCEDURE — 87086 URINE CULTURE/COLONY COUNT: CPT

## 2024-08-14 PROCEDURE — 87077 CULTURE AEROBIC IDENTIFY: CPT

## 2024-08-14 PROCEDURE — 97110 THERAPEUTIC EXERCISES: CPT

## 2024-08-14 PROCEDURE — 87186 SC STD MICRODIL/AGAR DIL: CPT

## 2024-08-14 PROCEDURE — 82947 ASSAY GLUCOSE BLOOD QUANT: CPT

## 2024-08-14 PROCEDURE — 96372 THER/PROPH/DIAG INJ SC/IM: CPT

## 2024-08-14 RX ADMIN — ENOXAPARIN SODIUM 30 MG: 100 INJECTION SUBCUTANEOUS at 14:23

## 2024-08-14 RX ADMIN — SODIUM CHLORIDE, PRESERVATIVE FREE 10 ML: 5 INJECTION INTRAVENOUS at 09:23

## 2024-08-14 RX ADMIN — SODIUM CHLORIDE, PRESERVATIVE FREE 10 ML: 5 INJECTION INTRAVENOUS at 19:25

## 2024-08-14 NOTE — CARE COORDINATION
Social Work-[facility]. They have not received auth. Discussed if they would be able to accept patient under her Medicaid, if [employer] denies. They will not have a Medicaid bed. Left message for mom to discuss other options, if pt is denied. Roberto

## 2024-08-14 NOTE — PROGRESS NOTES
Physical Therapy  Facility/Department: [facility]  Daily Treatment Note  NAME: Ghazal Mcgowan  : 1990  MRN: [id number]    Date of Service: 2024    Discharge Recommendations:  Patient would benefit from continued therapy after discharge   PT Equipment Recommendations  Equipment Needed: Yes (celio lift (pt had one but her grandpa needed it))    Patient Diagnosis(es): The primary encounter diagnosis was Unable to care for self. A diagnosis of Multiple sclerosis (HCC) was also pertinent to this visit.    Assessment   Assessment: Pt motivated and hopeful to be able to sit up  on edge of bed. Pt with fair sitting balance which required at times mod assist x 2 and at time SBA +1. Attempted working on unilateral UE support while sitting with using dance moves on the opposite UE. Pt playing her play list during treatment.  Would continue to recommend therapy to moxiimize current function level.  Activity Tolerance: Other (comment) (limited by disease process)    Equipment Needed: Yes (celio lift (pt had one but her grandpa needed it))     Plan    Physical Therapy Plan  General Plan: 2-3 times per week  Specific Instructions for Next Treatment: ROM, DF stretches, seated balance.  Current Treatment Recommendations: Strengthening;Balance training;Transfer training;Endurance training;Neuromuscular re-education;Safety education & training;Patient/Caregiver education & training;Therapeutic activities;Co-Treatment     Restrictions  Restrictions/Precautions  Restrictions/Precautions: Fall Risk, General Precautions  Required Braces or Orthoses?: No  Position Activity Restriction  Other position/activity restrictions: Up w/ assist, RUE IV, BMI 15.78- many bony prominences     Subjective    Subjective  Subjective: Pt reporting she would love to sit up on edge fo bed this am.  Pain: no new pain -> just her chronic pain  Orientation  Overall Orientation Status: Within Functional Limits     Objective   Vitals     Bed Mobility

## 2024-08-14 NOTE — PLAN OF CARE
Problem: Discharge Planning  Goal: Discharge to home or other facility with appropriate resources  Outcome: Progressing     Problem: Skin/Tissue Integrity  Goal: Absence of new skin breakdown  Description: 1.  Monitor for areas of redness and/or skin breakdown  2.  Assess vascular access sites hourly  3.  Every 4-6 hours minimum:  Change oxygen saturation probe site  4.  Every 4-6 hours:  If on nasal continuous positive airway pressure, respiratory therapy assess nares and determine need for appliance change or resting period.  Outcome: Progressing     Problem: Skin/Tissue Integrity - Adult  Goal: Skin integrity remains intact  Outcome: Progressing  Flowsheets (Taken 8/14/2024 9255)  Skin Integrity Remains Intact: Monitor for areas of redness and/or skin breakdown  Goal: Incisions, wounds, or drain sites healing without S/S of infection  Outcome: Progressing     Problem: Infection - Adult  Goal: Absence of infection at discharge  Outcome: Progressing  Goal: Absence of infection during hospitalization  Outcome: Progressing  Goal: Absence of fever/infection during anticipated neutropenic period  Outcome: Progressing     Problem: Skin/Tissue Integrity - Adult  Goal: Incisions, wounds, or drain sites healing without S/S of infection  Outcome: Progressing     Problem: Metabolic/Fluid and Electrolytes - Adult  Goal: Electrolytes maintained within normal limits  Outcome: Progressing     Problem: Musculoskeletal - Adult  Goal: Return mobility to safest level of function  Outcome: Progressing     Problem: Genitourinary - Adult  Goal: Absence of urinary retention  Outcome: Progressing     Problem: Neurosensory - Adult  Goal: Achieves stable or improved neurological status  Outcome: Progressing     Problem: Safety - Adult  Goal: Free from fall injury  Outcome: Progressing     Problem: Nutrition Deficit:  Goal: Optimize nutritional status  Outcome: Progressing

## 2024-08-14 NOTE — CARE COORDINATION
Social Work-Spoke with mom regarding dc plans. She confirms that she would like [facility]. [facility] not [facility]. Roberto

## 2024-08-14 NOTE — PROGRESS NOTES
Occupational Therapy  Facility/Department: [facility]  Daily Treatment Note  NAME: Ghazal Mcgowan  : 1990  MRN: [id number]    Date of Service: 2024    RN YEISON reports patient is medically stable for therapy treatment this date.  Chart reviewed prior to treatment and patient is agreeable for therapy.  All lines intact and patient positioned comfortably at end of treatment.  All patient needs addressed prior to ending therapy session.     Discharge Recommendations:  Patient would benefit from continued therapy after discharge         Patient Diagnosis(es): The primary encounter diagnosis was Unable to care for self. A diagnosis of Multiple sclerosis (HCC) was also pertinent to this visit.     Assessment    Assessment: Overall, pt tolerated session Fair and is progressing towards STGs. Pt able to sit at EOB w/ ModAx2 and maintain seated balance w/ ModAx1-2, with brief and intermittent episodes of SBA. Tolerated sitting at EOB x 7 minutes. Once back to bed, facilitated BUE exercises to promote functional strength/ROM required to maintain IND with self feeding and simple hygiene/grooming tasks, and pt set up to eat lunch at session end. Continued skilled OT services are indicated at this time to maximize this pt's safety and participation in self care and to facilitate safe return to PLOF/prior living arrangement as able.  Activity Tolerance: Other (comment) (Limited by disease process)  Discharge Recommendations: Patient would benefit from continued therapy after discharge      Plan   Occupational Therapy Plan  Times Per Week: 3-4x/week  Current Treatment Recommendations: Strengthening;ROM;Balance training;Safety education & training;Patient/Caregiver education & training;Self-Care / ADL;Positioning  Additional Comments: Cont with stated POC     Restrictions  Restrictions/Precautions  Restrictions/Precautions: Fall Risk;General Precautions  Required Braces or Orthoses?: No  Position Activity Restriction  Other

## 2024-08-14 NOTE — CARE COORDINATION
DC Planning    Spoke with pt at bedside, pt mom is back in town and did visit pt yesterday however pt and mom agree pt will still need to go to rehab. [facility] and [facility] pending.

## 2024-08-14 NOTE — PROGRESS NOTES
[facility]  Office: [phone]  Madhu Sweeney DO, Edson Rob, DO, Jhon Hinkle DO, Ulysses Tan, DO, Faye Haywood MD, Doris Saeed MD, Yuliet Miranda MD, Angelika Smith MD,  Catarino Sainz MD, Gabriel Powers MD, Benny Anton MD,  Deepika Franz DO, Grace Coleman MD, Amando Ervin MD, Teja Sweeney DO, Jackie Pandey MD,  Narciso Kingston DO, Ora Danielson MD, Anastasiia Mejia MD, Dot Moran MD, Kory Kennedy MD,  Oswaldo Lombardi MD, [place] MD, Kendall Sweeney MD, Matt Quiroz MD, Pavel Stone MD, Elmer Agarwal MD, Manuel Hess DO, Vahe Cardoza DO, Janel Torre MD,  Cameron Marte MD, Shirley Waterhouse, CNP,  Rhiannon Askew CNP, Constantin Bernal, CNP,  Taty Genao, OSVALDO, Pavithra De La Rosa, CNP, Chantale Bray, CNP, Patricia Gibson CNP, Katherine Maria, CNP, Leanne Villarreal, PA-C, Randi Curran PA-C, Zoe Jones, CNP, Marika Smith, CNP, Julia Hair, CNP, Roselyn Charles, CNP, Hoa Bonilla, CNS, Pilar Schreiber, CNP, Hilda Cody CNP, Tracy Schwab, CNP         [facility]   IN-PATIENT SERVICE   [facility]    Progress Note    8/14/2024    1:51 PM    Name:   Ghazal Mcgowan  MRN:     [id number]     Acct:      [id number]   Room:   2026/2026-01  IP Day:  0  Admit Date:  8/9/2024  5:53 AM    PCP:   Shanika Boss APRN - CNP  Code Status:  Full Code    Subjective:     C/C:   Chief Complaint   Patient presents with    Medical Problem     Interval History Status: not changed.   Per nurse urine had strong smell today, being sent for UA and culture  Chronically weak  Still waiting for pre-CERT for going to ECF  Blood sugar on BMP was 65, eating breakfast, repeat blood sugar is 85  Brief History:   This 34 yf with progressive MS presents unable to care for herself. She is wheelchair and bed bound and her mother is primary caregiver, but she went out of town. Patient's brother was supposed to help take care of her but apparently is unable to do

## 2024-08-14 NOTE — CARE COORDINATION
Social Work-[facility]. [facility] have not received auth. Left message for mom. Roberto

## 2024-08-15 VITALS
DIASTOLIC BLOOD PRESSURE: 59 MMHG | SYSTOLIC BLOOD PRESSURE: 100 MMHG | HEART RATE: 74 BPM | TEMPERATURE: 98.1 F | RESPIRATION RATE: 16 BRPM | HEIGHT: 70 IN | BODY MASS INDEX: 13.03 KG/M2 | WEIGHT: 91 LBS | OXYGEN SATURATION: 99 %

## 2024-08-15 LAB — GLUCOSE BLD-MCNC: 86 MG/DL (ref 65–105)

## 2024-08-15 PROCEDURE — 6360000002 HC RX W HCPCS: Performed by: INTERNAL MEDICINE

## 2024-08-15 PROCEDURE — 99232 SBSQ HOSP IP/OBS MODERATE 35: CPT | Performed by: INTERNAL MEDICINE

## 2024-08-15 PROCEDURE — 96372 THER/PROPH/DIAG INJ SC/IM: CPT

## 2024-08-15 PROCEDURE — G0378 HOSPITAL OBSERVATION PER HR: HCPCS

## 2024-08-15 PROCEDURE — 6370000000 HC RX 637 (ALT 250 FOR IP): Performed by: INTERNAL MEDICINE

## 2024-08-15 RX ADMIN — ACETAMINOPHEN 650 MG: 325 TABLET ORAL at 10:34

## 2024-08-15 RX ADMIN — ENOXAPARIN SODIUM 30 MG: 100 INJECTION SUBCUTANEOUS at 10:35

## 2024-08-15 ASSESSMENT — PAIN SCALES - GENERAL: PAINLEVEL_OUTOF10: 3

## 2024-08-15 ASSESSMENT — PAIN - FUNCTIONAL ASSESSMENT: PAIN_FUNCTIONAL_ASSESSMENT: ACTIVITIES ARE NOT PREVENTED

## 2024-08-15 ASSESSMENT — PAIN DESCRIPTION - ORIENTATION: ORIENTATION: RIGHT

## 2024-08-15 ASSESSMENT — PAIN DESCRIPTION - LOCATION: LOCATION: FINGER (COMMENT WHICH ONE)

## 2024-08-15 NOTE — PLAN OF CARE
Problem: Discharge Planning  Goal: Discharge to home or other facility with appropriate resources  8/14/2024 2116 by Brii Woodward RN  Outcome: Progressing  Flowsheets (Taken 8/14/2024 1955)  Discharge to home or other facility with appropriate resources:   Identify barriers to discharge with patient and caregiver   Arrange for needed discharge resources and transportation as appropriate   Identify discharge learning needs (meds, wound care, etc)  8/14/2024 1647 by Margaret Coleman RN  Outcome: Progressing  Flowsheets (Taken 8/14/2024 0807)  Discharge to home or other facility with appropriate resources:   Identify barriers to discharge with patient and caregiver   Arrange for needed discharge resources and transportation as appropriate   Identify discharge learning needs (meds, wound care, etc)   Refer to discharge planning if patient needs post-hospital services based on physician order or complex needs related to functional status, cognitive ability or social support system     Problem: Skin/Tissue Integrity  Goal: Absence of new skin breakdown  Description: 1.  Monitor for areas of redness and/or skin breakdown  2.  Assess vascular access sites hourly  3.  Every 4-6 hours minimum:  Change oxygen saturation probe site  4.  Every 4-6 hours:  If on nasal continuous positive airway pressure, respiratory therapy assess nares and determine need for appliance change or resting period.  8/14/2024 2116 by Brii Woodward, RN  Outcome: Progressing  8/14/2024 1647 by Margaret Coleman RN  Outcome: Progressing     Problem: Skin/Tissue Integrity - Adult  Goal: Skin integrity remains intact  8/14/2024 2116 by Brii Woodward, RN  Outcome: Progressing  Flowsheets  Taken 8/14/2024 2116  Skin Integrity Remains Intact:   Monitor for areas of redness and/or skin breakdown   Assess vascular access sites hourly  Taken 8/14/2024 1955  Skin Integrity Remains Intact:   Monitor for areas of redness and/or skin breakdown   Assess  fluid volume within ordered parameters to optimize cerebral perfusion and minimize risk of hemorrhage   Monitor temperature, glucose, and sodium. Initiate appropriate interventions as ordered  8/14/2024 1647 by Margaret Coleman, RN  Outcome: Progressing  Flowsheets (Taken 8/14/2024 0807)  Achieves stable or improved neurological status: Assess for and report changes in neurological status     Problem: Nutrition Deficit:  Goal: Optimize nutritional status  8/14/2024 2116 by Brii Woodward, RN  Outcome: Progressing  8/14/2024 1647 by Margaret Coleman, RN  Outcome: Progressing

## 2024-08-15 NOTE — CARE COORDINATION
Social Work-[facility] received auth and can admit today. [facility] will transport at 4PM. Orders faxed. Nurse to call report to [phone]. HENS completed. Patient and mom are agreeable with dc plans. Roberto

## 2024-08-15 NOTE — PROGRESS NOTES
[facility]  Office: [phone]  Madhu Sweeney DO, Edson Rob, DO, Jhon Hinkle DO, Ulysses Tan, DO, Faye Haywood MD, Doris Saeed MD, Yuliet Miranda MD, Angelika Smith MD,  Catarino Sainz MD, Gabriel Powers MD, Benny Anton MD,  Deepika Franz DO, Grace Coleman MD, Amando Ervin MD, Teja Sweeney DO, Jackie Pandey MD,  Narciso Kingston DO, Ora Danielson MD, Anastasiia Mejia MD, Dot Moran MD, Kory Kennedy MD,  Oswaldo Lombardi MD, [place] MD, Kendall Sweeney MD, Matt Quiroz MD, Pavel Stone MD, Elmer Agarwal MD, Manuel Hess DO, Vahe Cardoza DO, Janel Torre MD,  Cameron Marte MD, Shirley Waterhouse, CNP,  Rhiannon Askew CNP, Constantin Bernal, CNP,  Taty Genao, OSVALDO, Pavithra De La Rosa, CNP, Chantale Bray, CNP, Patricia Gibson CNP, Katherine Maria CNP, Leanne Villarreal, PA-C, Randi Curran PA-C, Zoe Jones, CNP, Marika Smith, CNP, Julia Hair, CNP, Roselyn Charles, CNP, Hoa Bonilla, CNS, Pilar Schreiber, CNP, Hilda oCdy CNP, Tracy Schwab, CNP         [facility]   IN-PATIENT SERVICE   [facility]    Progress Note    8/15/2024    1:45 PM    Name:   Ghazal Mcgowan  MRN:     [id number]     Acct:      [id number]   Room:   2105/2105-01   Day:  0  Admit Date:  8/9/2024  5:53 AM    PCP:   Shanika Boss APRN - CNP  Code Status:  Full Code    Subjective:     C/C:   Chief Complaint   Patient presents with    Medical Problem     Interval History Status: not changed.   Per nurse urine had strong smell yesterday, sent for UA and culture  Chronically weak  Still waiting for pre-CERT for going to [facility]  Blood sugar today is 86  Brief History:   This 34 yf with progressive MS presents unable to care for herself. She is wheelchair and bed bound and her mother is primary caregiver, but she went out of town. Patient's brother was supposed to help take care of her but apparently is unable to do so. Therefore she presented to hospital for  placement. She is not in exacerbation, she is at baseline.       Review of Systems:     Constitutional:  negative for chills, fevers, sweats  Respiratory:  negative for cough, dyspnea on exertion, shortness of breath, wheezing  Cardiovascular:  negative for chest pain, chest pressure/discomfort, lower extremity edema, palpitations  Gastrointestinal:  negative for abdominal pain, constipation, diarrhea, nausea, vomiting  Neurological:  negative for dizziness, headache    Medications:     Allergies:    Allergies   Allergen Reactions    Ativan [Lorazepam]     Benadryl [Diphenhydramine] Swelling     Facial        Current Meds:   Scheduled Meds:    sodium chloride flush  5-40 mL IntraVENous 2 times per day    enoxaparin  30 mg SubCUTAneous Daily     Continuous Infusions:    sodium chloride       PRN Meds: senna, sodium chloride flush, sodium chloride, potassium chloride **OR** potassium alternative oral replacement **OR** potassium chloride, magnesium sulfate, ondansetron **OR** ondansetron, polyethylene glycol, acetaminophen **OR** acetaminophen    Data:     Past Medical History:   has a past medical history of Acquired central hypothyroidism, Arthritis, Asthma, Encounter for medication monitoring, Exacerbation of multiple sclerosis (HCC), Family history of breast cancer, OUMOU virus antibody positive, Lower paraplegia (HCC), Marijuana smoker, continuous, MS (multiple sclerosis) (HCC), Neuromuscular disorder (HCC), Primary chronic progressive multiple sclerosis (HCC), and Rash in adult.    Social History:   reports that she has quit smoking. Her smoking use included cigarettes. She has a 15 pack-year smoking history. She has never used smokeless tobacco. She reports current drug use. Drug: Marijuana (Weed). She reports that she does not drink alcohol.     Family History:   Family History   Problem Relation Age of Onset    No Known Problems Mother     Stroke Father     No Known Problems Sister     No Known Problems Brother

## 2024-08-15 NOTE — CARE COORDINATION
Social Work-[facility]. [facility]do not have auth. Discussed with mom. Discussed plan if [facility] does not approve and [facility] denies the Medicaid. She would liek a referral to [facility] or [facility]. Roberto

## 2024-08-15 NOTE — PLAN OF CARE
Problem: Discharge Planning  Goal: Discharge to home or other facility with appropriate resources  Outcome: Progressing     Problem: Skin/Tissue Integrity  Goal: Absence of new skin breakdown  Description: 1.  Monitor for areas of redness and/or skin breakdown  2.  Assess vascular access sites hourly  3.  Every 4-6 hours minimum:  Change oxygen saturation probe site  4.  Every 4-6 hours:  If on nasal continuous positive airway pressure, respiratory therapy assess nares and determine need for appliance change or resting period.  Outcome: Progressing     Problem: Skin/Tissue Integrity - Adult  Goal: Skin integrity remains intact  Outcome: Progressing  Goal: Incisions, wounds, or drain sites healing without S/S of infection  Outcome: Progressing     Problem: Infection - Adult  Goal: Absence of infection at discharge  Outcome: Progressing  Goal: Absence of infection during hospitalization  Outcome: Progressing  Goal: Absence of fever/infection during anticipated neutropenic period  Outcome: Progressing     Problem: Metabolic/Fluid and Electrolytes - Adult  Goal: Electrolytes maintained within normal limits  Outcome: Progressing     Problem: Safety - Adult  Goal: Free from fall injury  Outcome: Progressing     Problem: Musculoskeletal - Adult  Goal: Return mobility to safest level of function  Outcome: Progressing     Problem: Genitourinary - Adult  Goal: Absence of urinary retention  Outcome: Progressing     Problem: Neurosensory - Adult  Goal: Achieves stable or improved neurological status  Outcome: Progressing     Problem: Nutrition Deficit:  Goal: Optimize nutritional status  Outcome: Progressing

## 2024-08-15 NOTE — PROGRESS NOTES
Patient transported to [facility]. Report given over the phone and nurse taking patient was notified of positive urine culture and that [facility] is waiting for sensitivity to come back before prescribing antibiotics.

## 2024-08-16 LAB
MICROORGANISM SPEC CULT: ABNORMAL
SERVICE CMNT-IMP: ABNORMAL
SPECIMEN DESCRIPTION: ABNORMAL

## 2024-08-16 RX ORDER — LEVOFLOXACIN 500 MG/1
500 TABLET, FILM COATED ORAL DAILY
DISCHARGE
Start: 2024-08-16 | End: 2024-08-21

## 2024-08-16 NOTE — PROGRESS NOTES
Urine culture for patient came back. Patient was discharged to [facility] yesterday. Dr. Haywood called and states he is starting the patient on Levaquin for 5 days. Attempted to contact [facility] and was on hold for 9 minutes with no answer. Was told to try again at a later time. New AVS faxed over to [phone]. Will attempt to call back at a later time.       Spoke to Yee at [facility]. She verified the fax number was correct and that she will talk to her nurse practitioner and get this medication ordered.

## 2024-09-30 NOTE — PROGRESS NOTES
Spoke with patient   To verify spironolactone dose  Patient has been taking  edgar 50 mg tab 1/2 tab bid  Never decreased to 25 mg daily    112/51  123/58  108/57  128/38  104/52   disease.  No areas of active  demyelination. Review of systems done by staff reviewed and pertinent positives include Weakness, painful sensations, spasms, back pain, joint pains, fatigue, etc. as stated above.     Current Outpatient Medications on File Prior to Visit   Medication Sig Dispense Refill    diazePAM (VALIUM) 2 MG tablet       ondansetron (ZOFRAN) 4 MG tablet       potassium chloride (KLOR-CON M) 20 MEQ extended release tablet       ENULOSE 10 GM/15ML SOLN solution       ipratropium-albuterol (DUONEB) 0.5-2.5 (3) MG/3ML SOLN nebulizer solution       fluticasone (FLONASE) 50 MCG/ACT nasal spray       neomycin-polymyxin-hydrocortisone (CORTISPORIN) 3.5-38760-9 otic solution       sennosides-docusate sodium (SENOKOT-S) 8.6-50 MG tablet Take 1 tablet by mouth 2 times daily      ibuprofen (ADVIL;MOTRIN) 400 MG tablet Take 400 mg by mouth every 6 hours as needed for Pain      acetaminophen (ACETAMINOPHEN 8 HOUR) 650 MG extended release tablet Take 650 mg by mouth every 8 hours as needed for Pain      bisacodyl (DULCOLAX) 10 MG suppository Place 10 mg rectally daily      Magnesium Hydroxide (MILK OF MAGNESIA PO) Take 30 mLs by mouth as needed      ondansetron (ZOFRAN-ODT) 4 MG disintegrating tablet Take 1 tablet by mouth every 6 hours as needed for Nausea (Patient taking differently: Take 4 mg by mouth every 8 hours as needed for Nausea )      levothyroxine (SYNTHROID) 50 MCG tablet Take 1 tablet by mouth Daily 30 tablet 3    Cholecalciferol (VITAMIN D3) 25 MCG (1000 UT) TABS Take 1 tablet by mouth daily Begin after patient completes her 50,000 units weekly x7 additional weeks (Patient taking differently: Take 50,000 Units by mouth daily Give 1 tablet by mouth one time a day every Wednesday for prophylactic for 7 weeks.) 90 tablet 1    Multiple Vitamins-Minerals (MULTIVITAMIN PO) Take by mouth daily      ferrous sulfate 325 (65 Fe) MG tablet Take 325 mg by mouth daily (with breakfast) limited Rt UE   REFLEX FUNCTION:  Hyperactive DTRs with bilateral ankle clonus and bilateral extensor plantars    STATION and GAIT  wheelchair bound; deferred        MRI brain (w/wo) 11/5/2019: Extensive high signal in brain parenchyma above and below the tentorium. No abnormal enhancement are restricted diffusion signal to suggest recent plaque activity. No comparison exams to assess for stability or interval increase of patient's underlying demyelinating process.         MRI Cervical Spine (11/6/19):  Extensive white matter lesions noted in the brainstem and cervical spinal  cord, compatible with demyelinating disease.  No areas of active  demyelination.       Hepatitis B surface antigen 2/19/2020: Nonreactive. Hepatitis B surface antibody 2/19/2020: Reactive  Hep Be antibody 2/19/2020: Negative. OUMOU virus antibody titer 2/24/20:   JCV antibody: Positive.,  Index value: 3.74     Varicella zoster Ab, Ig G 2/19/20: Immune        Impression and Plan: Ms. Moni Vargas is a 26 y.o. 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 Thyroid disease; likely hypopituitarism   Spasticity and dysesthesias     Will start her on Baclofen and Gabapentin for spasticity and dysthesias  Continue vitamin D supplements and thyroid supplements. Continue PT/ OT.      Discussion done about IV ocrevus option for primary progressive multiple sclerosis.   Discussion/counseling done about ocrelizumab: Risks and benefits including possible side effects such as infusion related reactions, URI, peripheral edema, diarrhea, etc. She voiced

## 2024-10-01 ENCOUNTER — TELEPHONE (OUTPATIENT)
Dept: NEUROLOGY | Age: 34
End: 2024-10-01

## 2024-10-01 NOTE — TELEPHONE ENCOUNTER
I received a call from Isabelle at [facility]. Her number is [phone].  Ghazal is residing there at this time following discharge from [facility] 8/15/24.  They scheduled a follow up for her to see  on Jan. 13, 2025.  She and her mother stated she had last received Ocrevus infusion in Feb. but I do not have any record of it being done at a [facility] facility and the last time I tried to reach the pt. she did not return calls nor did she return our schedulers calls.  Please advise if you want pt. to proceed with Ocrevus infusions or if you want to see her in the office first. The last documented infusion I see is November of 2022.

## 2024-10-09 NOTE — TELEPHONE ENCOUNTER
I called and Isabelle NOWAK and she was not available. Claribel said she will give Isabelle POZO the message. I also asked her to let Ghazal know that I did get her call and that we are working on her seeing Dr. Salmeron sooner than January. We have openings at our  [place] location.

## 2024-10-11 NOTE — TELEPHONE ENCOUNTER
Isabelle FARRELL with facility called. I was able to  schedule her for 10/16/24 with Dr. Salmeron at our office on [address].

## 2024-10-15 NOTE — PROGRESS NOTES
[facility] NEUROLOGY SPECIALIST  [address]  [place] [address]  Dept: [phone]    PATIENT NAME: Ghazal Mcgowan  PATIENT MRN: [id number]  PRIMARY CARE PHYSICIAN: Shanika Boss, SAMARIA - CNP    History     Ghazal Mcgowan is a 34 y.o. female who I initially saw in consultation on 9/27/2023 for PPMS.  Her history is summarized as follows:      Ms. Mcgowan was a limited historian and presented today with her mother, who is her primary caregiver and provided additional history.  Ms. Mcgowan was in graduate school getting a masters degree in her early 20s, when she began having progressive difficulty walking and weakness on the right side.  This was ongoing without a clear diagnosis for about 1 year duration before her diagnosis.  She ultimately woke up one morning and was not able to walk.  At that point, she was admitted to [facility].  She underwent an extensive work-up and was ultimately diagnosed with PPMS.       Ms. Mcgowan was treated with several disease modifying therapies including glatiramer acetate and fingolimod.  She was switched to ocrelizumab when this became available.  She does have a family history of breast cancer and a history of HSV.  She was seen by ID prior to starting ocrelizumab.  She received infusions in November, 2022, but did not get her May infusion.      Ms Mcgowan presented to the emergency room and was admitted on 8/16/2023 for MS relapse.  It appears that she had left a skilled nursing facility and had not been cared for.  She had a slow decline.    She had been very weak.  She was admitted to a rehabilitation and she improved over about 2 weeks. She is back at home with her mother and has returned to baseline.  She has significant paraparesis, RUE weakness, and truncal weakness. She uses a motorized wheelchair at home.    TODAY'S EVALUATION     Ghazal Mcgowan was last seen by me on 9/27/2023 for PPMS.  She has been lost to follow-up since that time. She

## 2024-10-16 ENCOUNTER — OFFICE VISIT (OUTPATIENT)
Dept: NEUROLOGY | Age: 34
End: 2024-10-16
Payer: COMMERCIAL

## 2024-10-16 ENCOUNTER — TELEPHONE (OUTPATIENT)
Dept: NEUROLOGY | Age: 34
End: 2024-10-16

## 2024-10-16 VITALS
HEIGHT: 70 IN | DIASTOLIC BLOOD PRESSURE: 66 MMHG | BODY MASS INDEX: 13.06 KG/M2 | SYSTOLIC BLOOD PRESSURE: 94 MMHG | HEART RATE: 65 BPM

## 2024-10-16 DIAGNOSIS — G35 PRIMARY CHRONIC PROGRESSIVE MULTIPLE SCLEROSIS (HCC): Primary | ICD-10-CM

## 2024-10-16 DIAGNOSIS — Z91.89 AT RISK FOR SIDE EFFECT OF MEDICATION: ICD-10-CM

## 2024-10-16 DIAGNOSIS — G37.3 TRANSVERSE MYELITIS (HCC): ICD-10-CM

## 2024-10-16 DIAGNOSIS — G35 MULTIPLE SCLEROSIS, PRIMARY CHRONIC PROGRESSIVE (HCC): ICD-10-CM

## 2024-10-16 DIAGNOSIS — G35 MULTIPLE SCLEROSIS (HCC): Primary | ICD-10-CM

## 2024-10-16 DIAGNOSIS — Z11.59 NEED FOR HEPATITIS B SCREENING TEST: ICD-10-CM

## 2024-10-16 DIAGNOSIS — R47.1 DYSARTHRIA: ICD-10-CM

## 2024-10-16 DIAGNOSIS — R25.2 SPASTICITY: ICD-10-CM

## 2024-10-16 PROCEDURE — 99214 OFFICE O/P EST MOD 30 MIN: CPT | Performed by: PSYCHIATRY & NEUROLOGY

## 2024-10-16 RX ORDER — FAMOTIDINE 10 MG/ML
20 INJECTION, SOLUTION INTRAVENOUS
OUTPATIENT
Start: 2024-10-16

## 2024-10-16 RX ORDER — EPINEPHRINE 1 MG/ML
0.3 INJECTION, SOLUTION, CONCENTRATE INTRAVENOUS PRN
Status: CANCELLED | OUTPATIENT
Start: 2024-10-16

## 2024-10-16 RX ORDER — ACETAMINOPHEN 325 MG/1
650 TABLET ORAL ONCE
OUTPATIENT
Start: 2024-10-16 | End: 2024-10-16

## 2024-10-16 RX ORDER — DIPHENHYDRAMINE HYDROCHLORIDE 50 MG/ML
25 INJECTION INTRAMUSCULAR; INTRAVENOUS ONCE
OUTPATIENT
Start: 2024-10-16 | End: 2024-10-16

## 2024-10-16 RX ORDER — SODIUM CHLORIDE 9 MG/ML
5-250 INJECTION, SOLUTION INTRAVENOUS PRN
OUTPATIENT
Start: 2024-10-16

## 2024-10-16 RX ORDER — ALBUTEROL SULFATE 90 UG/1
4 INHALANT RESPIRATORY (INHALATION) PRN
Status: CANCELLED | OUTPATIENT
Start: 2024-10-16

## 2024-10-16 RX ORDER — DIPHENHYDRAMINE HYDROCHLORIDE 50 MG/ML
25 INJECTION INTRAMUSCULAR; INTRAVENOUS ONCE
Status: CANCELLED | OUTPATIENT
Start: 2024-10-16 | End: 2024-10-16

## 2024-10-16 RX ORDER — SODIUM CHLORIDE 9 MG/ML
5-250 INJECTION, SOLUTION INTRAVENOUS PRN
Status: CANCELLED | OUTPATIENT
Start: 2024-10-16

## 2024-10-16 RX ORDER — SODIUM CHLORIDE 9 MG/ML
INJECTION, SOLUTION INTRAVENOUS CONTINUOUS
Status: CANCELLED | OUTPATIENT
Start: 2024-10-16

## 2024-10-16 RX ORDER — SODIUM CHLORIDE 0.9 % (FLUSH) 0.9 %
5-40 SYRINGE (ML) INJECTION PRN
OUTPATIENT
Start: 2024-10-16

## 2024-10-16 RX ORDER — ACETAMINOPHEN 325 MG/1
650 TABLET ORAL
Status: CANCELLED | OUTPATIENT
Start: 2024-10-16

## 2024-10-16 RX ORDER — DIPHENHYDRAMINE HYDROCHLORIDE 50 MG/ML
50 INJECTION INTRAMUSCULAR; INTRAVENOUS
Status: CANCELLED | OUTPATIENT
Start: 2024-10-16

## 2024-10-16 RX ORDER — EPINEPHRINE 1 MG/ML
0.3 INJECTION, SOLUTION, CONCENTRATE INTRAVENOUS PRN
OUTPATIENT
Start: 2024-10-16

## 2024-10-16 RX ORDER — ALBUTEROL SULFATE 90 UG/1
4 INHALANT RESPIRATORY (INHALATION) PRN
OUTPATIENT
Start: 2024-10-16

## 2024-10-16 RX ORDER — ONDANSETRON 2 MG/ML
8 INJECTION INTRAMUSCULAR; INTRAVENOUS
OUTPATIENT
Start: 2024-10-16

## 2024-10-16 RX ORDER — HEPARIN SODIUM (PORCINE) LOCK FLUSH IV SOLN 100 UNIT/ML 100 UNIT/ML
500 SOLUTION INTRAVENOUS PRN
Status: CANCELLED | OUTPATIENT
Start: 2024-10-16

## 2024-10-16 RX ORDER — SODIUM CHLORIDE 9 MG/ML
INJECTION, SOLUTION INTRAVENOUS CONTINUOUS
OUTPATIENT
Start: 2024-10-16

## 2024-10-16 RX ORDER — HEPARIN SODIUM (PORCINE) LOCK FLUSH IV SOLN 100 UNIT/ML 100 UNIT/ML
500 SOLUTION INTRAVENOUS PRN
OUTPATIENT
Start: 2024-10-16

## 2024-10-16 RX ORDER — ACETAMINOPHEN 325 MG/1
650 TABLET ORAL ONCE
Status: CANCELLED | OUTPATIENT
Start: 2024-10-16 | End: 2024-10-16

## 2024-10-16 RX ORDER — ONDANSETRON 2 MG/ML
8 INJECTION INTRAMUSCULAR; INTRAVENOUS
Status: CANCELLED | OUTPATIENT
Start: 2024-10-16

## 2024-10-16 RX ORDER — ACETAMINOPHEN 325 MG/1
650 TABLET ORAL
OUTPATIENT
Start: 2024-10-16

## 2024-10-16 RX ORDER — DIPHENHYDRAMINE HYDROCHLORIDE 50 MG/ML
50 INJECTION INTRAMUSCULAR; INTRAVENOUS
OUTPATIENT
Start: 2024-10-16

## 2024-10-16 RX ORDER — FAMOTIDINE 10 MG/ML
20 INJECTION, SOLUTION INTRAVENOUS
Status: CANCELLED | OUTPATIENT
Start: 2024-10-16

## 2024-10-16 RX ORDER — SODIUM CHLORIDE 0.9 % (FLUSH) 0.9 %
5-40 SYRINGE (ML) INJECTION PRN
Status: CANCELLED | OUTPATIENT
Start: 2024-10-16

## 2024-10-16 NOTE — PROGRESS NOTES
[facility], [facility] NEUROLOGY SPECIALIST  [address] [place] [address]     Patient:  Ghazal Mcgowan   :  1990   MRN:  [id number]   Provider:  Shanika Boss APRN - CNP      Date of Screening:  10/16/2024     [facility] Screening Tool    Medical Care:                     PRESCRIPTIONS:  Do you have any difficulties with the following:  Affording Medications?   No     Picking Medications Up?  No      IN HOME CARE SERVICES:  Are you seen in your home by any of following:  Home Health?  No  needs aide has been assessed for aide services at home. Several shifts to help with aides   Palliative Care?  No   __________________________________________________________________________________________________    Activities of Daily Living (ADLS):  Do you have difficulty with the any of the following:  Bathing?  Yes -    Toileting?  Yes -    Dressing?  Yes -    Feeding Yourself?  No    Falling?  No   Any Fears of Falling?  No   __________________________________________________________________________________________________    Insurance:  Do you have trouble affording medical care? No   I.e. Office visits, labs, tests that are ordered by your provider?   No   __________________________________________________________________________________________________    Transportation:    Have you ever gone without medical care because you did have a way to get there?  No     Food:  In the past month have you had any difficulties with the following:    Getting groceries?  No   Preparing meals?  No   Concern that you would run out of food?   No     Housing:    Are you worried about not having stable housing in the next 2 months?  No   Is taking care of your home overwhelming? (Mowing, Leaves, Snow removal, Plumbing, etc) No     Utilities:  Have you lost service or concerned about affording the following:  Electric?  No   Gas?  No   Water?  No          Safety and Isolation:    Do you

## 2024-10-21 ENCOUNTER — TELEPHONE (OUTPATIENT)
Dept: NEUROLOGY | Age: 34
End: 2024-10-21

## 2024-10-21 NOTE — TELEPHONE ENCOUNTER
Ayala Barrera is Ghazal's mom. Ghazal called in today and authorized that we are able to speak with her mother on her behalf.     If her mother calls in it is okay to speak with her.

## 2024-11-01 ENCOUNTER — TELEPHONE (OUTPATIENT)
Dept: NEUROLOGY | Age: 34
End: 2024-11-01

## 2024-11-01 NOTE — TELEPHONE ENCOUNTER
I received a message from Sharon Raines (Althea Duran) that Ghazal's insurance did not approve infusions at hospital based site. She already has a prior authorization approval in place for [facility]. Is it okay to proceed at a non hospital based center or do you want me to file an appeal with [employer]?

## 2024-11-04 NOTE — TELEPHONE ENCOUNTER
I called and lm for SonjaGhazal's mom that we were tring to schedule it through [facility]. I called [facility]  and spoke with Katy. She advised she is not in their system. She stated we can have Dr. langford sign and order for Ocrevus and fax it to them at [phone].  They will do an insurance benefit investigation and PA.  She will let us know what they find out.

## 2024-11-11 ENCOUNTER — TELEPHONE (OUTPATIENT)
Dept: NEUROLOGY | Age: 34
End: 2024-11-11

## 2024-11-11 NOTE — TELEPHONE ENCOUNTER
Faxed [facility] order form with office note and note on cover sheet to contact [facility] to schedule her infusion.

## 2024-11-11 NOTE — TELEPHONE ENCOUNTER
Ghazal has an allergy to Benadryl.  Facial swelling is listed. [facility] order sheet has r Allegra 180 mg. as an option or another non sedating antihistamine?

## 2024-11-11 NOTE — TELEPHONE ENCOUNTER
Order form faxed to [facility] with a note that they should contact [facility] and ask for Claribel in transporation or her nurse.  [phone]. I called and lm for her mother also.

## 2024-11-11 NOTE — TELEPHONE ENCOUNTER
Dr. Salmeron did order labs at her visit on 10/16/24.   I called [facility] and spoke with Cydney to see if they had drawn labs on Ghazal after her appt. on 10/16. She was going to fax me the results.

## 2024-11-26 ENCOUNTER — TELEPHONE (OUTPATIENT)
Dept: NEUROLOGY | Age: 34
End: 2024-11-26

## 2024-11-26 DIAGNOSIS — Z11.59 NEED FOR HEPATITIS B SCREENING TEST: ICD-10-CM

## 2024-11-26 DIAGNOSIS — G35 MULTIPLE SCLEROSIS (HCC): ICD-10-CM

## 2024-11-26 DIAGNOSIS — Z91.89 AT RISK FOR SIDE EFFECT OF MEDICATION: ICD-10-CM

## 2024-11-26 NOTE — TELEPHONE ENCOUNTER
I did notify Meena SNarcisa with [facility] to let her know that Dr. Salmeron reviewed and said she can proceed with the infusion. She has a follow up appointment on 12/2 with Dr. Salmeron

## 2024-11-26 NOTE — TELEPHONE ENCOUNTER
We received some lab results from [facility] but after several calls to the facility and to laboratory they did not draw the IgG, IgM, TB Gold or [facility] demyelinating panel. Does she need those done prior to Ocrevus infusion?

## 2024-12-02 ENCOUNTER — TELEPHONE (OUTPATIENT)
Dept: NEUROLOGY | Age: 34
End: 2024-12-02

## 2024-12-02 ENCOUNTER — OFFICE VISIT (OUTPATIENT)
Dept: NEUROLOGY | Age: 34
End: 2024-12-02
Payer: COMMERCIAL

## 2024-12-02 VITALS
BODY MASS INDEX: 13.77 KG/M2 | DIASTOLIC BLOOD PRESSURE: 59 MMHG | HEART RATE: 83 BPM | SYSTOLIC BLOOD PRESSURE: 87 MMHG | WEIGHT: 96 LBS

## 2024-12-02 DIAGNOSIS — R47.1 DYSARTHRIA: Primary | ICD-10-CM

## 2024-12-02 DIAGNOSIS — R13.10 DYSPHAGIA, UNSPECIFIED TYPE: ICD-10-CM

## 2024-12-02 DIAGNOSIS — R25.2 SPASTICITY: ICD-10-CM

## 2024-12-02 DIAGNOSIS — Z91.89 AT RISK FOR SIDE EFFECT OF MEDICATION: ICD-10-CM

## 2024-12-02 DIAGNOSIS — G35 MULTIPLE SCLEROSIS (HCC): ICD-10-CM

## 2024-12-02 PROCEDURE — 99214 OFFICE O/P EST MOD 30 MIN: CPT | Performed by: PSYCHIATRY & NEUROLOGY

## 2024-12-02 RX ORDER — CETIRIZINE HYDROCHLORIDE 10 MG/1
10 TABLET ORAL SEE ADMIN INSTRUCTIONS
Qty: 30 TABLET | Refills: 0 | Status: SHIPPED | OUTPATIENT
Start: 2024-12-02

## 2024-12-02 RX ORDER — POLYETHYLENE GLYCOL 3350 17 G/17G
17 POWDER, FOR SOLUTION ORAL DAILY
COMMUNITY

## 2024-12-02 NOTE — TELEPHONE ENCOUNTER
I did call Meena at [facility] and  for her wondering if they were ready to schedule her or if they need anything further from our office.

## 2024-12-02 NOTE — PROGRESS NOTES
SW followed up with parent who reports they are still living at [facility]. SW will call parent to follow up with concerns from last visit.

## 2024-12-02 NOTE — TELEPHONE ENCOUNTER
Patient's mother Sonja Barrera called the office stating that Ghazal has an appointment with Dr. Salmeron this morning and she was unable to get off work.  Mother wanted to let Dr. Salmeron know that Ghazal continues to have issues with speech.  Mother states that Ghazal's speech will become \"extremely slurred\" 5 - 6 words into her sentence, and then progressively becomes difficult to understand from the slurring as well as loss of volume.  Mother states that this has been going on for the last 4-5 months.  Writer advised that I would forward this to Dr. Salmeron.  Mother stated that she could be reached at [phone] if Dr. Salmeron would like to speak with her.

## 2024-12-02 NOTE — TELEPHONE ENCOUNTER
Meena informed me that they do have oral ceterizine and if we fax the order they will administer it one hour prior to the infusion.     She also advised that they will get confirmation of the two step TB skin test and then they will draw a quant IG at the time of her first dose.  They are working with [facility] her [facility] as they are trying to clarify if she has POA but will get her scheduled as soon as possible.

## 2024-12-02 NOTE — TELEPHONE ENCOUNTER
Dr. Salmeron saw Ghazal in the office today and since she has an allergy to Benadryl [facility]. If they do not then  advised that Ghazal will need to arrive at [facility] one hour prior to the Ocrevus infusion. She needs to take the Certrizine there in front of the nurse 1 hr  prior to her infusion starting. She did write a RX for the Certrizine today.    I called [facility] and spoke with Keysha. He advised that they did do a two step TB skin test on her admission and it was negative.

## 2024-12-05 NOTE — TELEPHONE ENCOUNTER
I spoke with Meena FARRELL with [facility] this morning. She siad that they have been working with [facility] and they are going to get a  for Ghazal as her Two step TB test was indeterminate.  Then they will be getting her scheduled.

## 2025-01-09 DIAGNOSIS — R25.2 SPASTICITY: ICD-10-CM

## 2025-01-09 DIAGNOSIS — G35 MULTIPLE SCLEROSIS (HCC): ICD-10-CM

## 2025-01-10 ENCOUNTER — TELEPHONE (OUTPATIENT)
Dept: NEUROLOGY | Age: 35
End: 2025-01-10

## 2025-01-27 ENCOUNTER — TELEPHONE (OUTPATIENT)
Dept: NEUROLOGY | Age: 35
End: 2025-01-27

## 2025-01-27 NOTE — TELEPHONE ENCOUNTER
01 24 2025 CBC and CMP from [facility] attached.  I was unable to release orders to link to them since they were part of a care plan.  KS

## 2025-02-05 ENCOUNTER — TELEPHONE (OUTPATIENT)
Dept: NEUROLOGY | Age: 35
End: 2025-02-05

## 2025-02-05 NOTE — TELEPHONE ENCOUNTER
Received request from [facility] to send in last office note so they can update patients prior authorization. Office note from 12/2/2025 faxed to [phone].

## 2025-03-07 ENCOUNTER — TELEPHONE (OUTPATIENT)
Dept: NEUROLOGY | Age: 35
End: 2025-03-07

## 2025-03-07 NOTE — TELEPHONE ENCOUNTER
Received a call from Alvaro, patient's  with [facility]. She states that she does not have a PCP at this time, and she is wondering we are able to order incontinence supplies for this patient. Please advise.

## 2025-04-09 ENCOUNTER — TELEPHONE (OUTPATIENT)
Dept: NEUROLOGY | Age: 35
End: 2025-04-09

## 2025-04-09 NOTE — TELEPHONE ENCOUNTER
Need form signed by Dr Salmeron  - [facility] drop off form to be signed by Dr Salmeron for Wheel Chair . When Done Please fax it to [phone]

## 2025-05-07 ENCOUNTER — OFFICE VISIT (OUTPATIENT)
Dept: NEUROLOGY | Age: 35
End: 2025-05-07
Payer: COMMERCIAL

## 2025-05-07 VITALS
SYSTOLIC BLOOD PRESSURE: 106 MMHG | HEIGHT: 69 IN | DIASTOLIC BLOOD PRESSURE: 70 MMHG | HEART RATE: 102 BPM | BODY MASS INDEX: 14.18 KG/M2

## 2025-05-07 DIAGNOSIS — R25.2 SPASTICITY: ICD-10-CM

## 2025-05-07 DIAGNOSIS — G82.20 PARAPARESIS (HCC): ICD-10-CM

## 2025-05-07 DIAGNOSIS — M62.81 MUSCLE WEAKNESS OF RIGHT UPPER EXTREMITY: ICD-10-CM

## 2025-05-07 DIAGNOSIS — R47.1 DYSARTHRIA: ICD-10-CM

## 2025-05-07 DIAGNOSIS — G35 MULTIPLE SCLEROSIS (HCC): Primary | ICD-10-CM

## 2025-05-07 PROCEDURE — 99214 OFFICE O/P EST MOD 30 MIN: CPT | Performed by: PSYCHIATRY & NEUROLOGY

## 2025-05-07 NOTE — PROGRESS NOTES
[facility] NEUROLOGY SPECIALIST  [address]  [place] [address]  Dept: [phone]    PATIENT NAME: Ghazal Mcgowan  PATIENT MRN: [id number]  PRIMARY CARE PHYSICIAN: Shanika Boss, APRN - CNP    History     Ghazal Mcgowan is a 35 y.o. female who I initially saw in consultation on 9/27/2023 for PPMS.  Her history is summarized as follows:       Ms. Mcgowan was a limited historian and presented today with her mother, who is her primary caregiver and provided additional history.  Ms. Mcgowan was in graduate school getting a masters degree in her early 20s, when she began having progressive difficulty walking and weakness on the right side.  This was ongoing without a clear diagnosis for about 1 year duration before her diagnosis.  She ultimately woke up one morning and was not able to walk.  At that point, she was admitted to [facility].  She underwent an extensive work-up and was ultimately diagnosed with PPMS.       Ms. Mcgowan was treated with several disease modifying therapies including glatiramer acetate and fingolimod.  She was switched to ocrelizumab when this became available.  She does have a family history of breast cancer and a history of HSV.  She was seen by ID prior to starting ocrelizumab.  She received infusions in November, 2022, but did not get her May infusion.      Ms Mcgowan presented to the emergency room and was admitted on 8/16/2023 for MS relapse.  It appears that she had left a skilled nursing facility and had not been cared for.  She had a slow decline.    She had been very weak.  She was admitted to a rehabilitation and she improved over about 2 weeks. She is back at home with her mother and has returned to baseline.  She has significant paraparesis, RUE weakness, and truncal weakness. She uses a motorized wheelchair at home.       TODAY'S EVALUATION     Ghazal Mcgowan was last seen by me on 12/2/2024.  Since her last visit with me, she has had an improvement in

## 2025-05-22 ENCOUNTER — TELEPHONE (OUTPATIENT)
Dept: NEUROLOGY | Age: 35
End: 2025-05-22

## 2025-05-22 NOTE — TELEPHONE ENCOUNTER
Erlinda from [facility] ([phone]) called the office asking if Dr. Salmeron would be willing to approve and follow the care for the patient's home health.  Erlinda stated that the mother was supposed to find a PCP for the patient, however has never provided one so they can't continue care for the patient.  Erlinda stated that this would be for 40 hours per week to assist with personal care and household duties as well as an RN visit every 60 days.  Writer advised that I would forward this to Dr. Salmeron.  Erlinda asked that we fax an order to [phone] if Dr. Salmeron was willing to do this.

## 2025-08-22 ENCOUNTER — TELEPHONE (OUTPATIENT)
Dept: NEUROLOGY | Age: 35
End: 2025-08-22